# Patient Record
Sex: FEMALE | Race: WHITE | NOT HISPANIC OR LATINO | URBAN - METROPOLITAN AREA
[De-identification: names, ages, dates, MRNs, and addresses within clinical notes are randomized per-mention and may not be internally consistent; named-entity substitution may affect disease eponyms.]

---

## 2018-02-15 ENCOUNTER — OUTPATIENT (OUTPATIENT)
Dept: OUTPATIENT SERVICES | Facility: HOSPITAL | Age: 78
LOS: 1 days | End: 2018-02-15
Payer: MEDICARE

## 2018-02-15 ENCOUNTER — APPOINTMENT (OUTPATIENT)
Dept: MAMMOGRAPHY | Facility: HOSPITAL | Age: 78
End: 2018-02-15
Payer: MEDICARE

## 2018-02-15 DIAGNOSIS — Z12.31 ENCOUNTER FOR SCREENING MAMMOGRAM FOR MALIGNANT NEOPLASM OF BREAST: ICD-10-CM

## 2018-02-15 PROCEDURE — 77067 SCR MAMMO BI INCL CAD: CPT | Mod: 26

## 2018-02-15 PROCEDURE — 77063 BREAST TOMOSYNTHESIS BI: CPT | Mod: 26

## 2018-02-15 PROCEDURE — 77067 SCR MAMMO BI INCL CAD: CPT

## 2018-02-15 PROCEDURE — 77063 BREAST TOMOSYNTHESIS BI: CPT

## 2019-04-05 ENCOUNTER — MEDICATION RENEWAL (OUTPATIENT)
Age: 79
End: 2019-04-05

## 2019-04-09 ENCOUNTER — MEDICATION RENEWAL (OUTPATIENT)
Age: 79
End: 2019-04-09

## 2019-05-13 ENCOUNTER — APPOINTMENT (OUTPATIENT)
Dept: CARDIOLOGY | Facility: CLINIC | Age: 79
End: 2019-05-13
Payer: MEDICARE

## 2019-05-13 ENCOUNTER — NON-APPOINTMENT (OUTPATIENT)
Age: 79
End: 2019-05-13

## 2019-05-13 VITALS
DIASTOLIC BLOOD PRESSURE: 86 MMHG | HEIGHT: 61 IN | WEIGHT: 160 LBS | SYSTOLIC BLOOD PRESSURE: 150 MMHG | OXYGEN SATURATION: 98 % | HEART RATE: 86 BPM | TEMPERATURE: 98.7 F | BODY MASS INDEX: 30.21 KG/M2

## 2019-05-13 DIAGNOSIS — Z80.41 FAMILY HISTORY OF MALIGNANT NEOPLASM OF OVARY: ICD-10-CM

## 2019-05-13 DIAGNOSIS — Z80.1 FAMILY HISTORY OF MALIGNANT NEOPLASM OF TRACHEA, BRONCHUS AND LUNG: ICD-10-CM

## 2019-05-13 DIAGNOSIS — Z78.9 OTHER SPECIFIED HEALTH STATUS: ICD-10-CM

## 2019-05-13 PROCEDURE — 99214 OFFICE O/P EST MOD 30 MIN: CPT

## 2019-05-13 PROCEDURE — 93000 ELECTROCARDIOGRAM COMPLETE: CPT

## 2019-05-13 NOTE — PHYSICAL EXAM
[No Acute Distress] : no acute distress [Well Nourished] : well nourished [Well Developed] : well developed [Well-Appearing] : well-appearing [Normal Sclera/Conjunctiva] : normal sclera/conjunctiva [PERRL] : pupils equal round and reactive to light [EOMI] : extraocular movements intact [Normal Outer Ear/Nose] : the outer ears and nose were normal in appearance [Normal Oropharynx] : the oropharynx was normal [No JVD] : no jugular venous distention [Thyroid Normal, No Nodules] : the thyroid was normal and there were no nodules present [No Lymphadenopathy] : no lymphadenopathy [Supple] : supple [No Respiratory Distress] : no respiratory distress  [Clear to Auscultation] : lungs were clear to auscultation bilaterally [No Accessory Muscle Use] : no accessory muscle use [Normal Rate] : normal rate  [Regular Rhythm] : with a regular rhythm [Normal S1, S2] : normal S1 and S2 [No Abdominal Bruit] : a ~M bruit was not heard ~T in the abdomen [No Murmur] : no murmur heard [No Carotid Bruits] : no carotid bruits [Pedal Pulses Present] : the pedal pulses are present [No Varicosities] : no varicosities [No Edema] : there was no peripheral edema [No Palpable Aorta] : no palpable aorta [No Extremity Clubbing/Cyanosis] : no extremity clubbing/cyanosis [Non Tender] : non-tender [Soft] : abdomen soft [No HSM] : no HSM [Non-distended] : non-distended [No Masses] : no abdominal mass palpated [Normal Posterior Cervical Nodes] : no posterior cervical lymphadenopathy [Normal Bowel Sounds] : normal bowel sounds [Normal Anterior Cervical Nodes] : no anterior cervical lymphadenopathy [No CVA Tenderness] : no CVA  tenderness [No Joint Swelling] : no joint swelling [Grossly Normal Strength/Tone] : grossly normal strength/tone [No Spinal Tenderness] : no spinal tenderness [No Rash] : no rash [Normal Gait] : normal gait [No Focal Deficits] : no focal deficits [Coordination Grossly Intact] : coordination grossly intact [Normal Affect] : the affect was normal [Deep Tendon Reflexes (DTR)] : deep tendon reflexes were 2+ and symmetric [Normal Insight/Judgement] : insight and judgment were intact

## 2019-05-20 LAB
BASOPHILS # BLD AUTO: 0.04 K/UL
BASOPHILS NFR BLD AUTO: 0.6 %
EOSINOPHIL # BLD AUTO: 0.16 K/UL
EOSINOPHIL NFR BLD AUTO: 2.3 %
HCT VFR BLD CALC: 42.2 %
HGB BLD-MCNC: 13.3 G/DL
IMM GRANULOCYTES NFR BLD AUTO: 0.1 %
LYMPHOCYTES # BLD AUTO: 1.67 K/UL
LYMPHOCYTES NFR BLD AUTO: 24.1 %
MAN DIFF?: NORMAL
MCHC RBC-ENTMCNC: 28.9 PG
MCHC RBC-ENTMCNC: 31.5 GM/DL
MCV RBC AUTO: 91.7 FL
MONOCYTES # BLD AUTO: 0.51 K/UL
MONOCYTES NFR BLD AUTO: 7.4 %
NEUTROPHILS # BLD AUTO: 4.54 K/UL
NEUTROPHILS NFR BLD AUTO: 65.5 %
PLATELET # BLD AUTO: 169 K/UL
RBC # BLD: 4.6 M/UL
RBC # FLD: 14.3 %
WBC # FLD AUTO: 6.93 K/UL

## 2019-05-21 LAB
25(OH)D3 SERPL-MCNC: 32.4 NG/ML
ALBUMIN SERPL ELPH-MCNC: 4.6 G/DL
ALP BLD-CCNC: 63 U/L
ALT SERPL-CCNC: 14 U/L
ANION GAP SERPL CALC-SCNC: 13 MMOL/L
AST SERPL-CCNC: 19 U/L
BILIRUB DIRECT SERPL-MCNC: 0.1 MG/DL
BILIRUB INDIRECT SERPL-MCNC: 0.2 MG/DL
BILIRUB SERPL-MCNC: 0.4 MG/DL
BUN SERPL-MCNC: 16 MG/DL
CALCIUM SERPL-MCNC: 9.4 MG/DL
CHLORIDE SERPL-SCNC: 105 MMOL/L
CHOLEST SERPL-MCNC: 150 MG/DL
CHOLEST/HDLC SERPL: 2.9 RATIO
CK SERPL-CCNC: 82 U/L
CO2 SERPL-SCNC: 24 MMOL/L
CREAT SERPL-MCNC: 0.74 MG/DL
ESTIMATED AVERAGE GLUCOSE: 97 MG/DL
GLUCOSE SERPL-MCNC: 97 MG/DL
HBA1C MFR BLD HPLC: 5 %
HDLC SERPL-MCNC: 51 MG/DL
LDLC SERPL CALC-MCNC: 80 MG/DL
POTASSIUM SERPL-SCNC: 4.1 MMOL/L
PROT SERPL-MCNC: 7.3 G/DL
SODIUM SERPL-SCNC: 142 MMOL/L
T4 FREE SERPL-MCNC: 1.7 NG/DL
TRIGL SERPL-MCNC: 96 MG/DL
TSH SERPL-ACNC: 0.81 UIU/ML

## 2019-05-22 ENCOUNTER — APPOINTMENT (OUTPATIENT)
Dept: RADIOLOGY | Facility: HOSPITAL | Age: 79
End: 2019-05-22
Payer: MEDICARE

## 2019-05-22 ENCOUNTER — OUTPATIENT (OUTPATIENT)
Dept: OUTPATIENT SERVICES | Facility: HOSPITAL | Age: 79
LOS: 1 days | End: 2019-05-22
Payer: MEDICARE

## 2019-05-22 ENCOUNTER — APPOINTMENT (OUTPATIENT)
Dept: MAMMOGRAPHY | Facility: HOSPITAL | Age: 79
End: 2019-05-22
Payer: MEDICARE

## 2019-05-22 DIAGNOSIS — Z12.31 ENCOUNTER FOR SCREENING MAMMOGRAM FOR MALIGNANT NEOPLASM OF BREAST: ICD-10-CM

## 2019-05-22 PROCEDURE — 77067 SCR MAMMO BI INCL CAD: CPT

## 2019-05-22 PROCEDURE — 77063 BREAST TOMOSYNTHESIS BI: CPT | Mod: 26

## 2019-05-22 PROCEDURE — 77067 SCR MAMMO BI INCL CAD: CPT | Mod: 26

## 2019-05-22 PROCEDURE — 71100 X-RAY EXAM RIBS UNI 2 VIEWS: CPT

## 2019-05-22 PROCEDURE — 71101 X-RAY EXAM UNILAT RIBS/CHEST: CPT | Mod: 26,RT

## 2019-05-22 PROCEDURE — 77063 BREAST TOMOSYNTHESIS BI: CPT

## 2019-05-22 PROCEDURE — 71101 X-RAY EXAM UNILAT RIBS/CHEST: CPT

## 2019-05-24 ENCOUNTER — APPOINTMENT (OUTPATIENT)
Dept: RADIOLOGY | Facility: HOSPITAL | Age: 79
End: 2019-05-24
Payer: MEDICARE

## 2019-05-24 ENCOUNTER — OUTPATIENT (OUTPATIENT)
Dept: OUTPATIENT SERVICES | Facility: HOSPITAL | Age: 79
LOS: 1 days | End: 2019-05-24
Payer: MEDICARE

## 2019-05-24 DIAGNOSIS — Z00.8 ENCOUNTER FOR OTHER GENERAL EXAMINATION: ICD-10-CM

## 2019-05-24 PROCEDURE — 71046 X-RAY EXAM CHEST 2 VIEWS: CPT | Mod: 26

## 2019-05-24 PROCEDURE — 71046 X-RAY EXAM CHEST 2 VIEWS: CPT

## 2019-05-27 NOTE — REVIEW OF SYSTEMS
[Negative] : Heme/Lymph [Joint Pain] : no joint pain [Joint Stiffness] : no joint stiffness [Muscle Weakness] : no muscle weakness [Muscle Pain] : no muscle pain [Joint Swelling] : no joint swelling [FreeTextEntry9] : Right Rib Pain [Back Pain] : no back pain

## 2019-05-27 NOTE — HISTORY OF PRESENT ILLNESS
[FreeTextEntry8] : This is a 78 year old lady with a PMH of Hypothyroid, HLD, and HTN. She states that in early April she was shoveling the snow and the ice and is concerned that she pulled something. The pain comes and goes and she rates the pain  4/10. Patient denies dyspnea, palpitations, chest pain, nausea, vomiting, dizziness and lightheadedness.\par

## 2019-06-26 ENCOUNTER — APPOINTMENT (OUTPATIENT)
Dept: ULTRASOUND IMAGING | Facility: HOSPITAL | Age: 79
End: 2019-06-26
Payer: MEDICARE

## 2019-06-26 ENCOUNTER — OUTPATIENT (OUTPATIENT)
Dept: OUTPATIENT SERVICES | Facility: HOSPITAL | Age: 79
LOS: 1 days | End: 2019-06-26
Payer: MEDICARE

## 2019-06-26 DIAGNOSIS — Z00.8 ENCOUNTER FOR OTHER GENERAL EXAMINATION: ICD-10-CM

## 2019-06-26 PROCEDURE — 76641 ULTRASOUND BREAST COMPLETE: CPT

## 2019-06-26 PROCEDURE — 76641 ULTRASOUND BREAST COMPLETE: CPT | Mod: 26,50

## 2019-07-09 ENCOUNTER — MEDICATION RENEWAL (OUTPATIENT)
Age: 79
End: 2019-07-09

## 2019-10-08 ENCOUNTER — MEDICATION RENEWAL (OUTPATIENT)
Age: 79
End: 2019-10-08

## 2019-12-12 ENCOUNTER — APPOINTMENT (OUTPATIENT)
Dept: ORTHOPEDIC SURGERY | Facility: CLINIC | Age: 79
End: 2019-12-12
Payer: MEDICARE

## 2019-12-12 VITALS — HEIGHT: 64 IN | WEIGHT: 160 LBS | BODY MASS INDEX: 27.31 KG/M2

## 2019-12-12 DIAGNOSIS — M25.562 PAIN IN LEFT KNEE: ICD-10-CM

## 2019-12-12 PROCEDURE — 73564 X-RAY EXAM KNEE 4 OR MORE: CPT | Mod: LT

## 2019-12-12 PROCEDURE — 99203 OFFICE O/P NEW LOW 30 MIN: CPT

## 2019-12-12 NOTE — END OF VISIT
[FreeTextEntry3] : All medical record entries made by the Scribe were at my, Dr. Karlo Locke, direction and personally dictated by me on 12/12/2019. I have reviewed the chart and agree that the record accurately reflects my personal performance of the history, physical exam, assessment and plan. I have also personally directed, reviewed, and agreed with the chart.

## 2019-12-12 NOTE — PHYSICAL EXAM
[Normal Touch] : sensation intact for touch [Normal] : No swelling, no edema, normal pedal pulses and normal temperature [Normal LLE] : Left Lower Extremity: No scars, rashes, lesions, ulcers, skin intact [Poor Appearance] : well-appearing [Acute Distress] : not in acute distress [Obese] : not obese [de-identified] : Left Lower Extremity\par o Knee :\par ¦ Inspection/Palpation : no tenderness to palpation, minimal swelling with no effusion, no deformity\par ¦ Range of Motion : 0 - 120 degrees, no crepitus\par ¦ Stability : no valgus or varus instability present on provocative testing, Lachman’s Test (-)\par ¦ Strength : flexion and extension 4/5\par o Muscle Bulk : normal muscle bulk present\par o Skin : no erythema, no ecchymosis\par o Sensation : sensation to pin intact\par o Vascular Exam : no edema, no cyanosis, dorsalis pedis artery pulse 2+, posterior tibial artery pulse 2+  [de-identified] : o Left Knee : AP, lateral, sunrise, and Mcfarlane views of the knee were obtained, there are no soft tissue abnormalities, no fractures, alignment is normal, moderate tricompartmental osteoarthritis, normal bone density, no bony lesions.

## 2019-12-12 NOTE — HISTORY OF PRESENT ILLNESS
[de-identified] : 79 year old female presents for an evaluation of left knee pain that  began on 11/11/2019 and she cannot attribute her pain to any specific injury, though she notes that she had been cleaning her house and climbing up and down stairs earlier that day. She had noted swelling of the knee following initial onset of pain, and reports that her pain has improved. Currently she rates her pain a 5/10 and describes a cramping pain along the posterior aspect of her right knee that is intermittent in nature, and that radiated down her right lower extremity. Her symptoms are exacerbated with climbing stairs and are alleviated with rest. The patient has no other complaints at this time.

## 2019-12-12 NOTE — DISCUSSION/SUMMARY
[de-identified] : The underlying pathophysiology was reviewed in great detail with the patient as well as the various treatment options, including ice, analgesics, NSAIDs, Physical therapy, steroid injections. \par \par A home exercise sheet was given and discussed with the patient to follow. \par \par Activity modifications and restrictions were discussed. She is to avoid deep bending and squatting. \par \par FU PRN.

## 2019-12-12 NOTE — CONSULT LETTER
[Dear  ___] : Dear  [unfilled], [Consult Letter:] : I had the pleasure of evaluating your patient, [unfilled]. [Please see my note below.] : Please see my note below. [Consult Closing:] : Thank you very much for allowing me to participate in the care of this patient.  If you have any questions, please do not hesitate to contact me. [Sincerely,] : Sincerely, [FreeTextEntry3] : Dr. Karlo Locke

## 2019-12-12 NOTE — ADDENDUM
[FreeTextEntry1] : I, Shannan Wells, acted solely as a scribe for Dr. Karlo Locke on this date 12/12/2019.

## 2019-12-23 ENCOUNTER — MEDICATION RENEWAL (OUTPATIENT)
Age: 79
End: 2019-12-23

## 2019-12-23 RX ORDER — CANDESARTAN CILEXETIL AND HYDROCHLOROTHIAZIDE 32; 12.5 MG/1; MG/1
32-12.5 TABLET ORAL DAILY
Qty: 30 | Refills: 2 | Status: DISCONTINUED | COMMUNITY
Start: 2019-05-13 | End: 2019-12-23

## 2020-03-11 ENCOUNTER — NON-APPOINTMENT (OUTPATIENT)
Age: 80
End: 2020-03-11

## 2020-03-11 ENCOUNTER — APPOINTMENT (OUTPATIENT)
Dept: CARDIOLOGY | Facility: CLINIC | Age: 80
End: 2020-03-11
Payer: MEDICARE

## 2020-03-11 VITALS
HEIGHT: 64 IN | WEIGHT: 153 LBS | DIASTOLIC BLOOD PRESSURE: 80 MMHG | OXYGEN SATURATION: 97 % | TEMPERATURE: 98.3 F | SYSTOLIC BLOOD PRESSURE: 120 MMHG | BODY MASS INDEX: 26.12 KG/M2 | HEART RATE: 78 BPM

## 2020-03-11 DIAGNOSIS — R53.83 OTHER FATIGUE: ICD-10-CM

## 2020-03-11 PROCEDURE — 93000 ELECTROCARDIOGRAM COMPLETE: CPT

## 2020-03-11 PROCEDURE — 99213 OFFICE O/P EST LOW 20 MIN: CPT

## 2020-03-11 RX ORDER — DICLOFENAC SODIUM 100 MG/1
100 TABLET, FILM COATED, EXTENDED RELEASE ORAL
Qty: 14 | Refills: 0 | Status: DISCONTINUED | COMMUNITY
Start: 2019-05-13 | End: 2020-03-11

## 2020-03-11 NOTE — HISTORY OF PRESENT ILLNESS
[FreeTextEntry1] : Kaye is a 80yo female who presents for routine follow-up. She has PMH of HTN, hypothyroidism, and HLD.Patient reports she is feeling well overall. Has no issues or concerns. Patient denies chest pain, shortness of breath, palpitations, dizziness, vision changes, n/v, abdominal pain, changes in bowel/bladder habits,  or appetite. pt with ocassional fatigue

## 2020-03-11 NOTE — PHYSICAL EXAM
[General Appearance - Well Developed] : well developed [Normal Appearance] : normal appearance [Well Groomed] : well groomed [General Appearance - Well Nourished] : well nourished [No Deformities] : no deformities [General Appearance - In No Acute Distress] : no acute distress [Normal Conjunctiva] : the conjunctiva exhibited no abnormalities [Eyelids - No Xanthelasma] : the eyelids demonstrated no xanthelasmas [Normal Oral Mucosa] : normal oral mucosa [No Oral Pallor] : no oral pallor [No Oral Cyanosis] : no oral cyanosis [Normal Jugular Venous A Waves Present] : normal jugular venous A waves present [Normal Jugular Venous V Waves Present] : normal jugular venous V waves present [No Jugular Venous Dunn A Waves] : no jugular venous dunn A waves [Respiration, Rhythm And Depth] : normal respiratory rhythm and effort [Exaggerated Use Of Accessory Muscles For Inspiration] : no accessory muscle use [Auscultation Breath Sounds / Voice Sounds] : lungs were clear to auscultation bilaterally [Heart Rate And Rhythm] : heart rate and rhythm were normal [Heart Sounds] : normal S1 and S2 [Murmurs] : no murmurs present [Abdomen Soft] : soft [Abdomen Tenderness] : non-tender [Abdomen Mass (___ Cm)] : no abdominal mass palpated [Abnormal Walk] : normal gait [Gait - Sufficient For Exercise Testing] : the gait was sufficient for exercise testing [Nail Clubbing] : no clubbing of the fingernails [Cyanosis, Localized] : no localized cyanosis [Petechial Hemorrhages (___cm)] : no petechial hemorrhages [Skin Color & Pigmentation] : normal skin color and pigmentation [] : no rash [No Venous Stasis] : no venous stasis [Skin Lesions] : no skin lesions [No Skin Ulcers] : no skin ulcer [No Xanthoma] : no  xanthoma was observed [Oriented To Time, Place, And Person] : oriented to person, place, and time [Affect] : the affect was normal [Mood] : the mood was normal [No Anxiety] : not feeling anxious

## 2020-05-06 ENCOUNTER — LABORATORY RESULT (OUTPATIENT)
Age: 80
End: 2020-05-06

## 2020-05-06 ENCOUNTER — RECORD ABSTRACTING (OUTPATIENT)
Age: 80
End: 2020-05-06

## 2020-09-21 ENCOUNTER — TRANSCRIPTION ENCOUNTER (OUTPATIENT)
Age: 80
End: 2020-09-21

## 2020-11-24 ENCOUNTER — APPOINTMENT (OUTPATIENT)
Dept: CARDIOLOGY | Facility: CLINIC | Age: 80
End: 2020-11-24
Payer: MEDICARE

## 2020-11-24 ENCOUNTER — NON-APPOINTMENT (OUTPATIENT)
Age: 80
End: 2020-11-24

## 2020-11-24 ENCOUNTER — APPOINTMENT (OUTPATIENT)
Dept: PULMONOLOGY | Facility: CLINIC | Age: 80
End: 2020-11-24
Payer: MEDICARE

## 2020-11-24 VITALS
HEIGHT: 64 IN | WEIGHT: 152.5 LBS | OXYGEN SATURATION: 97 % | HEART RATE: 73 BPM | BODY MASS INDEX: 26.03 KG/M2 | RESPIRATION RATE: 17 BRPM | SYSTOLIC BLOOD PRESSURE: 162 MMHG | DIASTOLIC BLOOD PRESSURE: 82 MMHG | TEMPERATURE: 97.2 F

## 2020-11-24 DIAGNOSIS — U07.1 COVID-19: ICD-10-CM

## 2020-11-24 DIAGNOSIS — R07.81 PLEURODYNIA: ICD-10-CM

## 2020-11-24 PROCEDURE — 93000 ELECTROCARDIOGRAM COMPLETE: CPT

## 2020-11-24 PROCEDURE — 71046 X-RAY EXAM CHEST 2 VIEWS: CPT

## 2020-11-24 PROCEDURE — 99214 OFFICE O/P EST MOD 30 MIN: CPT

## 2020-11-24 PROCEDURE — 71100 X-RAY EXAM RIBS UNI 2 VIEWS: CPT

## 2020-11-24 NOTE — REASON FOR VISIT
[Follow-Up - Clinic] : a clinic follow-up of [FreeTextEntry1] : F/U Medical issues and an acute issue of right flank pain

## 2020-11-24 NOTE — HISTORY OF PRESENT ILLNESS
[FreeTextEntry1] : This is a 80 year old lady with a PMH of HTN, Hyperlipidemia and Hypothyroidism presents today for follow up. Patient states that she was diagnosed with COVID-19 in March 2020. Patient states that since that time she has noticed that her BP has been elevated. Patient states that she has been taking her BP medication as indicated. Patient states that for the past few months she has been experiencing some right sided rib pain that is intermittent. Patient states that she is not experiencing the pain today. Patient denies dyspnea, palpitations, chest pain, nausea, vomiting, dizziness and lightheadedness.\par

## 2020-12-01 ENCOUNTER — NON-APPOINTMENT (OUTPATIENT)
Age: 80
End: 2020-12-01

## 2020-12-08 LAB
25(OH)D3 SERPL-MCNC: 42.4 NG/ML
ALBUMIN SERPL ELPH-MCNC: 4.4 G/DL
ALP BLD-CCNC: 78 U/L
ALT SERPL-CCNC: 13 U/L
ANION GAP SERPL CALC-SCNC: 10 MMOL/L
APPEARANCE: ABNORMAL
APPEARANCE: CLEAR
AST SERPL-CCNC: 18 U/L
BACTERIA UR CULT: ABNORMAL
BACTERIA: ABNORMAL
BACTERIA: NEGATIVE
BASOPHILS # BLD AUTO: 0.04 K/UL
BASOPHILS NFR BLD AUTO: 0.6 %
BILIRUB DIRECT SERPL-MCNC: 0.1 MG/DL
BILIRUB INDIRECT SERPL-MCNC: 0.2 MG/DL
BILIRUB SERPL-MCNC: 0.3 MG/DL
BILIRUBIN URINE: NEGATIVE
BILIRUBIN URINE: NEGATIVE
BLOOD URINE: ABNORMAL
BLOOD URINE: NORMAL
BUN SERPL-MCNC: 21 MG/DL
CALCIUM SERPL-MCNC: 9.4 MG/DL
CHLORIDE SERPL-SCNC: 107 MMOL/L
CHOLEST SERPL-MCNC: 145 MG/DL
CK SERPL-CCNC: 58 U/L
CO2 SERPL-SCNC: 24 MMOL/L
COLOR: COLORLESS
COLOR: YELLOW
CREAT SERPL-MCNC: 0.61 MG/DL
EOSINOPHIL # BLD AUTO: 0.2 K/UL
EOSINOPHIL NFR BLD AUTO: 3.1 %
ESTIMATED AVERAGE GLUCOSE: 105 MG/DL
GLUCOSE QUALITATIVE U: NEGATIVE
GLUCOSE QUALITATIVE U: NEGATIVE
GLUCOSE SERPL-MCNC: 80 MG/DL
HBA1C MFR BLD HPLC: 5.3 %
HCT VFR BLD CALC: 42.2 %
HDLC SERPL-MCNC: 57 MG/DL
HGB BLD-MCNC: 13.5 G/DL
HYALINE CASTS: 1 /LPF
HYALINE CASTS: 4 /LPF
IMM GRANULOCYTES NFR BLD AUTO: 0.3 %
KETONES URINE: NEGATIVE
KETONES URINE: NEGATIVE
LDLC SERPL CALC-MCNC: 68 MG/DL
LEUKOCYTE ESTERASE URINE: ABNORMAL
LEUKOCYTE ESTERASE URINE: NEGATIVE
LYMPHOCYTES # BLD AUTO: 2.09 K/UL
LYMPHOCYTES NFR BLD AUTO: 32 %
MAN DIFF?: NORMAL
MCHC RBC-ENTMCNC: 29.7 PG
MCHC RBC-ENTMCNC: 32 GM/DL
MCV RBC AUTO: 92.7 FL
MICROSCOPIC-UA: NORMAL
MICROSCOPIC-UA: NORMAL
MONOCYTES # BLD AUTO: 0.62 K/UL
MONOCYTES NFR BLD AUTO: 9.5 %
NEUTROPHILS # BLD AUTO: 3.56 K/UL
NEUTROPHILS NFR BLD AUTO: 54.5 %
NITRITE URINE: NEGATIVE
NITRITE URINE: POSITIVE
NONHDLC SERPL-MCNC: 88 MG/DL
PH URINE: 6
PH URINE: 6
PLATELET # BLD AUTO: 153 K/UL
POTASSIUM SERPL-SCNC: 4.1 MMOL/L
PROT SERPL-MCNC: 7.4 G/DL
PROTEIN URINE: NEGATIVE
PROTEIN URINE: NORMAL
RBC # BLD: 4.55 M/UL
RBC # FLD: 13.9 %
RED BLOOD CELLS URINE: 2 /HPF
RED BLOOD CELLS URINE: 6 /HPF
SARS-COV-2 IGG SERPL IA-ACNC: 63.4 INDEX
SARS-COV-2 IGG SERPL QL IA: POSITIVE
SODIUM SERPL-SCNC: 141 MMOL/L
SPECIFIC GRAVITY URINE: 1.01
SPECIFIC GRAVITY URINE: 1.02
SQUAMOUS EPITHELIAL CELLS: 1 /HPF
SQUAMOUS EPITHELIAL CELLS: 1 /HPF
T4 FREE SERPL-MCNC: 1.5 NG/DL
TRIGL SERPL-MCNC: 97 MG/DL
TSH SERPL-ACNC: 0.91 UIU/ML
UROBILINOGEN URINE: NORMAL
UROBILINOGEN URINE: NORMAL
WBC # FLD AUTO: 6.53 K/UL
WHITE BLOOD CELLS URINE: 1 /HPF
WHITE BLOOD CELLS URINE: 90 /HPF

## 2020-12-11 ENCOUNTER — NON-APPOINTMENT (OUTPATIENT)
Age: 80
End: 2020-12-11

## 2020-12-13 ENCOUNTER — RX RENEWAL (OUTPATIENT)
Age: 80
End: 2020-12-13

## 2020-12-15 ENCOUNTER — RESULT REVIEW (OUTPATIENT)
Age: 80
End: 2020-12-15

## 2020-12-15 ENCOUNTER — APPOINTMENT (OUTPATIENT)
Dept: MAMMOGRAPHY | Facility: HOSPITAL | Age: 80
End: 2020-12-15
Payer: MEDICARE

## 2020-12-15 ENCOUNTER — OUTPATIENT (OUTPATIENT)
Dept: OUTPATIENT SERVICES | Facility: HOSPITAL | Age: 80
LOS: 1 days | End: 2020-12-15
Payer: MEDICARE

## 2020-12-15 ENCOUNTER — APPOINTMENT (OUTPATIENT)
Dept: RADIOLOGY | Facility: HOSPITAL | Age: 80
End: 2020-12-15
Payer: MEDICARE

## 2020-12-15 DIAGNOSIS — Z00.8 ENCOUNTER FOR OTHER GENERAL EXAMINATION: ICD-10-CM

## 2020-12-15 PROCEDURE — 77080 DXA BONE DENSITY AXIAL: CPT

## 2020-12-15 PROCEDURE — 77063 BREAST TOMOSYNTHESIS BI: CPT

## 2020-12-15 PROCEDURE — 77063 BREAST TOMOSYNTHESIS BI: CPT | Mod: 26

## 2020-12-15 PROCEDURE — 77080 DXA BONE DENSITY AXIAL: CPT | Mod: 26

## 2020-12-15 PROCEDURE — 77067 SCR MAMMO BI INCL CAD: CPT

## 2020-12-15 PROCEDURE — 77067 SCR MAMMO BI INCL CAD: CPT | Mod: 26

## 2020-12-17 ENCOUNTER — NON-APPOINTMENT (OUTPATIENT)
Age: 80
End: 2020-12-17

## 2020-12-31 ENCOUNTER — APPOINTMENT (OUTPATIENT)
Dept: CARDIOLOGY | Facility: CLINIC | Age: 80
End: 2020-12-31
Payer: MEDICARE

## 2020-12-31 VITALS
TEMPERATURE: 97.5 F | HEART RATE: 70 BPM | HEIGHT: 64 IN | SYSTOLIC BLOOD PRESSURE: 144 MMHG | BODY MASS INDEX: 26.03 KG/M2 | OXYGEN SATURATION: 99 % | DIASTOLIC BLOOD PRESSURE: 84 MMHG | WEIGHT: 152.5 LBS

## 2020-12-31 DIAGNOSIS — Z86.79 PERSONAL HISTORY OF OTHER DISEASES OF THE CIRCULATORY SYSTEM: ICD-10-CM

## 2020-12-31 DIAGNOSIS — N39.0 URINARY TRACT INFECTION, SITE NOT SPECIFIED: ICD-10-CM

## 2020-12-31 PROCEDURE — 99072 ADDL SUPL MATRL&STAF TM PHE: CPT

## 2020-12-31 PROCEDURE — 99213 OFFICE O/P EST LOW 20 MIN: CPT

## 2021-01-03 ENCOUNTER — RX RENEWAL (OUTPATIENT)
Age: 81
End: 2021-01-03

## 2021-01-03 LAB
ANION GAP SERPL CALC-SCNC: 13 MMOL/L
APPEARANCE: CLEAR
BACTERIA UR CULT: NORMAL
BACTERIA: NEGATIVE
BILIRUBIN URINE: NEGATIVE
BLOOD URINE: ABNORMAL
BUN SERPL-MCNC: 18 MG/DL
CALCIUM SERPL-MCNC: 9.3 MG/DL
CHLORIDE SERPL-SCNC: 107 MMOL/L
CO2 SERPL-SCNC: 22 MMOL/L
COLOR: NORMAL
CREAT SERPL-MCNC: 0.74 MG/DL
GLUCOSE QUALITATIVE U: NEGATIVE
GLUCOSE SERPL-MCNC: 95 MG/DL
HYALINE CASTS: 1 /LPF
KETONES URINE: NEGATIVE
LEUKOCYTE ESTERASE URINE: NEGATIVE
MICROSCOPIC-UA: NORMAL
NITRITE URINE: NEGATIVE
PH URINE: 6
POTASSIUM SERPL-SCNC: 4.6 MMOL/L
PROTEIN URINE: NEGATIVE
RED BLOOD CELLS URINE: 0 /HPF
SODIUM SERPL-SCNC: 142 MMOL/L
SPECIFIC GRAVITY URINE: 1.01
SQUAMOUS EPITHELIAL CELLS: 1 /HPF
UROBILINOGEN URINE: NORMAL
WHITE BLOOD CELLS URINE: 1 /HPF

## 2021-01-03 NOTE — HISTORY OF PRESENT ILLNESS
[FreeTextEntry1] : pt presents for f/u .pt tolerating hctz however has been off over last few days  pt denies any chest  pain dizziness ,lightheadedness ,nausea vomiting diaphoresis\par pt also s/p antibiotics for uti pt denies any urinary symptoms

## 2021-01-03 NOTE — PHYSICAL EXAM
[General Appearance - Well Developed] : well developed [Normal Appearance] : normal appearance [Well Groomed] : well groomed [General Appearance - Well Nourished] : well nourished [No Deformities] : no deformities [General Appearance - In No Acute Distress] : no acute distress [Normal Conjunctiva] : the conjunctiva exhibited no abnormalities [Eyelids - No Xanthelasma] : the eyelids demonstrated no xanthelasmas [Normal Oral Mucosa] : normal oral mucosa [No Oral Pallor] : no oral pallor [No Oral Cyanosis] : no oral cyanosis [Normal Jugular Venous A Waves Present] : normal jugular venous A waves present [Normal Jugular Venous V Waves Present] : normal jugular venous V waves present [No Jugular Venous Dunn A Waves] : no jugular venous dunn A waves [Respiration, Rhythm And Depth] : normal respiratory rhythm and effort [Exaggerated Use Of Accessory Muscles For Inspiration] : no accessory muscle use [Auscultation Breath Sounds / Voice Sounds] : lungs were clear to auscultation bilaterally [Heart Rate And Rhythm] : heart rate and rhythm were normal [Heart Sounds] : normal S1 and S2 [Murmurs] : no murmurs present [Abdomen Soft] : soft [Abdomen Tenderness] : non-tender [Abdomen Mass (___ Cm)] : no abdominal mass palpated [Abnormal Walk] : normal gait [Gait - Sufficient For Exercise Testing] : the gait was sufficient for exercise testing [Nail Clubbing] : no clubbing of the fingernails [Cyanosis, Localized] : no localized cyanosis [Petechial Hemorrhages (___cm)] : no petechial hemorrhages [] : no ischemic changes [Oriented To Time, Place, And Person] : oriented to person, place, and time [Affect] : the affect was normal [Mood] : the mood was normal [No Anxiety] : not feeling anxious [FreeTextEntry1] : seborrheic keratosis face

## 2021-01-05 ENCOUNTER — NON-APPOINTMENT (OUTPATIENT)
Age: 81
End: 2021-01-05

## 2021-01-29 ENCOUNTER — APPOINTMENT (OUTPATIENT)
Dept: DERMATOLOGY | Facility: CLINIC | Age: 81
End: 2021-01-29

## 2021-03-21 ENCOUNTER — RX RENEWAL (OUTPATIENT)
Age: 81
End: 2021-03-21

## 2021-04-23 ENCOUNTER — APPOINTMENT (OUTPATIENT)
Dept: CARDIOLOGY | Facility: CLINIC | Age: 81
End: 2021-04-23

## 2021-06-26 ENCOUNTER — RX RENEWAL (OUTPATIENT)
Age: 81
End: 2021-06-26

## 2021-08-22 ENCOUNTER — RX RENEWAL (OUTPATIENT)
Age: 81
End: 2021-08-22

## 2021-10-14 ENCOUNTER — NON-APPOINTMENT (OUTPATIENT)
Age: 81
End: 2021-10-14

## 2021-10-14 ENCOUNTER — APPOINTMENT (OUTPATIENT)
Dept: CARDIOLOGY | Facility: CLINIC | Age: 81
End: 2021-10-14
Payer: MEDICARE

## 2021-10-14 VITALS
DIASTOLIC BLOOD PRESSURE: 70 MMHG | OXYGEN SATURATION: 98 % | HEIGHT: 64 IN | TEMPERATURE: 98.7 F | HEART RATE: 71 BPM | WEIGHT: 149 LBS | SYSTOLIC BLOOD PRESSURE: 120 MMHG | BODY MASS INDEX: 25.44 KG/M2

## 2021-10-14 DIAGNOSIS — R31.29 OTHER MICROSCOPIC HEMATURIA: ICD-10-CM

## 2021-10-14 DIAGNOSIS — L82.1 OTHER SEBORRHEIC KERATOSIS: ICD-10-CM

## 2021-10-14 PROCEDURE — 93000 ELECTROCARDIOGRAM COMPLETE: CPT

## 2021-10-14 PROCEDURE — 99397 PER PM REEVAL EST PAT 65+ YR: CPT

## 2021-10-14 NOTE — PHYSICAL EXAM
[No Acute Distress] : no acute distress [Well Nourished] : well nourished [Well Developed] : well developed [Well-Appearing] : well-appearing [Normal Sclera/Conjunctiva] : normal sclera/conjunctiva [PERRL] : pupils equal round and reactive to light [EOMI] : extraocular movements intact [Normal Outer Ear/Nose] : the outer ears and nose were normal in appearance [Normal Oropharynx] : the oropharynx was normal [No JVD] : no jugular venous distention [No Lymphadenopathy] : no lymphadenopathy [Supple] : supple [Thyroid Normal, No Nodules] : the thyroid was normal and there were no nodules present [No Respiratory Distress] : no respiratory distress  [No Accessory Muscle Use] : no accessory muscle use [Clear to Auscultation] : lungs were clear to auscultation bilaterally [Normal Rate] : normal rate  [Regular Rhythm] : with a regular rhythm [Normal S1, S2] : normal S1 and S2 [No Murmur] : no murmur heard [No Carotid Bruits] : no carotid bruits [No Abdominal Bruit] : a ~M bruit was not heard ~T in the abdomen [No Varicosities] : no varicosities [Pedal Pulses Present] : the pedal pulses are present [No Edema] : there was no peripheral edema [No Palpable Aorta] : no palpable aorta [No Extremity Clubbing/Cyanosis] : no extremity clubbing/cyanosis [Soft] : abdomen soft [Non Tender] : non-tender [Non-distended] : non-distended [No Masses] : no abdominal mass palpated [No HSM] : no HSM [Normal Bowel Sounds] : normal bowel sounds [Normal Posterior Cervical Nodes] : no posterior cervical lymphadenopathy [Normal Anterior Cervical Nodes] : no anterior cervical lymphadenopathy [No CVA Tenderness] : no CVA  tenderness [No Spinal Tenderness] : no spinal tenderness [No Joint Swelling] : no joint swelling [Grossly Normal Strength/Tone] : grossly normal strength/tone [Coordination Grossly Intact] : coordination grossly intact [No Focal Deficits] : no focal deficits [Normal Gait] : normal gait [Deep Tendon Reflexes (DTR)] : deep tendon reflexes were 2+ and symmetric [Normal Affect] : the affect was normal [Normal Insight/Judgement] : insight and judgment were intact [de-identified] : Seborrhea keratosis

## 2021-10-14 NOTE — HISTORY OF PRESENT ILLNESS
[FreeTextEntry1] : Annual Wellness exam  [de-identified] : This is a 80 year old lady with a PMH of HTN, Hypothyroidism, HLD, and Vitamin D Deficiency presents today for annual wellness exam. Patient is accompanied by her daughter and . Patient states that she is feeling good today. She does note that she has a seborrhea keratosis to the right side of her face that she states is bothersome to her. She explains that she was seen by Dermatology ad was told that this is not cancer, however, she is requesting second opinion dermatology. Patient denies dyspnea, palpitations, chest pain, nausea, vomiting, dizziness and lightheadedness.\par

## 2021-12-04 ENCOUNTER — RX RENEWAL (OUTPATIENT)
Age: 81
End: 2021-12-04

## 2022-01-17 ENCOUNTER — RX RENEWAL (OUTPATIENT)
Age: 82
End: 2022-01-17

## 2022-04-20 ENCOUNTER — RX RENEWAL (OUTPATIENT)
Age: 82
End: 2022-04-20

## 2022-06-06 ENCOUNTER — RX RENEWAL (OUTPATIENT)
Age: 82
End: 2022-06-06

## 2022-06-22 ENCOUNTER — RX RENEWAL (OUTPATIENT)
Age: 82
End: 2022-06-22

## 2022-06-28 ENCOUNTER — NON-APPOINTMENT (OUTPATIENT)
Age: 82
End: 2022-06-28

## 2022-07-14 ENCOUNTER — NON-APPOINTMENT (OUTPATIENT)
Age: 82
End: 2022-07-14

## 2022-07-18 ENCOUNTER — NON-APPOINTMENT (OUTPATIENT)
Age: 82
End: 2022-07-18

## 2022-07-19 ENCOUNTER — RX RENEWAL (OUTPATIENT)
Age: 82
End: 2022-07-19

## 2022-08-02 ENCOUNTER — NON-APPOINTMENT (OUTPATIENT)
Age: 82
End: 2022-08-02

## 2022-09-07 ENCOUNTER — RX RENEWAL (OUTPATIENT)
Age: 82
End: 2022-09-07

## 2022-09-19 ENCOUNTER — RX RENEWAL (OUTPATIENT)
Age: 82
End: 2022-09-19

## 2022-10-19 ENCOUNTER — RX RENEWAL (OUTPATIENT)
Age: 82
End: 2022-10-19

## 2022-11-25 ENCOUNTER — RESULT REVIEW (OUTPATIENT)
Age: 82
End: 2022-11-25

## 2022-11-25 ENCOUNTER — APPOINTMENT (OUTPATIENT)
Dept: CARDIOLOGY | Facility: CLINIC | Age: 82
End: 2022-11-25

## 2022-11-25 ENCOUNTER — NON-APPOINTMENT (OUTPATIENT)
Age: 82
End: 2022-11-25

## 2022-11-25 VITALS
BODY MASS INDEX: 25.44 KG/M2 | OXYGEN SATURATION: 97 % | SYSTOLIC BLOOD PRESSURE: 160 MMHG | WEIGHT: 149 LBS | HEART RATE: 77 BPM | TEMPERATURE: 98.5 F | HEIGHT: 64 IN | DIASTOLIC BLOOD PRESSURE: 70 MMHG

## 2022-11-25 DIAGNOSIS — Z13.820 ENCOUNTER FOR SCREENING FOR OSTEOPOROSIS: ICD-10-CM

## 2022-11-25 DIAGNOSIS — Z13.228 ENCOUNTER FOR SCREENING FOR OTHER METABOLIC DISORDERS: ICD-10-CM

## 2022-11-25 DIAGNOSIS — Z00.00 ENCOUNTER FOR GENERAL ADULT MEDICAL EXAMINATION W/OUT ABNORMAL FINDINGS: ICD-10-CM

## 2022-11-25 PROCEDURE — 99397 PER PM REEVAL EST PAT 65+ YR: CPT

## 2022-11-25 PROCEDURE — 93000 ELECTROCARDIOGRAM COMPLETE: CPT

## 2022-11-25 NOTE — HISTORY OF PRESENT ILLNESS
[FreeTextEntry1] : Here for annual wellness exam [de-identified] : This is an 81 y/o female with a pmhx of HTN, Hypothyroidism, HLD, and Vitamin D Deficiency presents today for annual wellness exam. Pateint feels well today and has no acute concerns or complaints. Patient denies chest pain, dyspnea, palpitations, dizziness, syncope, changes in bowel/bladder habits or appetite. \par

## 2022-12-22 DIAGNOSIS — R89.9 UNSPECIFIED ABNORMAL FINDING IN SPECIMENS FROM OTHER ORGANS, SYSTEMS AND TISSUES: ICD-10-CM

## 2022-12-26 ENCOUNTER — APPOINTMENT (OUTPATIENT)
Dept: RADIOLOGY | Facility: HOSPITAL | Age: 82
End: 2022-12-26

## 2022-12-26 ENCOUNTER — OUTPATIENT (OUTPATIENT)
Dept: OUTPATIENT SERVICES | Facility: HOSPITAL | Age: 82
LOS: 1 days | End: 2022-12-26
Payer: MEDICARE

## 2022-12-26 DIAGNOSIS — Z12.39 ENCOUNTER FOR OTHER SCREENING FOR MALIGNANT NEOPLASM OF BREAST: ICD-10-CM

## 2022-12-26 PROCEDURE — 77080 DXA BONE DENSITY AXIAL: CPT | Mod: 26

## 2022-12-26 PROCEDURE — 77080 DXA BONE DENSITY AXIAL: CPT

## 2023-04-21 ENCOUNTER — RX RENEWAL (OUTPATIENT)
Age: 83
End: 2023-04-21

## 2023-06-08 ENCOUNTER — RX RENEWAL (OUTPATIENT)
Age: 83
End: 2023-06-08

## 2023-07-12 ENCOUNTER — RX RENEWAL (OUTPATIENT)
Age: 83
End: 2023-07-12

## 2023-10-03 ENCOUNTER — APPOINTMENT (OUTPATIENT)
Dept: CARDIOLOGY | Facility: CLINIC | Age: 83
End: 2023-10-03
Payer: MEDICARE

## 2023-10-03 ENCOUNTER — APPOINTMENT (OUTPATIENT)
Dept: PULMONOLOGY | Facility: CLINIC | Age: 83
End: 2023-10-03
Payer: MEDICARE

## 2023-10-03 VITALS
HEART RATE: 74 BPM | BODY MASS INDEX: 24.92 KG/M2 | HEIGHT: 64 IN | OXYGEN SATURATION: 98 % | SYSTOLIC BLOOD PRESSURE: 122 MMHG | DIASTOLIC BLOOD PRESSURE: 60 MMHG | WEIGHT: 146 LBS

## 2023-10-03 DIAGNOSIS — E55.9 VITAMIN D DEFICIENCY, UNSPECIFIED: ICD-10-CM

## 2023-10-03 DIAGNOSIS — D22.9 MELANOCYTIC NEVI, UNSPECIFIED: ICD-10-CM

## 2023-10-03 DIAGNOSIS — Z71.85 ENCOUNTER FOR IMMUNIZATION SAFETY COUNSELING: ICD-10-CM

## 2023-10-03 DIAGNOSIS — Z12.11 ENCOUNTER FOR SCREENING FOR MALIGNANT NEOPLASM OF COLON: ICD-10-CM

## 2023-10-03 DIAGNOSIS — R82.90 UNSPECIFIED ABNORMAL FINDINGS IN URINE: ICD-10-CM

## 2023-10-03 DIAGNOSIS — Z12.39 ENCOUNTER FOR OTHER SCREENING FOR MALIGNANT NEOPLASM OF BREAST: ICD-10-CM

## 2023-10-03 DIAGNOSIS — M85.80 OTHER SPECIFIED DISORDERS OF BONE DENSITY AND STRUCTURE, UNSPECIFIED SITE: ICD-10-CM

## 2023-10-03 PROCEDURE — 93000 ELECTROCARDIOGRAM COMPLETE: CPT

## 2023-10-03 PROCEDURE — 99214 OFFICE O/P EST MOD 30 MIN: CPT | Mod: 25

## 2023-10-03 PROCEDURE — 71046 X-RAY EXAM CHEST 2 VIEWS: CPT

## 2023-10-03 RX ORDER — NITROFURANTOIN (MONOHYDRATE/MACROCRYSTALS) 25; 75 MG/1; MG/1
100 CAPSULE ORAL
Qty: 14 | Refills: 0 | Status: DISCONTINUED | COMMUNITY
Start: 2020-11-30 | End: 2023-10-03

## 2023-10-03 RX ORDER — CHOLECALCIFEROL (VITAMIN D3) 50 MCG
50 MCG TABLET ORAL
Qty: 90 | Refills: 0 | Status: ACTIVE | COMMUNITY
Start: 2023-01-04 | End: 1900-01-01

## 2023-10-13 RX ORDER — NITROFURANTOIN (MONOHYDRATE/MACROCRYSTALS) 25; 75 MG/1; MG/1
100 CAPSULE ORAL
Qty: 14 | Refills: 0 | Status: ACTIVE | COMMUNITY
Start: 2023-10-13 | End: 1900-01-01

## 2024-01-01 ENCOUNTER — RX RENEWAL (OUTPATIENT)
Age: 84
End: 2024-01-01

## 2024-02-15 ENCOUNTER — NON-APPOINTMENT (OUTPATIENT)
Age: 84
End: 2024-02-15

## 2024-02-16 ENCOUNTER — NON-APPOINTMENT (OUTPATIENT)
Age: 84
End: 2024-02-16

## 2024-02-16 ENCOUNTER — APPOINTMENT (OUTPATIENT)
Dept: INTERNAL MEDICINE | Facility: CLINIC | Age: 84
End: 2024-02-16
Payer: MEDICARE

## 2024-02-16 ENCOUNTER — APPOINTMENT (OUTPATIENT)
Dept: CARDIOLOGY | Facility: CLINIC | Age: 84
End: 2024-02-16
Payer: MEDICARE

## 2024-02-16 ENCOUNTER — OUTPATIENT (OUTPATIENT)
Dept: OUTPATIENT SERVICES | Facility: HOSPITAL | Age: 84
LOS: 1 days | End: 2024-02-16
Payer: MEDICARE

## 2024-02-16 ENCOUNTER — APPOINTMENT (OUTPATIENT)
Dept: RADIOLOGY | Facility: HOSPITAL | Age: 84
End: 2024-02-16
Payer: MEDICARE

## 2024-02-16 VITALS
HEART RATE: 73 BPM | HEIGHT: 64 IN | DIASTOLIC BLOOD PRESSURE: 70 MMHG | SYSTOLIC BLOOD PRESSURE: 130 MMHG | BODY MASS INDEX: 25.95 KG/M2 | WEIGHT: 152 LBS | OXYGEN SATURATION: 97 % | TEMPERATURE: 98.1 F

## 2024-02-16 DIAGNOSIS — R06.02 SHORTNESS OF BREATH: ICD-10-CM

## 2024-02-16 PROCEDURE — 93306 TTE W/DOPPLER COMPLETE: CPT

## 2024-02-16 PROCEDURE — 99214 OFFICE O/P EST MOD 30 MIN: CPT

## 2024-02-16 PROCEDURE — G2211 COMPLEX E/M VISIT ADD ON: CPT

## 2024-02-16 PROCEDURE — 71046 X-RAY EXAM CHEST 2 VIEWS: CPT | Mod: 26

## 2024-02-16 PROCEDURE — 93000 ELECTROCARDIOGRAM COMPLETE: CPT

## 2024-02-16 PROCEDURE — 71046 X-RAY EXAM CHEST 2 VIEWS: CPT

## 2024-02-16 NOTE — HEALTH RISK ASSESSMENT
[0] : 2) Feeling down, depressed, or hopeless: Not at all (0) [PHQ-2 Negative - No further assessment needed] : PHQ-2 Negative - No further assessment needed [Never] : Never [[id number]] : 0

## 2024-02-16 NOTE — HISTORY OF PRESENT ILLNESS
[Family Member] : family member [FreeTextEntry1] : ZAVALETA [de-identified] : This is an 84 y/o female with a PMHx of HTN, Hypothyroidism, HLD, and Vitamin D Deficiency presents today for ZAVALETA. PT has chronic ZAVALETA but says for past 2 weeks its been a little worse and is intermittent, on some days she has it and other she doesnt. Today she does not have ZAVALETA. Had similar feeling in October 2023 and saw Dr. Perry and was rec for TTE and Exercise stress echo but she never did the tests as Reports her thyroid medication was changed to 88 mcg in Oct and had started to feel better.  Denies chest pain, sob at rest, dizziness, orthopnea, diaphoresis, palpitations, LE swelling, orthopnea, syncope, n/v, headache. Denies acid reflux, Hx of blood clots, cough, f/c. TTE prelim today showed normal EF, moderate to severe TR, grade 1 DD, mild AR, mild to moderate MR.

## 2024-02-16 NOTE — PHYSICAL EXAM
[No Acute Distress] : no acute distress [Well Nourished] : well nourished [Well Developed] : well developed [Well-Appearing] : well-appearing [Normal Sclera/Conjunctiva] : normal sclera/conjunctiva [Normal Outer Ear/Nose] : the outer ears and nose were normal in appearance [Normal Oropharynx] : the oropharynx was normal [No JVD] : no jugular venous distention [No Lymphadenopathy] : no lymphadenopathy [Supple] : supple [No Respiratory Distress] : no respiratory distress  [No Accessory Muscle Use] : no accessory muscle use [Clear to Auscultation] : lungs were clear to auscultation bilaterally [Normal Rate] : normal rate  [Regular Rhythm] : with a regular rhythm [Normal S1, S2] : normal S1 and S2 [No Murmur] : no murmur heard [No Carotid Bruits] : no carotid bruits [No Varicosities] : no varicosities [Pedal Pulses Present] : the pedal pulses are present [No Edema] : there was no peripheral edema [No Extremity Clubbing/Cyanosis] : no extremity clubbing/cyanosis [Soft] : abdomen soft [Non Tender] : non-tender [Non-distended] : non-distended [Normal Bowel Sounds] : normal bowel sounds [Normal Anterior Cervical Nodes] : no anterior cervical lymphadenopathy [No CVA Tenderness] : no CVA  tenderness [No Spinal Tenderness] : no spinal tenderness [No Joint Swelling] : no joint swelling [Grossly Normal Strength/Tone] : grossly normal strength/tone [No Rash] : no rash [Coordination Grossly Intact] : coordination grossly intact [No Focal Deficits] : no focal deficits [Normal Gait] : normal gait [Alert and Oriented x3] : oriented to person, place, and time [de-identified] : nontoxic

## 2024-02-17 ENCOUNTER — RX RENEWAL (OUTPATIENT)
Age: 84
End: 2024-02-17

## 2024-02-17 LAB
ALBUMIN SERPL ELPH-MCNC: 4.2 G/DL
ALP BLD-CCNC: 56 U/L
ALT SERPL-CCNC: 12 U/L
ANION GAP SERPL CALC-SCNC: 13 MMOL/L
AST SERPL-CCNC: 21 U/L
BASOPHILS # BLD AUTO: 0.05 K/UL
BASOPHILS NFR BLD AUTO: 0.7 %
BILIRUB SERPL-MCNC: 0.3 MG/DL
BUN SERPL-MCNC: 22 MG/DL
CALCIUM SERPL-MCNC: 9.1 MG/DL
CHLORIDE SERPL-SCNC: 103 MMOL/L
CHOLEST SERPL-MCNC: 147 MG/DL
CK SERPL-CCNC: 64 U/L
CO2 SERPL-SCNC: 23 MMOL/L
CREAT SERPL-MCNC: 0.76 MG/DL
DEPRECATED D DIMER PPP IA-ACNC: <150 NG/ML DDU
EGFR: 79 ML/MIN/1.73M2
EOSINOPHIL # BLD AUTO: 0.23 K/UL
EOSINOPHIL NFR BLD AUTO: 3.3 %
ESTIMATED AVERAGE GLUCOSE: 105 MG/DL
GLUCOSE SERPL-MCNC: 67 MG/DL
HBA1C MFR BLD HPLC: 5.3 %
HCT VFR BLD CALC: 39.7 %
HDLC SERPL-MCNC: 55 MG/DL
HGB BLD-MCNC: 13 G/DL
IMM GRANULOCYTES NFR BLD AUTO: 0.1 %
LDLC SERPL CALC-MCNC: 74 MG/DL
LYMPHOCYTES # BLD AUTO: 1.61 K/UL
LYMPHOCYTES NFR BLD AUTO: 22.8 %
MAN DIFF?: NORMAL
MCHC RBC-ENTMCNC: 29.7 PG
MCHC RBC-ENTMCNC: 32.7 GM/DL
MCV RBC AUTO: 90.6 FL
MONOCYTES # BLD AUTO: 0.67 K/UL
MONOCYTES NFR BLD AUTO: 9.5 %
NEUTROPHILS # BLD AUTO: 4.49 K/UL
NEUTROPHILS NFR BLD AUTO: 63.6 %
NONHDLC SERPL-MCNC: 93 MG/DL
NT-PROBNP SERPL-MCNC: 719 PG/ML
PLATELET # BLD AUTO: 161 K/UL
POTASSIUM SERPL-SCNC: 3.9 MMOL/L
PROT SERPL-MCNC: 7.3 G/DL
RBC # BLD: 4.38 M/UL
RBC # FLD: 13.6 %
SODIUM SERPL-SCNC: 139 MMOL/L
T4 FREE SERPL-MCNC: 1.7 NG/DL
TRIGL SERPL-MCNC: 104 MG/DL
TSH SERPL-ACNC: 0.77 UIU/ML
WBC # FLD AUTO: 7.06 K/UL

## 2024-02-22 ENCOUNTER — APPOINTMENT (OUTPATIENT)
Dept: CARDIOLOGY | Facility: CLINIC | Age: 84
End: 2024-02-22

## 2024-02-23 ENCOUNTER — APPOINTMENT (OUTPATIENT)
Dept: CARDIOLOGY | Facility: CLINIC | Age: 84
End: 2024-02-23
Payer: MEDICARE

## 2024-02-23 PROCEDURE — 93351 STRESS TTE COMPLETE: CPT

## 2024-04-13 ENCOUNTER — RX RENEWAL (OUTPATIENT)
Age: 84
End: 2024-04-13

## 2024-04-18 ENCOUNTER — RX RENEWAL (OUTPATIENT)
Age: 84
End: 2024-04-18

## 2024-05-09 ENCOUNTER — APPOINTMENT (OUTPATIENT)
Dept: CARDIOLOGY | Facility: CLINIC | Age: 84
End: 2024-05-09
Payer: MEDICARE

## 2024-05-09 VITALS
TEMPERATURE: 98.8 F | WEIGHT: 152.5 LBS | HEART RATE: 77 BPM | RESPIRATION RATE: 17 BRPM | HEIGHT: 64 IN | OXYGEN SATURATION: 98 % | BODY MASS INDEX: 26.03 KG/M2 | DIASTOLIC BLOOD PRESSURE: 70 MMHG | SYSTOLIC BLOOD PRESSURE: 150 MMHG

## 2024-05-09 DIAGNOSIS — R94.31 ABNORMAL ELECTROCARDIOGRAM [ECG] [EKG]: ICD-10-CM

## 2024-05-09 DIAGNOSIS — I10 ESSENTIAL (PRIMARY) HYPERTENSION: ICD-10-CM

## 2024-05-09 DIAGNOSIS — R06.02 SHORTNESS OF BREATH: ICD-10-CM

## 2024-05-09 DIAGNOSIS — I07.1 RHEUMATIC TRICUSPID INSUFFICIENCY: ICD-10-CM

## 2024-05-09 DIAGNOSIS — E03.9 HYPOTHYROIDISM, UNSPECIFIED: ICD-10-CM

## 2024-05-09 DIAGNOSIS — Z13.228 ENCOUNTER FOR SCREENING FOR OTHER METABOLIC DISORDERS: ICD-10-CM

## 2024-05-09 DIAGNOSIS — E78.5 HYPERLIPIDEMIA, UNSPECIFIED: ICD-10-CM

## 2024-05-09 DIAGNOSIS — R39.9 UNSPECIFIED SYMPTOMS AND SIGNS INVOLVING THE GENITOURINARY SYSTEM: ICD-10-CM

## 2024-05-09 PROCEDURE — 93000 ELECTROCARDIOGRAM COMPLETE: CPT

## 2024-05-09 PROCEDURE — 99214 OFFICE O/P EST MOD 30 MIN: CPT

## 2024-05-09 PROCEDURE — G2211 COMPLEX E/M VISIT ADD ON: CPT

## 2024-05-09 RX ORDER — SIMVASTATIN 10 MG/1
10 TABLET, FILM COATED ORAL
Qty: 90 | Refills: 1 | Status: ACTIVE | COMMUNITY
Start: 2019-04-05 | End: 1900-01-01

## 2024-05-09 RX ORDER — CANDESARTAN CILEXETIL 32 MG/1
32 TABLET ORAL
Qty: 90 | Refills: 1 | Status: ACTIVE | COMMUNITY
Start: 2019-04-09 | End: 1900-01-01

## 2024-05-09 RX ORDER — METOPROLOL SUCCINATE 50 MG/1
50 TABLET, EXTENDED RELEASE ORAL
Qty: 180 | Refills: 1 | Status: ACTIVE | COMMUNITY
Start: 2019-04-05 | End: 1900-01-01

## 2024-05-09 RX ORDER — HYDROCHLOROTHIAZIDE 12.5 MG/1
12.5 CAPSULE ORAL
Qty: 90 | Refills: 1 | Status: ACTIVE | COMMUNITY
Start: 2020-11-24 | End: 1900-01-01

## 2024-05-09 NOTE — HISTORY OF PRESENT ILLNESS
[FreeTextEntry1] : This is an 84 y/o female with a pmhx of  HTN, Hypothyroidism, HLD, and Vitamin D Deficiency presents today for follow up. Patient was last seen in the office 10/3/23 reporting SOB. CXR same day showed clear lungs. She was seen by Dr. Smart 2/16/24 also reporting SOB. Echo 2/2024 with normal EF, mod-severe TR. Patient had normal SOLOMON 2/2024. CXR showed clear lungs, ddimer negative. Patient reports SOB has improved.  Patient denies chest pain, dyspnea, palpitations, dizziness, syncope, changes in bowel/bladder habits or appetite.

## 2024-05-13 PROBLEM — R39.9 UTI SYMPTOMS: Status: ACTIVE | Noted: 2020-11-30

## 2024-05-13 LAB
ALBUMIN SERPL ELPH-MCNC: 4.1 G/DL
ALP BLD-CCNC: 54 U/L
ALT SERPL-CCNC: 12 U/L
ANION GAP SERPL CALC-SCNC: 13 MMOL/L
AST SERPL-CCNC: 21 U/L
BASOPHILS # BLD AUTO: 0.04 K/UL
BASOPHILS # BLD AUTO: 0.04 K/UL
BASOPHILS NFR BLD AUTO: 0.6 %
BASOPHILS NFR BLD AUTO: 0.7 %
BILIRUB DIRECT SERPL-MCNC: 0.1 MG/DL
BILIRUB INDIRECT SERPL-MCNC: 0.3 MG/DL
BILIRUB SERPL-MCNC: 0.4 MG/DL
BUN SERPL-MCNC: 19 MG/DL
CALCIUM SERPL-MCNC: 9.1 MG/DL
CHLORIDE SERPL-SCNC: 104 MMOL/L
CHOLEST SERPL-MCNC: 145 MG/DL
CK SERPL-CCNC: 104 U/L
CO2 SERPL-SCNC: 22 MMOL/L
CREAT SERPL-MCNC: 0.71 MG/DL
EGFR: 85 ML/MIN/1.73M2
EOSINOPHIL # BLD AUTO: 0.17 K/UL
EOSINOPHIL # BLD AUTO: 0.2 K/UL
EOSINOPHIL NFR BLD AUTO: 2.4 %
EOSINOPHIL NFR BLD AUTO: 3.3 %
ESTIMATED AVERAGE GLUCOSE: 103 MG/DL
GLUCOSE SERPL-MCNC: 85 MG/DL
HBA1C MFR BLD HPLC: 5.2 %
HCT VFR BLD CALC: 38.4 %
HCT VFR BLD CALC: 38.5 %
HDLC SERPL-MCNC: 51 MG/DL
HGB BLD-MCNC: 11.9 G/DL
HGB BLD-MCNC: 12.5 G/DL
IMM GRANULOCYTES NFR BLD AUTO: 0.1 %
IMM GRANULOCYTES NFR BLD AUTO: 0.2 %
LDLC SERPL CALC-MCNC: 76 MG/DL
LYMPHOCYTES # BLD AUTO: 1.51 K/UL
LYMPHOCYTES # BLD AUTO: 1.57 K/UL
LYMPHOCYTES NFR BLD AUTO: 22.4 %
LYMPHOCYTES NFR BLD AUTO: 24.9 %
MAN DIFF?: NORMAL
MAN DIFF?: NORMAL
MCHC RBC-ENTMCNC: 28.7 PG
MCHC RBC-ENTMCNC: 29.6 PG
MCHC RBC-ENTMCNC: 30.9 GM/DL
MCHC RBC-ENTMCNC: 32.6 GM/DL
MCV RBC AUTO: 90.8 FL
MCV RBC AUTO: 92.8 FL
MONOCYTES # BLD AUTO: 0.54 K/UL
MONOCYTES # BLD AUTO: 0.73 K/UL
MONOCYTES NFR BLD AUTO: 10.4 %
MONOCYTES NFR BLD AUTO: 8.9 %
NEUTROPHILS # BLD AUTO: 3.76 K/UL
NEUTROPHILS # BLD AUTO: 4.48 K/UL
NEUTROPHILS NFR BLD AUTO: 62 %
NEUTROPHILS NFR BLD AUTO: 64.1 %
NONHDLC SERPL-MCNC: 95 MG/DL
PLATELET # BLD AUTO: 152 K/UL
PLATELET # BLD AUTO: 160 K/UL
POTASSIUM SERPL-SCNC: 3.7 MMOL/L
PROT SERPL-MCNC: 7 G/DL
RBC # BLD: 4.15 M/UL
RBC # BLD: 4.23 M/UL
RBC # FLD: 14 %
RBC # FLD: 14.3 %
SODIUM SERPL-SCNC: 139 MMOL/L
T4 FREE SERPL-MCNC: 1.7 NG/DL
TRIGL SERPL-MCNC: 103 MG/DL
TSH SERPL-ACNC: 0.99 UIU/ML
WBC # FLD AUTO: 6.06 K/UL
WBC # FLD AUTO: 7 K/UL

## 2024-05-14 LAB
25(OH)D3 SERPL-MCNC: 26.5 NG/ML
25(OH)D3 SERPL-MCNC: 31.5 NG/ML
ALBUMIN SERPL ELPH-MCNC: 4.1 G/DL
ALBUMIN SERPL ELPH-MCNC: 4.4 G/DL
ALP BLD-CCNC: 54 U/L
ALP BLD-CCNC: 56 U/L
ALT SERPL-CCNC: 12 U/L
ALT SERPL-CCNC: 12 U/L
ANION GAP SERPL CALC-SCNC: 11 MMOL/L
ANION GAP SERPL CALC-SCNC: 12 MMOL/L
APPEARANCE: CLEAR
AST SERPL-CCNC: 19 U/L
AST SERPL-CCNC: 19 U/L
BACTERIA UR CULT: ABNORMAL
BACTERIA UR CULT: NORMAL
BACTERIA: ABNORMAL
BACTERIA: ABNORMAL /HPF
BACTERIA: NEGATIVE /HPF
BASOPHILS # BLD AUTO: 0.04 K/UL
BASOPHILS NFR BLD AUTO: 0.5 %
BILIRUB DIRECT SERPL-MCNC: 0.1 MG/DL
BILIRUB DIRECT SERPL-MCNC: 0.1 MG/DL
BILIRUB INDIRECT SERPL-MCNC: 0.2 MG/DL
BILIRUB INDIRECT SERPL-MCNC: 0.2 MG/DL
BILIRUB SERPL-MCNC: 0.3 MG/DL
BILIRUB SERPL-MCNC: 0.4 MG/DL
BILIRUBIN URINE: NEGATIVE
BLOOD URINE: ABNORMAL
BUN SERPL-MCNC: 24 MG/DL
BUN SERPL-MCNC: 27 MG/DL
CALCIUM SERPL-MCNC: 9.5 MG/DL
CALCIUM SERPL-MCNC: 9.5 MG/DL
CAST: 0 /LPF
CAST: 0 /LPF
CHLORIDE SERPL-SCNC: 103 MMOL/L
CHLORIDE SERPL-SCNC: 104 MMOL/L
CHOLEST SERPL-MCNC: 157 MG/DL
CHOLEST SERPL-MCNC: 168 MG/DL
CK SERPL-CCNC: 61 U/L
CK SERPL-CCNC: 69 U/L
CO2 SERPL-SCNC: 24 MMOL/L
CO2 SERPL-SCNC: 25 MMOL/L
COLOR: NORMAL
COLOR: YELLOW
COLOR: YELLOW
CREAT SERPL-MCNC: 0.64 MG/DL
CREAT SERPL-MCNC: 0.78 MG/DL
EGFR: 77 ML/MIN/1.73M2
EGFR: 89 ML/MIN/1.73M2
EOSINOPHIL # BLD AUTO: 0.19 K/UL
EOSINOPHIL NFR BLD AUTO: 2.3 %
EPITHELIAL CELLS: 1 /HPF
EPITHELIAL CELLS: 1 /HPF
ESTIMATED AVERAGE GLUCOSE: 103 MG/DL
ESTIMATED AVERAGE GLUCOSE: 105 MG/DL
GLUCOSE QUALITATIVE U: NEGATIVE
GLUCOSE QUALITATIVE U: NEGATIVE MG/DL
GLUCOSE QUALITATIVE U: NEGATIVE MG/DL
GLUCOSE SERPL-MCNC: 87 MG/DL
GLUCOSE SERPL-MCNC: 89 MG/DL
HBA1C MFR BLD HPLC: 5.2 %
HBA1C MFR BLD HPLC: 5.3 %
HCT VFR BLD CALC: 38.6 %
HDLC SERPL-MCNC: 50 MG/DL
HDLC SERPL-MCNC: 53 MG/DL
HGB BLD-MCNC: 12.4 G/DL
HYALINE CASTS: 0 /LPF
IMM GRANULOCYTES NFR BLD AUTO: 0.2 %
KETONES URINE: NEGATIVE
KETONES URINE: NEGATIVE MG/DL
KETONES URINE: NEGATIVE MG/DL
LDLC SERPL CALC-MCNC: 86 MG/DL
LDLC SERPL CALC-MCNC: 88 MG/DL
LDLC SERPL DIRECT ASSAY-MCNC: 85 MG/DL
LEUKOCYTE ESTERASE URINE: ABNORMAL
LYMPHOCYTES # BLD AUTO: 1.73 K/UL
LYMPHOCYTES NFR BLD AUTO: 20.9 %
MAGNESIUM SERPL-MCNC: 1.9 MG/DL
MAGNESIUM SERPL-MCNC: 2 MG/DL
MAN DIFF?: NORMAL
MCHC RBC-ENTMCNC: 29.7 PG
MCHC RBC-ENTMCNC: 32.1 GM/DL
MCV RBC AUTO: 92.6 FL
MICROSCOPIC-UA: NORMAL
MONOCYTES # BLD AUTO: 0.7 K/UL
MONOCYTES NFR BLD AUTO: 8.5 %
NEUTROPHILS # BLD AUTO: 5.6 K/UL
NEUTROPHILS NFR BLD AUTO: 67.6 %
NITRITE URINE: NEGATIVE
NITRITE URINE: POSITIVE
NITRITE URINE: POSITIVE
NONHDLC SERPL-MCNC: 108 MG/DL
NONHDLC SERPL-MCNC: 115 MG/DL
PH URINE: 5.5
PH URINE: 5.5
PH URINE: 6
PHOSPHATE SERPL-MCNC: 4.2 MG/DL
PHOSPHATE SERPL-MCNC: 4.2 MG/DL
PLATELET # BLD AUTO: 176 K/UL
POTASSIUM SERPL-SCNC: 3.6 MMOL/L
POTASSIUM SERPL-SCNC: 3.8 MMOL/L
PROT SERPL-MCNC: 7 G/DL
PROT SERPL-MCNC: 7.3 G/DL
PROTEIN URINE: NEGATIVE
PROTEIN URINE: NEGATIVE MG/DL
PROTEIN URINE: NEGATIVE MG/DL
RBC # BLD: 4.17 M/UL
RBC # FLD: 13.6 %
RED BLOOD CELLS URINE: 0 /HPF
RED BLOOD CELLS URINE: 1 /HPF
RED BLOOD CELLS URINE: 2 /HPF
REVIEW: NORMAL
REVIEW: NORMAL
SODIUM SERPL-SCNC: 138 MMOL/L
SODIUM SERPL-SCNC: 140 MMOL/L
SPECIFIC GRAVITY URINE: 1.01
SPECIFIC GRAVITY URINE: 1.02
SPECIFIC GRAVITY URINE: 1.02
SQUAMOUS EPITHELIAL CELLS: 1 /HPF
T3FREE SERPL-MCNC: 2.64 PG/ML
T4 FREE SERPL-MCNC: 1.6 NG/DL
T4 FREE SERPL-MCNC: 1.7 NG/DL
T4 FREE SERPL-MCNC: 1.9 NG/DL
T4 SERPL-MCNC: 9.2 UG/DL
TRIGL SERPL-MCNC: 109 MG/DL
TRIGL SERPL-MCNC: 145 MG/DL
TSH SERPL-ACNC: 0.24 UIU/ML
TSH SERPL-ACNC: 0.31 UIU/ML
TSH SERPL-ACNC: 0.45 UIU/ML
TSH SERPL-ACNC: 0.94 UIU/ML
UROBILINOGEN URINE: 0.2 MG/DL
UROBILINOGEN URINE: 0.2 MG/DL
UROBILINOGEN URINE: NORMAL
WBC # FLD AUTO: 8.28 K/UL
WHITE BLOOD CELLS URINE: 1 /HPF
WHITE BLOOD CELLS URINE: 5 /HPF
WHITE BLOOD CELLS URINE: 5 /HPF

## 2024-05-18 ENCOUNTER — RX RENEWAL (OUTPATIENT)
Age: 84
End: 2024-05-18

## 2024-06-16 ENCOUNTER — RX RENEWAL (OUTPATIENT)
Age: 84
End: 2024-06-16

## 2024-06-16 RX ORDER — LEVOTHYROXINE SODIUM 0.09 MG/1
88 TABLET ORAL DAILY
Qty: 90 | Refills: 1 | Status: ACTIVE | COMMUNITY
Start: 2019-04-05 | End: 1900-01-01

## 2024-08-16 ENCOUNTER — RX RENEWAL (OUTPATIENT)
Age: 84
End: 2024-08-16

## 2025-05-12 ENCOUNTER — RX RENEWAL (OUTPATIENT)
Age: 85
End: 2025-05-12

## 2025-05-30 ENCOUNTER — APPOINTMENT (OUTPATIENT)
Dept: CARDIOLOGY | Facility: CLINIC | Age: 85
End: 2025-05-30
Payer: MEDICARE

## 2025-05-30 ENCOUNTER — NON-APPOINTMENT (OUTPATIENT)
Age: 85
End: 2025-05-30

## 2025-05-30 ENCOUNTER — APPOINTMENT (OUTPATIENT)
Dept: INTERNAL MEDICINE | Facility: CLINIC | Age: 85
End: 2025-05-30
Payer: MEDICARE

## 2025-05-30 VITALS
HEART RATE: 75 BPM | OXYGEN SATURATION: 97 % | HEIGHT: 64 IN | DIASTOLIC BLOOD PRESSURE: 74 MMHG | WEIGHT: 149 LBS | BODY MASS INDEX: 25.44 KG/M2 | SYSTOLIC BLOOD PRESSURE: 122 MMHG | RESPIRATION RATE: 16 BRPM | TEMPERATURE: 98.8 F

## 2025-05-30 VITALS — DIASTOLIC BLOOD PRESSURE: 80 MMHG | SYSTOLIC BLOOD PRESSURE: 135 MMHG

## 2025-05-30 DIAGNOSIS — Z00.00 ENCOUNTER FOR GENERAL ADULT MEDICAL EXAMINATION W/OUT ABNORMAL FINDINGS: ICD-10-CM

## 2025-05-30 DIAGNOSIS — Z13.228 ENCOUNTER FOR SCREENING FOR OTHER METABOLIC DISORDERS: ICD-10-CM

## 2025-05-30 DIAGNOSIS — Z12.9 ENCOUNTER FOR SCREENING FOR MALIGNANT NEOPLASM, SITE UNSPECIFIED: ICD-10-CM

## 2025-05-30 DIAGNOSIS — Z71.85 ENCOUNTER FOR IMMUNIZATION SAFETY COUNSELING: ICD-10-CM

## 2025-05-30 DIAGNOSIS — E78.5 HYPERLIPIDEMIA, UNSPECIFIED: ICD-10-CM

## 2025-05-30 DIAGNOSIS — E03.9 HYPOTHYROIDISM, UNSPECIFIED: ICD-10-CM

## 2025-05-30 PROBLEM — F41.9 ANXIETY: Status: ACTIVE | Noted: 2025-05-30

## 2025-05-30 PROCEDURE — 93970 EXTREMITY STUDY: CPT

## 2025-05-30 PROCEDURE — 93000 ELECTROCARDIOGRAM COMPLETE: CPT

## 2025-05-30 PROCEDURE — 99387 INIT PM E/M NEW PAT 65+ YRS: CPT | Mod: GY

## 2025-05-30 PROCEDURE — 99214 OFFICE O/P EST MOD 30 MIN: CPT | Mod: 25

## 2025-05-30 PROCEDURE — 93306 TTE W/DOPPLER COMPLETE: CPT

## 2025-05-30 PROCEDURE — 99397 PER PM REEVAL EST PAT 65+ YR: CPT

## 2025-05-31 PROBLEM — I77.810 DILATED AORTIC ROOT: Status: ACTIVE | Noted: 2025-05-31

## 2025-05-31 LAB
25(OH)D3 SERPL-MCNC: 29 NG/ML
ALBUMIN SERPL ELPH-MCNC: 4.3 G/DL
ALP BLD-CCNC: 67 U/L
ALT SERPL-CCNC: 16 U/L
ANION GAP SERPL CALC-SCNC: 14 MMOL/L
APPEARANCE: CLEAR
AST SERPL-CCNC: 18 U/L
BACTERIA: NEGATIVE /HPF
BASOPHILS # BLD AUTO: 0.05 K/UL
BASOPHILS NFR BLD AUTO: 0.7 %
BILIRUB SERPL-MCNC: 0.4 MG/DL
BILIRUBIN URINE: NEGATIVE
BLOOD URINE: ABNORMAL
BUN SERPL-MCNC: 18 MG/DL
CALCIUM SERPL-MCNC: 9.3 MG/DL
CAST: 0 /LPF
CHLORIDE SERPL-SCNC: 103 MMOL/L
CHOLEST SERPL-MCNC: 147 MG/DL
CK SERPL-CCNC: 59 U/L
CO2 SERPL-SCNC: 22 MMOL/L
COLOR: YELLOW
CREAT SERPL-MCNC: 0.72 MG/DL
CREAT SPEC-SCNC: 46 MG/DL
EGFRCR SERPLBLD CKD-EPI 2021: 83 ML/MIN/1.73M2
EOSINOPHIL # BLD AUTO: 0.18 K/UL
EOSINOPHIL NFR BLD AUTO: 2.5 %
EPITHELIAL CELLS: 1 /HPF
ESTIMATED AVERAGE GLUCOSE: 103 MG/DL
FERRITIN SERPL-MCNC: 89 NG/ML
FOLATE SERPL-MCNC: 13.9 NG/ML
GLUCOSE QUALITATIVE U: NEGATIVE MG/DL
GLUCOSE SERPL-MCNC: 92 MG/DL
HBA1C MFR BLD HPLC: 5.2 %
HCT VFR BLD CALC: 38 %
HDLC SERPL-MCNC: 51 MG/DL
HGB BLD-MCNC: 12.2 G/DL
IMM GRANULOCYTES NFR BLD AUTO: 0.1 %
IRON SATN MFR SERPL: 26 %
IRON SERPL-MCNC: 81 UG/DL
KETONES URINE: NEGATIVE MG/DL
LDLC SERPL-MCNC: 74 MG/DL
LEUKOCYTE ESTERASE URINE: ABNORMAL
LYMPHOCYTES # BLD AUTO: 1.55 K/UL
LYMPHOCYTES NFR BLD AUTO: 21.1 %
MAN DIFF?: NORMAL
MCHC RBC-ENTMCNC: 29.2 PG
MCHC RBC-ENTMCNC: 32.1 G/DL
MCV RBC AUTO: 90.9 FL
MICROALBUMIN 24H UR DL<=1MG/L-MCNC: 3.1 MG/DL
MICROALBUMIN/CREAT 24H UR-RTO: 67 MG/G
MICROSCOPIC-UA: NORMAL
MONOCYTES # BLD AUTO: 0.69 K/UL
MONOCYTES NFR BLD AUTO: 9.4 %
NEUTROPHILS # BLD AUTO: 4.86 K/UL
NEUTROPHILS NFR BLD AUTO: 66.2 %
NITRITE URINE: NEGATIVE
NONHDLC SERPL-MCNC: 96 MG/DL
NT-PROBNP SERPL-MCNC: 776 PG/ML
PH URINE: 6
PLATELET # BLD AUTO: 162 K/UL
POTASSIUM SERPL-SCNC: 3.8 MMOL/L
PROT SERPL-MCNC: 7.1 G/DL
PROTEIN URINE: NEGATIVE MG/DL
RBC # BLD: 4.18 M/UL
RBC # FLD: 14 %
RED BLOOD CELLS URINE: 4 /HPF
SODIUM SERPL-SCNC: 140 MMOL/L
SPECIFIC GRAVITY URINE: 1.01
T4 FREE SERPL-MCNC: 1.8 NG/DL
TIBC SERPL-MCNC: 310 UG/DL
TRIGL SERPL-MCNC: 127 MG/DL
TSH SERPL-ACNC: 1.04 UIU/ML
UIBC SERPL-MCNC: 229 UG/DL
URATE SERPL-MCNC: 6.6 MG/DL
UROBILINOGEN URINE: 0.2 MG/DL
VIT B12 SERPL-MCNC: 360 PG/ML
WBC # FLD AUTO: 7.34 K/UL
WHITE BLOOD CELLS URINE: 5 /HPF

## 2025-05-31 RX ORDER — FUROSEMIDE 20 MG/1
20 TABLET ORAL DAILY
Qty: 30 | Refills: 0 | Status: ACTIVE | COMMUNITY
Start: 2025-05-31 | End: 1900-01-01

## 2025-06-01 PROBLEM — M79.89 LEG SWELLING: Status: ACTIVE | Noted: 2025-05-30

## 2025-06-06 ENCOUNTER — APPOINTMENT (OUTPATIENT)
Dept: INTERNAL MEDICINE | Facility: CLINIC | Age: 85
End: 2025-06-06
Payer: MEDICARE

## 2025-06-06 VITALS
WEIGHT: 149 LBS | RESPIRATION RATE: 18 BRPM | BODY MASS INDEX: 25.44 KG/M2 | DIASTOLIC BLOOD PRESSURE: 80 MMHG | SYSTOLIC BLOOD PRESSURE: 140 MMHG | HEIGHT: 64 IN | HEART RATE: 90 BPM | OXYGEN SATURATION: 99 %

## 2025-06-06 PROCEDURE — G2211 COMPLEX E/M VISIT ADD ON: CPT

## 2025-06-06 PROCEDURE — 99214 OFFICE O/P EST MOD 30 MIN: CPT

## 2025-06-07 ENCOUNTER — NON-APPOINTMENT (OUTPATIENT)
Age: 85
End: 2025-06-07

## 2025-06-09 LAB
ANION GAP SERPL CALC-SCNC: 13 MMOL/L
BUN SERPL-MCNC: 18 MG/DL
CALCIUM SERPL-MCNC: 8.7 MG/DL
CHLORIDE SERPL-SCNC: 104 MMOL/L
CO2 SERPL-SCNC: 22 MMOL/L
CREAT SERPL-MCNC: 0.82 MG/DL
EGFRCR SERPLBLD CKD-EPI 2021: 71 ML/MIN/1.73M2
GLUCOSE SERPL-MCNC: 90 MG/DL
NT-PROBNP SERPL-MCNC: 932 PG/ML
POTASSIUM SERPL-SCNC: 3.8 MMOL/L
SODIUM SERPL-SCNC: 139 MMOL/L

## 2025-06-19 LAB
ANION GAP SERPL CALC-SCNC: 14 MMOL/L
BUN SERPL-MCNC: 21 MG/DL
CALCIUM SERPL-MCNC: 8.9 MG/DL
CHLORIDE SERPL-SCNC: 106 MMOL/L
CO2 SERPL-SCNC: 21 MMOL/L
CREAT SERPL-MCNC: 0.71 MG/DL
EGFRCR SERPLBLD CKD-EPI 2021: 85 ML/MIN/1.73M2
GLUCOSE SERPL-MCNC: 94 MG/DL
NT-PROBNP SERPL-MCNC: 692 PG/ML
POTASSIUM SERPL-SCNC: 4.1 MMOL/L
SODIUM SERPL-SCNC: 141 MMOL/L

## 2025-07-05 ENCOUNTER — INPATIENT (INPATIENT)
Facility: HOSPITAL | Age: 85
LOS: 8 days | Discharge: NURSING, REHAB OR LONG-TERM CARE | DRG: 66 | End: 2025-07-14
Attending: PSYCHIATRY & NEUROLOGY | Admitting: STUDENT IN AN ORGANIZED HEALTH CARE EDUCATION/TRAINING PROGRAM
Payer: MEDICARE

## 2025-07-05 ENCOUNTER — EMERGENCY (EMERGENCY)
Facility: HOSPITAL | Age: 85
LOS: 1 days | End: 2025-07-05
Attending: INTERNAL MEDICINE | Admitting: INTERNAL MEDICINE
Payer: MEDICARE

## 2025-07-05 VITALS
RESPIRATION RATE: 17 BRPM | HEART RATE: 62 BPM | HEIGHT: 64 IN | TEMPERATURE: 97 F | DIASTOLIC BLOOD PRESSURE: 69 MMHG | SYSTOLIC BLOOD PRESSURE: 147 MMHG | OXYGEN SATURATION: 99 % | WEIGHT: 143.3 LBS

## 2025-07-05 VITALS
SYSTOLIC BLOOD PRESSURE: 150 MMHG | OXYGEN SATURATION: 96 % | RESPIRATION RATE: 17 BRPM | DIASTOLIC BLOOD PRESSURE: 91 MMHG | HEART RATE: 64 BPM

## 2025-07-05 VITALS
RESPIRATION RATE: 18 BRPM | SYSTOLIC BLOOD PRESSURE: 127 MMHG | HEART RATE: 98 BPM | OXYGEN SATURATION: 95 % | WEIGHT: 142.2 LBS | TEMPERATURE: 98 F | DIASTOLIC BLOOD PRESSURE: 58 MMHG

## 2025-07-05 DIAGNOSIS — I61.9 NONTRAUMATIC INTRACEREBRAL HEMORRHAGE, UNSPECIFIED: ICD-10-CM

## 2025-07-05 LAB
A1C WITH ESTIMATED AVERAGE GLUCOSE RESULT: 5.4 % — SIGNIFICANT CHANGE UP (ref 4–5.6)
ALBUMIN SERPL ELPH-MCNC: 3.7 G/DL — SIGNIFICANT CHANGE UP (ref 3.3–5)
ALBUMIN SERPL ELPH-MCNC: 4.2 G/DL — SIGNIFICANT CHANGE UP (ref 3.3–5)
ALP SERPL-CCNC: 66 U/L — SIGNIFICANT CHANGE UP (ref 40–120)
ALP SERPL-CCNC: 69 U/L — SIGNIFICANT CHANGE UP (ref 40–120)
ALT FLD-CCNC: 12 U/L — SIGNIFICANT CHANGE UP (ref 10–45)
ALT FLD-CCNC: 12 U/L — SIGNIFICANT CHANGE UP (ref 10–45)
AMMONIA BLD-MCNC: <10 UMOL/L — LOW (ref 11–55)
ANION GAP SERPL CALC-SCNC: 13 MMOL/L — SIGNIFICANT CHANGE UP (ref 5–17)
ANION GAP SERPL CALC-SCNC: 17 MMOL/L — SIGNIFICANT CHANGE UP (ref 5–17)
APTT BLD: 29.4 SEC — SIGNIFICANT CHANGE UP (ref 26.1–36.8)
AST SERPL-CCNC: 19 U/L — SIGNIFICANT CHANGE UP (ref 10–40)
AST SERPL-CCNC: 20 U/L — SIGNIFICANT CHANGE UP (ref 10–40)
BASOPHILS # BLD AUTO: 0.04 K/UL — SIGNIFICANT CHANGE UP (ref 0–0.2)
BASOPHILS NFR BLD AUTO: 0.5 % — SIGNIFICANT CHANGE UP (ref 0–2)
BILIRUB SERPL-MCNC: 0.6 MG/DL — SIGNIFICANT CHANGE UP (ref 0.2–1.2)
BILIRUB SERPL-MCNC: 0.6 MG/DL — SIGNIFICANT CHANGE UP (ref 0.2–1.2)
BLD GP AB SCN SERPL QL: NEGATIVE — SIGNIFICANT CHANGE UP
BUN SERPL-MCNC: 20 MG/DL — SIGNIFICANT CHANGE UP (ref 7–23)
BUN SERPL-MCNC: 23 MG/DL — SIGNIFICANT CHANGE UP (ref 7–23)
CALCIUM SERPL-MCNC: 8.9 MG/DL — SIGNIFICANT CHANGE UP (ref 8.4–10.5)
CALCIUM SERPL-MCNC: 9 MG/DL — SIGNIFICANT CHANGE UP (ref 8.4–10.5)
CHLORIDE SERPL-SCNC: 104 MMOL/L — SIGNIFICANT CHANGE UP (ref 96–108)
CHLORIDE SERPL-SCNC: 99 MMOL/L — SIGNIFICANT CHANGE UP (ref 96–108)
CHOLEST SERPL-MCNC: 148 MG/DL — SIGNIFICANT CHANGE UP
CO2 SERPL-SCNC: 21 MMOL/L — LOW (ref 22–31)
CO2 SERPL-SCNC: 23 MMOL/L — SIGNIFICANT CHANGE UP (ref 22–31)
CREAT SERPL-MCNC: 0.67 MG/DL — SIGNIFICANT CHANGE UP (ref 0.5–1.3)
CREAT SERPL-MCNC: 0.74 MG/DL — SIGNIFICANT CHANGE UP (ref 0.5–1.3)
EGFR: 80 ML/MIN/1.73M2 — SIGNIFICANT CHANGE UP
EGFR: 80 ML/MIN/1.73M2 — SIGNIFICANT CHANGE UP
EGFR: 87 ML/MIN/1.73M2 — SIGNIFICANT CHANGE UP
EGFR: 87 ML/MIN/1.73M2 — SIGNIFICANT CHANGE UP
EOSINOPHIL # BLD AUTO: 0.31 K/UL — SIGNIFICANT CHANGE UP (ref 0–0.5)
EOSINOPHIL NFR BLD AUTO: 4.2 % — SIGNIFICANT CHANGE UP (ref 0–6)
ESTIMATED AVERAGE GLUCOSE: 108 MG/DL — SIGNIFICANT CHANGE UP (ref 68–114)
GLUCOSE SERPL-MCNC: 115 MG/DL — HIGH (ref 70–99)
GLUCOSE SERPL-MCNC: 142 MG/DL — HIGH (ref 70–99)
HCT VFR BLD CALC: 37.2 % — SIGNIFICANT CHANGE UP (ref 34.5–45)
HCT VFR BLD CALC: 39.8 % — SIGNIFICANT CHANGE UP (ref 34.5–45)
HDLC SERPL-MCNC: 56 MG/DL — SIGNIFICANT CHANGE UP
HGB BLD-MCNC: 12.3 G/DL — SIGNIFICANT CHANGE UP (ref 11.5–15.5)
HGB BLD-MCNC: 13.2 G/DL — SIGNIFICANT CHANGE UP (ref 11.5–15.5)
IMM GRANULOCYTES NFR BLD AUTO: 0.7 % — SIGNIFICANT CHANGE UP (ref 0–0.9)
INR BLD: 1.08 RATIO — SIGNIFICANT CHANGE UP (ref 0.85–1.16)
LDLC SERPL-MCNC: 79 MG/DL — SIGNIFICANT CHANGE UP
LIPID PNL WITH DIRECT LDL SERPL: 79 MG/DL — SIGNIFICANT CHANGE UP
LYMPHOCYTES # BLD AUTO: 1.78 K/UL — SIGNIFICANT CHANGE UP (ref 1–3.3)
LYMPHOCYTES # BLD AUTO: 24.2 % — SIGNIFICANT CHANGE UP (ref 13–44)
MAGNESIUM SERPL-MCNC: 1.7 MG/DL — SIGNIFICANT CHANGE UP (ref 1.6–2.6)
MCHC RBC-ENTMCNC: 29.4 PG — SIGNIFICANT CHANGE UP (ref 27–34)
MCHC RBC-ENTMCNC: 29.6 PG — SIGNIFICANT CHANGE UP (ref 27–34)
MCHC RBC-ENTMCNC: 33.1 G/DL — SIGNIFICANT CHANGE UP (ref 32–36)
MCHC RBC-ENTMCNC: 33.2 G/DL — SIGNIFICANT CHANGE UP (ref 32–36)
MCV RBC AUTO: 88.6 FL — SIGNIFICANT CHANGE UP (ref 80–100)
MCV RBC AUTO: 89.4 FL — SIGNIFICANT CHANGE UP (ref 80–100)
MONOCYTES # BLD AUTO: 0.7 K/UL — SIGNIFICANT CHANGE UP (ref 0–0.9)
MONOCYTES NFR BLD AUTO: 9.5 % — SIGNIFICANT CHANGE UP (ref 2–14)
MRSA PCR RESULT.: SIGNIFICANT CHANGE UP
NEUTROPHILS # BLD AUTO: 4.47 K/UL — SIGNIFICANT CHANGE UP (ref 1.8–7.4)
NEUTROPHILS NFR BLD AUTO: 60.9 % — SIGNIFICANT CHANGE UP (ref 43–77)
NONHDLC SERPL-MCNC: 92 MG/DL — SIGNIFICANT CHANGE UP
NRBC # BLD AUTO: 0 K/UL — SIGNIFICANT CHANGE UP (ref 0–0)
NRBC # FLD: 0 K/UL — SIGNIFICANT CHANGE UP (ref 0–0)
NRBC BLD AUTO-RTO: 0 /100 WBCS — SIGNIFICANT CHANGE UP (ref 0–0)
NRBC BLD AUTO-RTO: 0 /100 WBCS — SIGNIFICANT CHANGE UP (ref 0–0)
PHOSPHATE SERPL-MCNC: 3.2 MG/DL — SIGNIFICANT CHANGE UP (ref 2.5–4.5)
PLATELET # BLD AUTO: 138 K/UL — LOW (ref 150–400)
PLATELET # BLD AUTO: 150 K/UL — SIGNIFICANT CHANGE UP (ref 150–400)
PLATELET RESPONSE ASPIRIN RESULT: 420 ARU — SIGNIFICANT CHANGE UP
PMV BLD: 10.6 FL — SIGNIFICANT CHANGE UP (ref 7–13)
POTASSIUM SERPL-MCNC: 3.5 MMOL/L — SIGNIFICANT CHANGE UP (ref 3.5–5.3)
POTASSIUM SERPL-MCNC: 3.6 MMOL/L — SIGNIFICANT CHANGE UP (ref 3.5–5.3)
POTASSIUM SERPL-SCNC: 3.5 MMOL/L — SIGNIFICANT CHANGE UP (ref 3.5–5.3)
POTASSIUM SERPL-SCNC: 3.6 MMOL/L — SIGNIFICANT CHANGE UP (ref 3.5–5.3)
PROT SERPL-MCNC: 7.3 G/DL — SIGNIFICANT CHANGE UP (ref 6–8.3)
PROT SERPL-MCNC: 7.5 G/DL — SIGNIFICANT CHANGE UP (ref 6–8.3)
PROTHROM AB SERPL-ACNC: 12.4 SEC — SIGNIFICANT CHANGE UP (ref 9.9–13.4)
RBC # BLD: 4.16 M/UL — SIGNIFICANT CHANGE UP (ref 3.8–5.2)
RBC # BLD: 4.49 M/UL — SIGNIFICANT CHANGE UP (ref 3.8–5.2)
RBC # FLD: 13.8 % — SIGNIFICANT CHANGE UP (ref 10.3–14.5)
RBC # FLD: 14 % — SIGNIFICANT CHANGE UP (ref 10.3–14.5)
RH IG SCN BLD-IMP: POSITIVE — SIGNIFICANT CHANGE UP
S AUREUS DNA NOSE QL NAA+PROBE: SIGNIFICANT CHANGE UP
SODIUM SERPL-SCNC: 137 MMOL/L — SIGNIFICANT CHANGE UP (ref 135–145)
SODIUM SERPL-SCNC: 140 MMOL/L — SIGNIFICANT CHANGE UP (ref 135–145)
T3 SERPL-MCNC: 95 NG/DL — SIGNIFICANT CHANGE UP (ref 80–200)
T4 AB SER-ACNC: 10.2 UG/DL — SIGNIFICANT CHANGE UP (ref 4.6–12)
T4 FREE SERPL-MCNC: 1.7 NG/DL — SIGNIFICANT CHANGE UP (ref 0.9–1.8)
TRIGL SERPL-MCNC: 61 MG/DL — SIGNIFICANT CHANGE UP
TROPONIN I, HIGH SENSITIVITY RESULT: 10 NG/L — SIGNIFICANT CHANGE UP
TSH SERPL-MCNC: 0.75 UIU/ML — SIGNIFICANT CHANGE UP (ref 0.27–4.2)
WBC # BLD: 10.35 K/UL — SIGNIFICANT CHANGE UP (ref 3.8–10.5)
WBC # BLD: 7.35 K/UL — SIGNIFICANT CHANGE UP (ref 3.8–10.5)
WBC # FLD AUTO: 10.35 K/UL — SIGNIFICANT CHANGE UP (ref 3.8–10.5)
WBC # FLD AUTO: 7.35 K/UL — SIGNIFICANT CHANGE UP (ref 3.8–10.5)

## 2025-07-05 PROCEDURE — 84484 ASSAY OF TROPONIN QUANT: CPT

## 2025-07-05 PROCEDURE — 99291 CRITICAL CARE FIRST HOUR: CPT

## 2025-07-05 PROCEDURE — 93010 ELECTROCARDIOGRAM REPORT: CPT

## 2025-07-05 PROCEDURE — 85025 COMPLETE CBC W/AUTO DIFF WBC: CPT

## 2025-07-05 PROCEDURE — 72125 CT NECK SPINE W/O DYE: CPT | Mod: 26

## 2025-07-05 PROCEDURE — 82140 ASSAY OF AMMONIA: CPT

## 2025-07-05 PROCEDURE — 70450 CT HEAD/BRAIN W/O DYE: CPT | Mod: 26,59,77

## 2025-07-05 PROCEDURE — 70450 CT HEAD/BRAIN W/O DYE: CPT | Mod: 26

## 2025-07-05 PROCEDURE — 70498 CT ANGIOGRAPHY NECK: CPT | Mod: 26

## 2025-07-05 PROCEDURE — 70450 CT HEAD/BRAIN W/O DYE: CPT

## 2025-07-05 PROCEDURE — 80053 COMPREHEN METABOLIC PANEL: CPT

## 2025-07-05 PROCEDURE — 99222 1ST HOSP IP/OBS MODERATE 55: CPT | Mod: GC

## 2025-07-05 PROCEDURE — 70496 CT ANGIOGRAPHY HEAD: CPT | Mod: 26

## 2025-07-05 PROCEDURE — 93005 ELECTROCARDIOGRAM TRACING: CPT

## 2025-07-05 PROCEDURE — 36415 COLL VENOUS BLD VENIPUNCTURE: CPT

## 2025-07-05 PROCEDURE — 82962 GLUCOSE BLOOD TEST: CPT

## 2025-07-05 PROCEDURE — 93970 EXTREMITY STUDY: CPT | Mod: 26

## 2025-07-05 PROCEDURE — 70450 CT HEAD/BRAIN W/O DYE: CPT | Mod: 26,59

## 2025-07-05 PROCEDURE — 72125 CT NECK SPINE W/O DYE: CPT

## 2025-07-05 PROCEDURE — 99285 EMERGENCY DEPT VISIT HI MDM: CPT

## 2025-07-05 RX ORDER — METOPROLOL SUCCINATE 50 MG/1
25 TABLET, EXTENDED RELEASE ORAL
Refills: 0 | Status: DISCONTINUED | OUTPATIENT
Start: 2025-07-05 | End: 2025-07-05

## 2025-07-05 RX ORDER — LEVETIRACETAM 10 MG/ML
500 INJECTION, SOLUTION INTRAVENOUS ONCE
Refills: 0 | Status: COMPLETED | OUTPATIENT
Start: 2025-07-05 | End: 2025-07-05

## 2025-07-05 RX ORDER — ACETAMINOPHEN 500 MG/5ML
650 LIQUID (ML) ORAL EVERY 6 HOURS
Refills: 0 | Status: DISCONTINUED | OUTPATIENT
Start: 2025-07-05 | End: 2025-07-08

## 2025-07-05 RX ORDER — NICARDIPINE HCL 30 MG
5 CAPSULE ORAL
Qty: 40 | Refills: 0 | Status: DISCONTINUED | OUTPATIENT
Start: 2025-07-05 | End: 2025-07-05

## 2025-07-05 RX ORDER — CARVEDILOL 3.12 MG/1
25 TABLET, FILM COATED ORAL EVERY 12 HOURS
Refills: 0 | Status: DISCONTINUED | OUTPATIENT
Start: 2025-07-05 | End: 2025-07-06

## 2025-07-05 RX ORDER — LOSARTAN POTASSIUM 100 MG/1
100 TABLET, FILM COATED ORAL DAILY
Refills: 0 | Status: DISCONTINUED | OUTPATIENT
Start: 2025-07-05 | End: 2025-07-14

## 2025-07-05 RX ORDER — SENNA 187 MG
2 TABLET ORAL AT BEDTIME
Refills: 0 | Status: DISCONTINUED | OUTPATIENT
Start: 2025-07-05 | End: 2025-07-10

## 2025-07-05 RX ORDER — CALCIUM CARBONATE 750 MG/1
1 TABLET ORAL DAILY
Refills: 0 | Status: DISCONTINUED | OUTPATIENT
Start: 2025-07-05 | End: 2025-07-14

## 2025-07-05 RX ORDER — ONDANSETRON HCL/PF 4 MG/2 ML
4 VIAL (ML) INJECTION ONCE
Refills: 0 | Status: COMPLETED | OUTPATIENT
Start: 2025-07-05 | End: 2025-07-05

## 2025-07-05 RX ORDER — LEVETIRACETAM 10 MG/ML
500 INJECTION, SOLUTION INTRAVENOUS ONCE
Refills: 0 | Status: DISCONTINUED | OUTPATIENT
Start: 2025-07-05 | End: 2025-07-05

## 2025-07-05 RX ORDER — ATORVASTATIN CALCIUM 80 MG/1
10 TABLET, FILM COATED ORAL AT BEDTIME
Refills: 0 | Status: DISCONTINUED | OUTPATIENT
Start: 2025-07-05 | End: 2025-07-14

## 2025-07-05 RX ORDER — LABETALOL HYDROCHLORIDE 200 MG/1
10 TABLET, FILM COATED ORAL ONCE
Refills: 0 | Status: COMPLETED | OUTPATIENT
Start: 2025-07-05 | End: 2025-07-05

## 2025-07-05 RX ORDER — FUROSEMIDE 10 MG/ML
20 INJECTION INTRAMUSCULAR; INTRAVENOUS DAILY
Refills: 0 | Status: DISCONTINUED | OUTPATIENT
Start: 2025-07-05 | End: 2025-07-06

## 2025-07-05 RX ORDER — LEVOTHYROXINE SODIUM 300 MCG
88 TABLET ORAL DAILY
Refills: 0 | Status: DISCONTINUED | OUTPATIENT
Start: 2025-07-05 | End: 2025-07-14

## 2025-07-05 RX ORDER — LEVETIRACETAM 10 MG/ML
500 INJECTION, SOLUTION INTRAVENOUS EVERY 12 HOURS
Refills: 0 | Status: DISCONTINUED | OUTPATIENT
Start: 2025-07-05 | End: 2025-07-08

## 2025-07-05 RX ORDER — POLYETHYLENE GLYCOL 3350 17 G/17G
17 POWDER, FOR SOLUTION ORAL DAILY
Refills: 0 | Status: DISCONTINUED | OUTPATIENT
Start: 2025-07-05 | End: 2025-07-08

## 2025-07-05 RX ORDER — LEVETIRACETAM 10 MG/ML
500 INJECTION, SOLUTION INTRAVENOUS EVERY 12 HOURS
Refills: 0 | Status: DISCONTINUED | OUTPATIENT
Start: 2025-07-05 | End: 2025-07-05

## 2025-07-05 RX ORDER — SENNA 187 MG
2 TABLET ORAL AT BEDTIME
Refills: 0 | Status: DISCONTINUED | OUTPATIENT
Start: 2025-07-05 | End: 2025-07-08

## 2025-07-05 RX ORDER — MAGNESIUM SULFATE 500 MG/ML
2 SYRINGE (ML) INJECTION ONCE
Refills: 0 | Status: COMPLETED | OUTPATIENT
Start: 2025-07-05 | End: 2025-07-05

## 2025-07-05 RX ORDER — ACETAMINOPHEN 500 MG/5ML
650 LIQUID (ML) ORAL EVERY 6 HOURS
Refills: 0 | Status: DISCONTINUED | OUTPATIENT
Start: 2025-07-05 | End: 2025-07-14

## 2025-07-05 RX ORDER — NICARDIPINE HCL 30 MG
5 CAPSULE ORAL
Qty: 40 | Refills: 0 | Status: DISCONTINUED | OUTPATIENT
Start: 2025-07-05 | End: 2025-07-06

## 2025-07-05 RX ORDER — DESMOPRESSIN ACETATE 4 UG/ML
19 INJECTION INTRAVENOUS ONCE
Refills: 0 | Status: COMPLETED | OUTPATIENT
Start: 2025-07-05 | End: 2025-07-05

## 2025-07-05 RX ORDER — POLYETHYLENE GLYCOL 3350 17 G/17G
17 POWDER, FOR SOLUTION ORAL DAILY
Refills: 0 | Status: DISCONTINUED | OUTPATIENT
Start: 2025-07-05 | End: 2025-07-14

## 2025-07-05 RX ADMIN — Medication 4 MILLIGRAM(S): at 09:44

## 2025-07-05 RX ADMIN — Medication 100 MILLIEQUIVALENT(S): at 23:33

## 2025-07-05 RX ADMIN — Medication 100 MILLIEQUIVALENT(S): at 18:43

## 2025-07-05 RX ADMIN — Medication 25 GRAM(S): at 16:33

## 2025-07-05 RX ADMIN — Medication 100 MILLIEQUIVALENT(S): at 17:08

## 2025-07-05 RX ADMIN — LABETALOL HYDROCHLORIDE 10 MILLIGRAM(S): 200 TABLET, FILM COATED ORAL at 14:16

## 2025-07-05 RX ADMIN — LEVETIRACETAM 400 MILLIGRAM(S): 10 INJECTION, SOLUTION INTRAVENOUS at 09:44

## 2025-07-05 RX ADMIN — Medication 25 MG/HR: at 16:32

## 2025-07-05 RX ADMIN — DESMOPRESSIN ACETATE 219 MICROGRAM(S): 4 INJECTION INTRAVENOUS at 10:04

## 2025-07-05 NOTE — PROVIDER CONTACT NOTE (OTHER) - ASSESSMENT
Patient A&O x2 with vigorous stimulation. No drift on LUE. RUE not antigravity to commands. Patient withdraw to pain on RUE. No drift on LLE and patient has some effort on RLE.

## 2025-07-05 NOTE — H&P ADULT - ASSESSMENT
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   Plan:   - Coags, ARU   - Repeat CTH 4 hrs in ED, CTA H/N at same time  - ?NPO  - ?-140   - ?Admit to NSCU under Dr. shirley for hydrowatch/q1 neurochecks

## 2025-07-05 NOTE — H&P ADULT - HISTORY OF PRESENT ILLNESS
HPI: 84F on ASA81, PMHx HTN, HLD pt found down, no external signs of trauma @[facility] CTH w/Lt BG IPH, IVH , DDAVP + keppra given at [facility]. Plts wnl        Imaging:    Exam:    --Anticoagulation:    =====================  PAST MEDICAL HISTORY     PAST SURGICAL HISTORY     No Known Allergies      MEDICATIONS:  Antibiotics:    Neuro:    Other:      SOCIAL HISTORY:   Occupation:   Marital Status:     FAMILY HISTORY:      ROS: Negative except per HPI HPI: 84F on ASA81, PMHx HTN, HLD pt found down, no external signs of trauma @[facility] CTH w/Lt BG IPH, IVH , DDAVP + keppra given at [facility]. Plts wnl      Exam: Easily arousable, EOV, Ox1, PERRL, EOMI, Rt facial, RUE 3/5, LUE 5/5, BLE 5/5    --Anticoagulation:    =====================  PAST MEDICAL HISTORY     PAST SURGICAL HISTORY     No Known Allergies      MEDICATIONS:  Antibiotics:    Neuro:    Other:      SOCIAL HISTORY:   Occupation:   Marital Status:     FAMILY HISTORY:      ROS: Negative except per HPI

## 2025-07-05 NOTE — ED PROVIDER NOTE - CRITICAL CARE ATTENDING CONTRIBUTION TO CARE
Critical care billing:  Upon my evaluation, this patient had a high probability of imminent or life-threatening deterioration due to brain bleed, which required my direct attention, intervention, and personal management.  The patient has a  medical condition that impairs one or more vital organ systems.  Frequent personal assessment and adjustment of medical interventions was performed.    I have personally provided 30 minutes of critical care time exclusive of time spent on separately billable procedures. Time includes review of any laboratory data, radiology results, discussion with consultants, patient and/or family; monitoring for potential decompensation, as well as time spent retrieving data and reviewing the chart and documenting the visit. Interventions were performed as documented above.    84-year-old female with past medical history of hypertension, hyperlipidemia, hypothyroidism on baby aspirin presenting to emergency department as transfer from [place] for neurosurgical evaluation.  As per family patient was found down in shower this morning, had right sided weakness and concern for possible facial droop, altered mental status.  Was taken to [place] emergency department and stroke code was called.  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  Lab work grossly unremarkable.  As per daughter at bedside patient's mental status does seem to be slightly improving but not at baseline currently.  Still has residual right sided weakness.  Denies any recent fever, cough, headache, chest pain, shortness of breath, abdominal pain, nausea, vomiting, diarrhea, extremity edema, rash.  At outside hospital patient received desmopressin, 500 of Keppra, 4 Zofran.    GENERAL: No acute distress   HEENT: PERRLA, EOMI, mucous membranes moist   CARDIO: Regular rate and rhythm, no appreciable murmur   PULM: Lung sounds clear b/l. Normal work of breathing.   GI: Abdomen soft, non tender, no distended. No rebound or guarding.   MSK/SKIN: No obvious deformity, No midline cervical spinal ttp. No rash.   NEURO:   A&Ox2. CN II-XII grossly intact. Moving all four extremities. Decreased strength in RUE and RLE compared to left. Sensation intact and equal over face and b/l upper and lower extremities. Speech clear and fluent. Normal smile and facial symmetry. Answering questions appropriately.  Psych: Appropriate mood.    In the emergency department patient is hemodynamically stable, afebrile.  Patient alert and oriented x 2.  No obvious facial droop on exam.  Patient does have weakness in right upper and right lower extremity.  Pupils are equal round reactive to light and accommodation.  Patient is following commands.  Speaking in full sentences.  Daughter at bedside translating in Mongolian.  Rest of exam as noted above.  Neurosurgery consulted and at bedside evaluating patient.  Recommending admit to NICU, repeat CT scan with CTA of head and neck at 4 hours post original scan.  Keep n.p.o. for now.  Will admit to NICU after CT imaging performed.

## 2025-07-05 NOTE — CONSULT NOTE ADULT - SUBJECTIVE AND OBJECTIVE BOX
Neurology - Consult Note    Spectra: [phone] ([facility]), [phone] ([facility])    HPI: 84-year-old female with past medical history of hypertension, hyperlipidemia, hypothyroidism on baby aspirin presenting to emergency department as transfer from [place] due to CTH finding of 3 cm acute parenchymal hemorrhage in the left corona radiata and thalamus with intraventricular hemorrhage and bilateral lateral 3rd and 4th ventricles.  As per family patient was found down in shower this morning, had right sided weakness and concern for possible facial droop, altered mental status.  Was taken to [place] emergency department and stroke code was called.       Review of Systems:   CONSTITUTIONAL: No fevers or chills  EYES AND ENT: No visual changes or no throat pain   NECK: No pain or stiffness  RESPIRATORY: No shortness of breath  CARDIOVASCULAR: No chest pain or palpitations  GASTROINTESTINAL: No nausea or vomiting   GENITOURINARY: No dysuria  NEUROLOGICAL: +As stated in HPI above  SKIN: No itching, burning, rashes, or lesions   All other review of systems is negative unless indicated above.    Allergies:  No Known Allergies      PMHx/PSHx/Family Hx: As above, otherwise see below   No pertinent past medical history        Social Hx:  Never smoker; no current use of tobacco, alcohol, or illicit drugs    Medications:  MEDICATIONS  (STANDING):  atorvastatin 10 milliGRAM(s) Oral at bedtime  calcium carbonate    500 mG (Tums) Chewable 1 Tablet(s) Chew daily  chlorhexidine 4% Liquid 1 Application(s) Topical daily  cholecalciferol 2000 Unit(s) Oral daily  levETIRAcetam  IVPB 500 milliGRAM(s) IV Intermittent every 12 hours  levothyroxine 88 MICROGram(s) Oral daily  metoprolol tartrate 25 milliGRAM(s) Oral two times a day  niCARdipine Infusion 5 mG/Hr (25 mL/Hr) IV Continuous <Continuous>  polyethylene glycol 3350 17 Gram(s) Oral daily  sodium chloride 0.9%. 500 milliLiter(s) (50 mL/Hr) IV Continuous <Continuous>    MEDICATIONS  (PRN):  acetaminophen     Tablet .. 650 milliGRAM(s) Oral every 6 hours PRN Temp greater or equal to 38.5C (101.3F), Mild Pain (1 - 3)  senna 2 Tablet(s) Oral at bedtime PRN Constipation      Vitals:  T(C): 36.6 (07-05-25 @ 11:10), Max: 36.6 (07-05-25 @ 11:10)  HR: 100 (07-05-25 @ 14:30) (62 - 100)  BP: 129/74 (07-05-25 @ 14:30) (129/74 - 184/78)  RR: 20 (07-05-25 @ 14:30) (16 - 20)  SpO2: 96% (07-05-25 @ 14:30) (96% - 99%)    Physical Examination:  General - non-toxic appearing, in no acute distress  Cardiovascular - peripheral pulses palpable, no edema  Respiratory - breathing comfortably with no increased work of breathing    Neurologic Exam:  Mental status - Awake, Alert, Oriented to person, place, but not time. Minimal spontaneous speech output, repetition and naming intact. Follows some but not all simple commands. Able to attend but requires repeat prompting    Cranial nerves - PERRLA (4mm -> 3mm b/l), VFF, EOMI, face sensation (V1-V3) intact b/l, facial strength intact without asymmetry b/l    Motor - Normal bulk and tone throughout. Able to lift LUE antigravity with no drift, strength testing limited by poor effort. RUE moves in plane of bed but unable to lift antigravity, with some dystonic/dyskinetic movements noted. LLE lifts antigravity with no drift, RLE lifts antigravity with some drift to bed.    Sensation - Light touch intact throughout    Reflexes:                                             Right             Left  Triceps                     2+                2+  Biceps                      2+                2+  Brachioradialis          2+                2+  Patellar                    2+                 2+  Achilles                    2+                 2+  Plantar                   Down            Down    Coordination - Finger to Nose intact in LUE, unable to test RUE due to weakness.     Gait and station - Not tested for patient safety        Radiology:     Neurology - Consult Note    Spectra: [phone] ([facility]), [phone] ([facility])    HPI: 85 yo female with PMH of HTN, HLD, hypothyroidism on aspirin presenting to emergency department as transfer from [place] due to CTH finding of 3 cm acute parenchymal hemorrhage in the left corona radiata and thalamus with intraventricular hemorrhage and bilateral lateral 3rd and 4th ventricles. As per family patient was found down in shower this morning, had right sided weakness and concern for possible facial droop, altered mental status. Currently patient is unable to provide further history due to AMS, history obtained from charts.       Review of Systems:   NEUROLOGICAL: +As stated in HPI above  All other review of systems is negative unless indicated above.    Allergies:  No Known Allergies      PMHx/PSHx/Family Hx: As above, otherwise see below   No pertinent past medical history        Social Hx:  Never smoker; no current use of tobacco, alcohol, or illicit drugs    Medications:  MEDICATIONS  (STANDING):  atorvastatin 10 milliGRAM(s) Oral at bedtime  calcium carbonate    500 mG (Tums) Chewable 1 Tablet(s) Chew daily  chlorhexidine 4% Liquid 1 Application(s) Topical daily  cholecalciferol 2000 Unit(s) Oral daily  levETIRAcetam  IVPB 500 milliGRAM(s) IV Intermittent every 12 hours  levothyroxine 88 MICROGram(s) Oral daily  metoprolol tartrate 25 milliGRAM(s) Oral two times a day  niCARdipine Infusion 5 mG/Hr (25 mL/Hr) IV Continuous <Continuous>  polyethylene glycol 3350 17 Gram(s) Oral daily  sodium chloride 0.9%. 500 milliLiter(s) (50 mL/Hr) IV Continuous <Continuous>    MEDICATIONS  (PRN):  acetaminophen     Tablet .. 650 milliGRAM(s) Oral every 6 hours PRN Temp greater or equal to 38.5C (101.3F), Mild Pain (1 - 3)  senna 2 Tablet(s) Oral at bedtime PRN Constipation      Vitals:  T(C): 36.6 (07-05-25 @ 11:10), Max: 36.6 (07-05-25 @ 11:10)  HR: 100 (07-05-25 @ 14:30) (62 - 100)  BP: 129/74 (07-05-25 @ 14:30) (129/74 - 184/78)  RR: 20 (07-05-25 @ 14:30) (16 - 20)  SpO2: 96% (07-05-25 @ 14:30) (96% - 99%)    Physical Examination:  General - non-toxic appearing, in no acute distress  Cardiovascular - peripheral pulses palpable, no edema  Respiratory - breathing comfortably with no increased work of breathing    Neurologic Exam:  Mental status - Awake, Alert, Oriented to person, place, but not time. Minimal spontaneous speech output, repetition and naming intact. Follows some but not all simple commands. Able to attend but requires repeat prompting    Cranial nerves - PERRLA (4mm -> 3mm b/l), VFF, EOMI, face sensation (V1-V3) intact b/l, facial strength intact without asymmetry b/l    Motor - Normal bulk and tone throughout. Able to lift LUE antigravity with no drift, strength testing limited by poor effort. RUE moves in plane of bed but unable to lift antigravity, with some dystonic/dyskinetic movements noted. LLE lifts antigravity with no drift, RLE lifts antigravity with some drift to bed.    Sensation - Light touch intact throughout    Reflexes:                                             Right             Left  Triceps                     2+                2+  Biceps                      2+                2+  Brachioradialis          2+                2+  Patellar                    2+                 2+  Achilles                    2+                 2+    Coordination - Finger to Nose intact in LUE, unable to test RUE due to weakness.     Gait and station - Not tested for patient safety        Radiology:     Neurology - Consult Note    Spectra: [phone] ([facility]), [phone] ([facility])    HPI: 85 yo female with PMH of HTN, HLD, hypothyroidism on aspirin presenting to emergency department as transfer from [place] due to CTH finding of 3 cm acute parenchymal hemorrhage in the left corona radiata and thalamus with intraventricular hemorrhage and bilateral lateral 3rd and 4th ventricles. As per family patient was found down in shower this morning, had right sided weakness and concern for possible facial droop, altered mental status. Currently patient is unable to provide further history due to AMS, history obtained from charts.       Review of Systems:   NEUROLOGICAL: +As stated in HPI above  All other review of systems is negative unless indicated above.    Allergies:  No Known Allergies      PMHx/PSHx/Family Hx: As above, otherwise see below   No pertinent past medical history        Social Hx:  Never smoker; no current use of tobacco, alcohol, or illicit drugs    Medications:  MEDICATIONS  (STANDING):  atorvastatin 10 milliGRAM(s) Oral at bedtime  calcium carbonate    500 mG (Tums) Chewable 1 Tablet(s) Chew daily  chlorhexidine 4% Liquid 1 Application(s) Topical daily  cholecalciferol 2000 Unit(s) Oral daily  levETIRAcetam  IVPB 500 milliGRAM(s) IV Intermittent every 12 hours  levothyroxine 88 MICROGram(s) Oral daily  metoprolol tartrate 25 milliGRAM(s) Oral two times a day  niCARdipine Infusion 5 mG/Hr (25 mL/Hr) IV Continuous <Continuous>  polyethylene glycol 3350 17 Gram(s) Oral daily  sodium chloride 0.9%. 500 milliLiter(s) (50 mL/Hr) IV Continuous <Continuous>    MEDICATIONS  (PRN):  acetaminophen     Tablet .. 650 milliGRAM(s) Oral every 6 hours PRN Temp greater or equal to 38.5C (101.3F), Mild Pain (1 - 3)  senna 2 Tablet(s) Oral at bedtime PRN Constipation      Vitals:  T(C): 36.6 (07-05-25 @ 11:10), Max: 36.6 (07-05-25 @ 11:10)  HR: 100 (07-05-25 @ 14:30) (62 - 100)  BP: 129/74 (07-05-25 @ 14:30) (129/74 - 184/78)  RR: 20 (07-05-25 @ 14:30) (16 - 20)  SpO2: 96% (07-05-25 @ 14:30) (96% - 99%)    Physical Examination:  General - non-toxic appearing, in no acute distress  Cardiovascular - peripheral pulses palpable, no edema  Respiratory - breathing comfortably with no increased work of breathing    Neurologic Exam:  Mental status - Awake, Alert, Oriented to person, place, but not time. Minimal spontaneous speech output, repetition and naming intact. Follows some but not all simple commands. Able to attend but requires repeat prompting    Cranial nerves - PERRLA (4mm -> 3mm b/l), VFF, EOMI, face sensation (V1-V3) intact b/l, facial strength intact without asymmetry b/l    Motor - Normal bulk and tone throughout. Able to lift LUE antigravity with no drift, strength testing limited by poor effort. RUE moves in plane of bed but unable to lift antigravity, with some dystonic/dyskinetic movements noted. LLE lifts antigravity with no drift, RLE lifts antigravity with some drift to bed.    Sensation - Light touch intact throughout    Reflexes:                                             Right             Left  Triceps                     2+                2+  Biceps                      2+                2+  Brachioradialis          2+                2+  Patellar                    2+                 2+  Achilles                    2+                 2+    Coordination - Finger to Nose intact in LUE, unable to test RUE due to weakness.     Gait and station - Not tested for patient safety        Radiology:    CT HEAD:    No new or increasing intracranial hemorrhage since this morning's scan at   outside hospital (separate record number, accession #[id number]).    Left thalamic and corona radiata hemorrhage with intraventricular   extension. Stable ventricular size but close follow-up advised.      CTA BRAIN:  No arterial occlusion or significant stenosis. No aneurysm or other   arterial source for the left parenchymal hemorrhage.    CTA NECK:  No arterial steno-occlusive disease or evidence of dissection.

## 2025-07-05 NOTE — ED ADULT NURSE NOTE - CHIEF COMPLAINT QUOTE
BIBEMS s/p fall. per EMS pt was found with rt sided facial droop, rt sided weakness, aphasia. symptoms persistent in ED, drooling also noted. code stroke called at 0903 via [facility] prenotification. . LKW [phone]. pt sent straight to CT from triage.

## 2025-07-05 NOTE — PROGRESS NOTE ADULT - ASSESSMENT
ASSESSMENT/PLAN: 84F on ASA s/p DDAVP s/p fall with Left basal ganglia hemorrhage w/ IVH    NEURO:  Neuro/vitals q1  Interval CTH stable hemorrhage, CTA head and neck negative  Rpt CTH in AM  Hydrowatch  MRI at somepoint  Stroke following  Seizure ppx: Keppra 500mg BID x 7 days  Pain: tylenol PRN   Activity: [x] OOB as tolerated [] Bedrest [x] PT [x] OT [] PMNR    PULM:  Incentive spirometry, mobilize as tolerated  SpO2 >92%, on room air    CV:  -140mmHg  Cardene gtt   Lipid panel pending  TTE pending  Hold Aspirin, ARU pending   continue home atorvastatin 10mg at bedtime      RENAL:  IVF until good PO intake      GI:  Diet: Dysphagia screen and then advance diet as tolerated  senna and miralax for bowel regimen  Last Bowel Movement: PTA  GI prophylaxis [x] not indicated [] PPI [] other:      ENDO:   Goal euglycemia (-180)  continue home synthroid 88mcg daily  a1c and thyroid panel pending    HEME/ONC:  VTE prophylaxis: [x] SCDs [] hold chemoprophylaxis due to: bleed day 0 of hemorrhage  LED-p    ID:  afebrile    MISC:    SOCIAL/FAMILY:  [x] awaiting [] updated at bedside [] family meeting    CODE STATUS:  [x] Full Code [] DNR [] DNI [] Palliative/Comfort Care    DISPOSITION:  [x] ICU [] Stroke Unit [] Floor [] EMU [] RCU [] PCU    [x] Patient is at high risk of neurologic deterioration/death due to:       Contact: [phone] ASSESSMENT/PLAN: 84F on ASA s/p DDAVP s/p fall with Left basal ganglia hemorrhage w/ IVH    NEURO:  Neuro/vitals q1  Interval CTH stable hemorrhage, CTA head and neck negative  Rpt CTH in AM  Hydrowatch  MRI at somepoint  Stroke following  Seizure ppx: Keppra 500mg BID x 7 days  Pain: tylenol PRN   Activity: [x] OOB as tolerated [] Bedrest [x] PT [x] OT [] PMNR    PULM:  Incentive spirometry, mobilize as tolerated  SpO2 >92%, on room air    CV:  -140mmHg  Cardene gtt   Lipid panel pending  TTE pending  Hold Aspirin, ARU pending   continue home atorvastatin 10mg at bedtime  continue metoprolol 25mg BID      RENAL:  IVF until good PO intake  Hold home lasix      GI:  Diet: Dysphagia screen and then advance diet as tolerated  senna and miralax for bowel regimen  Last Bowel Movement: PTA  GI prophylaxis [x] not indicated [] PPI [] other:      ENDO:   Goal euglycemia (-180)  continue home synthroid 88mcg daily  a1c and thyroid panel pending    HEME/ONC:  VTE prophylaxis: [x] SCDs [] hold chemoprophylaxis due to: bleed day 0 of hemorrhage  LED-p    ID:  afebrile    MISC:    SOCIAL/FAMILY:  [x] awaiting [] updated at bedside [] family meeting    CODE STATUS:  [x] Full Code [] DNR [] DNI [] Palliative/Comfort Care    DISPOSITION:  [x] ICU [] Stroke Unit [] Floor [] EMU [] RCU [] PCU    [x] Patient is at high risk of neurologic deterioration/death due to:       Contact: [phone] ASSESSMENT/PLAN: 84F on ASA s/p DDAVP s/p fall with Left basal ganglia hemorrhage w/ IVH    NEURO:  Neuro/vitals q1  Interval CTH stable hemorrhage, CTA head and neck negative  Rpt CTH in AM  Hydrowatch  MRI at somepoint  Stroke following  Seizure ppx: Keppra 500mg BID x 7 days  Pain: tylenol PRN   Activity: [x] OOB as tolerated [] Bedrest [x] PT [x] OT [] PMNR    PULM:  Incentive spirometry, mobilize as tolerated  SpO2 >92%, on room air    CV:  -140mmHg  Cardene gtt   Lipid panel pending  TTE pending  Hold Aspirin, ARU pending   continue home atorvastatin 10mg at bedtime  continue metoprolol 25mg BID      RENAL:  IVF until good PO intake  Hold home lasix      GI:  Diet: Dysphagia screen and then advance diet as tolerated  senna and miralax for bowel regimen  Last Bowel Movement: PTA  GI prophylaxis [x] not indicated [] PPI [] other:      ENDO:   Goal euglycemia (-180)  continue home synthroid 88mcg daily  a1c and thyroid panel pending    HEME/ONC:  VTE prophylaxis: [x] SCDs [] hold chemoprophylaxis due to: bleed day 0 of hemorrhage  LED-p    ID:  afebrile    MISC:    SOCIAL/FAMILY:  [x] awaiting [] updated at bedside [] family meeting    CODE STATUS:  [x] Full Code [] DNR [] DNI [] Palliative/Comfort Care    DISPOSITION:  [x] ICU [] Stroke Unit [] Floor [] EMU [] RCU [] PCU    [x] Patient is at high risk of neurologic deterioration/death due to: thalamic hemorrhage, intraventricular hemorrhage       Contact: [phone]

## 2025-07-05 NOTE — ED ADULT NURSE NOTE - OBJECTIVE STATEMENT
BIBEMS s/p fall. per EMS pt was found with right sided weakness symptoms persistent in ED, drooling also noted. code stroke called at 0903 via [facility] prenotification. . LKW [phone].

## 2025-07-05 NOTE — H&P ADULT - HISTORY OF PRESENT ILLNESS
HPI: 84F on ASA81, PMHx HTN, HLD pt found down, no external signs of trauma @[facility] CTH w/Lt BG IPH, IVH , DDAVP + keppra given at [facility]. Plts wnl    Exam: Easily arousable, EOV, Ox1, PERRL, EOMI, Rt facial, RUE 3/5, LUE 5/5, BLE 5/5       --Anticoagulation:    =====================  PAST MEDICAL HISTORY   No pertinent past medical history      PAST SURGICAL HISTORY     No Known Allergies      MEDICATIONS:  Antibiotics:    Neuro:    Other:      SOCIAL HISTORY:   Occupation:   Marital Status:     FAMILY HISTORY:      ROS: Negative except per HPI    LABS:

## 2025-07-05 NOTE — ED ADULT NURSE NOTE - NSFALLRISKINTERV_ED_ALL_ED

## 2025-07-05 NOTE — ED ADULT NURSE NOTE - TEMPLATE LIST FOR HEAD TO TOE ASSESSMENT
Progress Notes by Hermila Delaney PT at 4/3/2017 10:30 AM     Author: Hermila Delaney PT Service: -- Author Type: Physical Therapist    Filed: 4/3/2017  7:07 PM Encounter Date: 4/3/2017 Status: Signed    : Hermila Delaney PT (Physical Therapist)       [facility]   [place] Follow up    Patient Name: Oma Gross  Date of evaluation: 4/3/2017  Referral Diagnosis: [unfilled]  Referring provider: Shahana Gomez CNP  Visits: 2/12  Visit Diagnosis:     ICD-10-CM    1. Lumbar stenosis with neurogenic claudication M48.06    2. Lumbar radiculopathy M54.16    3. Decreased ROM of lumbar spine M25.60    4. Weakness of both hips M62.81        Assessment:    Pt presents S/p L2-5 decompressive laminectomies and neurogenic claudication.  L sided hip weakness >R and limited lumbar ROM in all directions. Pt continues to demonstrate forward flexed positioning with ambulation this day.  Pt notes soreness along incision and n/t with prolonged sitting on edge of plinth. Pt would benefit from sklled physical therapy in order to address pt's deficits to decrease pain and radicular symptoms for walking and standing for extended durations without pain.   The POC is dynamic and will be modified on an ongoing basis.  Prognosis to achieve goals is  good   Pt. is appropriate for skilled PT intervention as outlined in the Plan of Care (POC).  Pt. is a good candidate for skilled PT services to improve pain levels and function.    Goals:  Pt. will demonstrate/verbalize independence in self-management of condition in : 6 weeks  Pt. will have improved quality of sleep: waking less times/night;in 6 weeks  Pt. will be able to walk : 30 minutes;on even surfaces;with less pain;in 6 weeks  Patient will stand : 30 minutes;with less pain;for home chores;in 6 weeks  Patient will transfer: sit/stand;for in/out of chair;with less pain;in 6 weeks  No Data Recorded    Patient's expectations/goals are  realistic.    Barriers to Learning or Achieving Goals:  Chronicity of the problem.  Co-morbidities or other medical factors.  See below       Plan / Patient Instructions:        Plan for next visit: NuStep, follow nerve pathyways for MFR, NOT PRONE,      Subjective:   Exercises are going well I have been achey but not as sharp of the pain.     Functional limitations are described as occurring with:    - walking >10 min   - standing >5 min   - in and out of a chair   - bending   - amb over uneven surfaces   - lifting   - dressing   - squatting   - housework       Objective:        Treatment Today     TREATMENT MINUTES COMMENTS   Evaluation     Self-care/ Home management 10 Discussed warning signs for cauda equina symptoms as pt is noting B ADD n/t intermittently. Discussed physiology of pt's condition and what pt should do for continued reduced symptoms   Manual therapy 15  Sidelying MFR around L glute med with gentle pressure   Neuromuscular Re-education     Therapeutic Activity     Therapeutic Exercises 14 6 minute warm up on Nu-step with level 3 resistance and VC to use heels and maintain knee spacing for alignment   Added lat stretching and hip ABD stretching for L hip this day.      Gait training     Modality__________________                Total 39    Blank areas are intentional and mean the treatment did not include these items.     HEP:         PT Evaluation Code: (Please list factors)  Patient History/Comorbidities: stenosis, cauda equina compression  Examination: LE, gait, ROM, strength  Clinical Presentation: unstable   Clinical Decision Making: moderate    Patient History/  Comorbidities Examination  (body structures and functions, activity limitations, and/or participation restrictions) Clinical Presentation Clinical Decision Making (Complexity)   No documented Comorbidities or personal factors 1-2 Elements Stable and/or uncomplicated Low   1-2 documented comorbidities or personal factor 3 Elements  Evolving clinical presentation with changing characteristics Moderate   3-4 documented comorbidities or personal factors 4 or more Unstable and unpredictable High                  Hermila Delaney  4/3/2017  8:54 AM                     General

## 2025-07-05 NOTE — PROGRESS NOTE ADULT - ATTENDING COMMENTS
84yo woman adm for L thalamic hemorrhage with IVH, ICH score 2  on exam, alert, oriented to self, place with choices, and season but not year or month (baseline per family at bedside), following commands, EOMI, FS , RUE drift, R  3/5 otherwise at least 4/5, RLE no drift at least 4/5, L side full strength  on ASA, s/p DDAVP, ARU still therapeutic, will give 1uplt  hydro watch, Q1 checks  repeat CTH in am unless exam change  stroke neurology consult  stroke core measures  tylenol prn for pain control  no sedation  -160  HTNsive cont home dose losartan 100mg, switch metoprolol to carvedilol 25mg Q12  cont lasix 20mg daily   will call outpt cardiologist, affiliated with [facility], aspiration precaution  O2sat>92%  replete hypokalemia  NPO except for meds  bowel regimen to start tomorrow  hypothyroid, cont home dose synthroid  maintain euglycemia  hold chemoppx ICH/IVH  two daughters at bedside updated, plan discussed 84yo woman adm for L thalamic hemorrhage with IVH, ICH score 2  CTA unremarkable  likely HTNsive vs. amyloid   on exam, alert, oriented to self, place with choices, and season but not year or month (baseline per family at bedside), following commands, EOMI, FS , RUE drift, R  3/5 otherwise at least 4/5, RLE no drift at least 4/5, L side full strength  on ASA, s/p DDAVP, ARU still therapeutic, will give 1uplt  hydro watch, Q1 checks  repeat CTH in am unless exam change  MRI wwo  stroke neurology consult  stroke core measures  tylenol prn for pain control  no sedation  -160  HTNsive cont home dose losartan 100mg, switch metoprolol to carvedilol 25mg Q12  cont lasix 20mg daily   will call outpt cardiologist, affiliated with [facility], aspiration precaution  O2sat>92%  replete hypokalemia  NPO except for meds  bowel regimen to start tomorrow  hypothyroid, cont home dose synthroid  maintain euglycemia  hold chemoppx ICH/IVH  two daughters at bedside updated, plan discussed

## 2025-07-05 NOTE — PROGRESS NOTE ADULT - ASSESSMENT
ASSESSMENT/PLAN: 84F on ASA s/p DDAVP s/p fall with Left basal ganglia hemorrhage w/ IVH    NEURO:  Neuro/vitals q1  Interval CTH stable hemorrhage, CTA head and neck negative  Rpt CTH in AM  Hydrowatch  MRI ordered  Stroke following  Seizure ppx: Keppra 500mg BID x 7 days (End date: 7/11/2025)  Pain: tylenol PRN   Activity: [x] OOB as tolerated [] Bedrest [x] PT [x] OT [] PMNR    PULM:  Incentive spirometry, mobilize as tolerated  SpO2 >92%, on room air    CV:  -140mmHg  Cardene gtt   Lipid panel pending  TTE pending  Hold Aspirin, ARU pending   continue home atorvastatin 10mg at bedtime  c/w Coreg 25 BID stat dose now  c/w Losartan 100 daily    RENAL:  IVF until good PO intake  Hold home lasix    GI:  Diet: Dysphagia screen and then advance diet as tolerated  senna and miralax for bowel regimen  Last Bowel Movement: PTA  GI prophylaxis [x] not indicated [] PPI [] other:    ENDO:   Goal euglycemia (-180)  continue home synthroid 88mcg daily  a1c and thyroid panel pending    HEME/ONC:  VTE prophylaxis: [x] SCDs [] hold chemoprophylaxis due to: bleed day 0 of hemorrhage  LED-p    ID:  afebrile    MISC:    SOCIAL/FAMILY:  [x] awaiting [] updated at bedside [] family meeting    CODE STATUS:  [x] Full Code [] DNR [] DNI [] Palliative/Comfort Care    DISPOSITION:  [x] ICU [] Stroke Unit [] Floor [] EMU [] RCU [] PCU    [x] Patient is at high risk of neurologic deterioration/death due to: thalamic hemorrhage, intraventricular hemorrhage       Contact: [phone] ASSESSMENT/PLAN: 84F on ASA s/p DDAVP s/p fall with Left basal ganglia hemorrhage w/ IVH    NEURO:  Neuro/vitals q1  Interval CTH stable hemorrhage, CTA head and neck negative  Rpt CTH in AM  Hydrowatch  MRI ordered  Stroke following  Seizure ppx: Keppra 500mg BID x 7 days (End date: 7/11/2025)  Pain: tylenol PRN   Activity: [x] OOB as tolerated [] Bedrest [x] PT [x] OT [] PMNR    PULM:  Incentive spirometry, mobilize as tolerated  SpO2 >92%, on room air    CV:  -140mmHg  Cardene gtt   Lipid panel pending  TTE pending  Hold Aspirin, ARU pending   continue home atorvastatin 10mg at bedtime  c/w Coreg 25 BID  c/w Losartan 100 daily    RENAL:  NS 65cc/h until good PO intake  Hold home Lasix    GI:  Diet: Dysphagia screen and then advance diet as tolerated  senna and miralax for bowel regimen  Last Bowel Movement: PTA  GI prophylaxis [x] not indicated [] PPI [] other:    ENDO:   Goal euglycemia (-180)  continue home synthroid 88mcg daily  a1c and thyroid panel pending    HEME/ONC:  VTE prophylaxis: [x] SCDs [x] hold chemoprophylaxis due to: bleed day 0 of hemorrhage  LED-p    ID:  afebrile    MISC:    SOCIAL/FAMILY:  [ ] awaiting [x] updated at bedside [] family meeting    CODE STATUS:  [x] Full Code [] DNR [] DNI [] Palliative/Comfort Care    DISPOSITION:  [x] ICU [] Stroke Unit [] Floor [] EMU [] RCU [] PCU    [x] Patient is at high risk of neurologic deterioration/death due to: thalamic hemorrhage, intraventricular hemorrhage       Contact: [phone]

## 2025-07-05 NOTE — CONSULT NOTE ADULT - ASSESSMENT
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    Recommendations:  [] MRI Brain w/wo contrast  [] BP goal < 140 systolic    Note and plan not finalized until attending attestation and signature.  83 yo female with PMH of HTN, HLD, hypothyroidism on aspirin presenting to emergency department as transfer from [place] due to CTH finding of 3 cm acute parenchymal hemorrhage in the left corona radiata and thalamus with intraventricular hemorrhage and bilateral lateral 3rd and 4th ventricles. As per family patient was found down in shower this morning, had right sided weakness and concern for possible facial droop, altered mental status. Currently patient is unable to provide further history due to AMS, history obtained from charts.    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    Recommendations:  [] MRI Brain w/wo contrast  [] BP goal < 140 systolic  [] Stroke neurochecks Q2  [] PT/OT eval    Note and plan not finalized until attending attestation and signature.  85 yo female with PMH of HTN, HLD, hypothyroidism on aspirin presenting to emergency department as transfer from [place] due to CTH finding of 3 cm acute parenchymal hemorrhage in the left corona radiata and thalamus with intraventricular hemorrhage and bilateral lateral 3rd and 4th ventricles. As per family patient was found down in shower this morning, had right sided weakness and concern for possible facial droop, altered mental status. Currently patient is unable to provide further history due to AMS, history obtained from charts.    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    Recommendations:  [] MRI Brain w/wo contrast  [] BP goal < 140 systolic  [] Stroke neurochecks Q2  [] PT/OT eval    Note and plan not finalized until attending attestation and signature.  85 yo female with PMH of HTN, HLD, hypothyroidism on aspirin presenting to emergency department as transfer from [place] due to CTH finding of 3 cm acute parenchymal hemorrhage in the left corona radiata and thalamus with intraventricular hemorrhage and bilateral lateral 3rd and 4th ventricles. As per family patient was found down in shower this morning, had right sided weakness and concern for possible facial droop, altered mental status. Currently patient is unable to provide further history due to AMS, history obtained from charts.    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    Recommendations:  [] MRI Brain w/wo contrast  [] BP goal < 140 systolic  [] Stroke neurochecks Q2  [] PT/OT eval

## 2025-07-05 NOTE — PROGRESS NOTE ADULT - SUBJECTIVE AND OBJECTIVE BOX
HPI:  84F on ASA81, PMHx HTN, HLD, and hypothyroid. Transfer from  after patient was found down, no external signs of trauma. OSH CTH w/Lt BG IPH, IVH. At  given DDAVP + keppra. Transferred to [facility] for further neurosurgery     Admission Scores  GCS: 14 ICH: 2    HOSPITAL COURSE:    Allergies    No Known Allergies    Intolerances    REVIEW OF SYSTEMS: [ ] Unable to Assess due to neurologic exam   [ x] All ROS addressed below are non-contributory, except:  Neuro: [ ] Headache [ ] Back pain [ ] Numbness [x] Weakness [ ] Ataxia [ ] Dizziness [ ] Aphasia [ ] Dysarthria [ ] Visual disturbance  Resp: [ ] Shortness of breath/dyspnea, [ ] Orthopnea [ ] Cough  CV: [ ] Chest pain [ ] Palpitation [ ] Lightheadedness [ ] Syncope  Renal: [ ] Thirst [ ] Edema  GI: [ ] Nausea [ ] Emesis [ ] Abdominal pain [ ] Constipation [ ] Diarrhea  Hem: [ ] Hematemesis [ ] bright red blood per rectum  ID: [ ] Fever [ ] Chills [ ] Dysuria  ENT: [ ] Rhinorrhea      DEVICES:   [ ] Restraints [ ] ET tube [ ] central line [ ] arterial line [ ] tamez [ ] NGT/OGT [ ] EVD [ ] LD [ ] DEVON/HMV [ ] Trach [ ] PEG [ ] Chest Tube     EXAMINATION:  PHYSICAL EXAM:    Constitutional: No Acute Distress   Neurological: Easily arousable, EOV, oriented to self and place, following commands, PERRL, EOMI, No Gaze Preference, Right facial, dysarthric, Ride side neglect, RUE 3/5, LUE 5/5, BLE 5/5  Pulmonary: No rales, No rhonchi, No wheezes   Cardiovascular: Regular rate and rhythm   Gastrointestinal: Soft, Non-tender, Non-distended   Extremities: No calf tenderness     -----------------------------------------------------------------------------------------------------  ICU Vital Signs Last 24 Hrs  T(C): 36.6 (05 Jul 2025 11:10), Max: 36.6 (05 Jul 2025 11:10)  T(F): 97.9 (05 Jul 2025 11:10), Max: 97.9 (05 Jul 2025 11:10)  HR: 100 (05 Jul 2025 18:30) (62 - 102)  BP: 123/58 (05 Jul 2025 18:30) (114/55 - 184/78)  BP(mean): 83 (05 Jul 2025 18:30) (79 - 104)  ABP: --  ABP(mean): --  RR: 20 (05 Jul 2025 18:30) (16 - 20)  SpO2: 95% (05 Jul 2025 18:30) (94% - 100%)    O2 Parameters below as of 05 Jul 2025 14:30  Patient On (Oxygen Delivery Method): room air    I&O's Summary    05 Jul 2025 07:01  -  05 Jul 2025 19:50  --------------------------------------------------------  IN: 0 mL / OUT: 900 mL / NET: -900 mL        MEDICATIONS  (STANDING):  atorvastatin 10 milliGRAM(s) Oral at bedtime  calcium carbonate    500 mG (Tums) Chewable 1 Tablet(s) Chew daily  carvedilol 25 milliGRAM(s) Oral every 12 hours  chlorhexidine 4% Liquid 1 Application(s) Topical daily  cholecalciferol 2000 Unit(s) Oral daily  furosemide    Tablet 20 milliGRAM(s) Oral daily  levothyroxine 88 MICROGram(s) Oral daily  losartan 100 milliGRAM(s) Oral daily  niCARdipine Infusion 5 mG/Hr (25 mL/Hr) IV Continuous <Continuous>  polyethylene glycol 3350 17 Gram(s) Oral daily  potassium chloride  10 mEq/100 mL IVPB 10 milliEquivalent(s) IV Intermittent every 1 hour  sodium chloride 0.9%. 500 milliLiter(s) (50 mL/Hr) IV Continuous <Continuous>      RESPIRATORY:      IMAGING:   Recent imaging studies were reviewed.    LAB RESULTS:                          12.3   10.35 )-----------( 138      ( 05 Jul 2025 15:31 )             37.2       PT/INR - ( 05 Jul 2025 15:32 )   PT: 12.4 sec;   INR: 1.08 ratio         PTT - ( 05 Jul 2025 15:32 )  PTT:29.4 sec    07-05    137  |  99  |  20  ----------------------------<  115[H]  3.6   |  21[L]  |  0.67    Ca    8.9      05 Jul 2025 15:31  Phos  3.2     07-05  Mg     1.7     07-05    TPro  7.3  /  Alb  4.2  /  TBili  0.6  /  DBili  x   /  AST  19  /  ALT  12  /  AlkPhos  66  07-05

## 2025-07-05 NOTE — CONSULT NOTE ADULT - CRITICAL CARE ATTENDING COMMENT
Impression: Left thalamic intracerebral hemorrhage with intraventricular extension  ICH score–3  On neurological exam exam focal deficit includes right hemiparesis right arm strength 4/5    Plan:  - Avoid any antiplatelets or therapeutic anticoagulation  - BP goal - allow permissive HTN SBP up to 160, followed by gradual normotension   - SCDs for DVT prophylaxis for now. Would consider pharmacological DVT prophylaxis at 48 hours after establishing stability of the ICH with repeat brain imaging   - MRI brain with and without contrast/MRA head and neck with and without contrast  - HbA1C and LDL  - TTE to look for LVH concerning for long standing HTN  - Agree with aggressive vascular risk factors modifications     Above mentioned plan was discussed with patient and available family members at bedside. All the questions were answered and concerns were addressed.

## 2025-07-05 NOTE — ED PROVIDER NOTE - OBJECTIVE STATEMENT
BIBEMS s/p fall. per EMS pt was found with rt sided facial droop, rt sided weakness, aphasia. symptoms persistent in ED, drooling also noted. code stroke called at [phone] via [facility] prenotification. . LKW [id number]. pt sent straight to CT from triage.  fall  84 year old female brought in by  call my family because the patient was found in the shower on the ground noted facial droop unable to speak also notified right sided weakness stroke code was activated patient was evaluated in the triage rush to the ct

## 2025-07-05 NOTE — ED ADULT NURSE NOTE - NS_NURSE_BED_LOCATION_ED_ALL_ED
Quality 226: Preventive Care And Screening: Tobacco Use: Screening And Cessation Intervention: Patient screened for tobacco use and is an ex/non-smoker Detail Level: Detailed Quality 130: Documentation Of Current Medications In The Medical Record: Current Medications Documented Quality 431: Preventive Care And Screening: Unhealthy Alcohol Use - Screening: Patient not identified as an unhealthy alcohol user when screened for unhealthy alcohol use using a systematic screening method Quality 110: Preventive Care And Screening: Influenza Immunization: Influenza Immunization previously received during influenza season Quality 111:Pneumonia Vaccination Status For Older Adults: Pneumococcal Vaccination Previously Received Yes (specify)

## 2025-07-05 NOTE — PROGRESS NOTE ADULT - ATTENDING COMMENTS
Patient more lethargic, otherwise exam the same.   Will obtain head CT.   No urine obtained today. Will obtain urine sample to rule out UTI as a source of mental status change. Patient more lethargic, otherwise exam the same. Repeat head CT appears the same.     No urine obtained today. Will obtain urine sample to rule out UTI as a source of mental status change.    If UA unremarkable, she may just have ICU delirium.     She is 84 with marked cerebral atrophy throughout, making her at risk of ICU delirium. Given the amount of space she has in her ventricles due to her atrophy, I have low suspicion of her developing hydrocephalus. I would prioritize blood pressure control, delirium precautions and sleep in her management. Would keep her q1h vitals, but space out her neuro checks q4h overnight.

## 2025-07-05 NOTE — ED PROVIDER NOTE - PHYSICAL EXAMINATION
General:     NAD, well-nourished, well-appearing  Head:     NC/AT, EOMI, oral mucosa moist  Neck:     trachea midline  Lungs:     CTA b/l, no w/r/r  CVS:     S1S2, RRR, no m/g/r  Abd:     +BS, s/nt/nd, no organomegaly  Ext:    2+ radial and pedal pulses, no c/c/e  Neuro: AA aphasia R sided weakness

## 2025-07-05 NOTE — PROGRESS NOTE ADULT - SUBJECTIVE AND OBJECTIVE BOX
HPI:  84F on ASA81, PMHx HTN, HLD, and hypothyroid. Transfer from  after patient was found down, no external signs of trauma. OSH CTH w/Lt BG IPH, IVH. At  given DDAVP + keppra. Transferred to [facility] for further neurosurgery     Admission Scores  GCS: 14 ICH: 2    HOSPITAL COURSE:    Allergies    No Known Allergies    Intolerances        REVIEW OF SYSTEMS: [ ] Unable to Assess due to neurologic exam   [ x] All ROS addressed below are non-contributory, except:  Neuro: [ ] Headache [ ] Back pain [ ] Numbness [ ] Weakness [ ] Ataxia [ ] Dizziness [ ] Aphasia [ ] Dysarthria [ ] Visual disturbance  Resp: [ ] Shortness of breath/dyspnea, [ ] Orthopnea [ ] Cough  CV: [ ] Chest pain [ ] Palpitation [ ] Lightheadedness [ ] Syncope  Renal: [ ] Thirst [ ] Edema  GI: [ ] Nausea [ ] Emesis [ ] Abdominal pain [ ] Constipation [ ] Diarrhea  Hem: [ ] Hematemesis [ ] bright red blood per rectum  ID: [ ] Fever [ ] Chills [ ] Dysuria  ENT: [ ] Rhinorrhea      DEVICES:   [ ] Restraints [ ] ET tube [ ] central line [ ] arterial line [ ] tamez [ ] NGT/OGT [ ] EVD [ ] LD [ ] DEVON/HMV [ ] Trach [ ] PEG [ ] Chest Tube     VITALS:   Vital Signs Last 24 Hrs  T(C): 36.6 (05 Jul 2025 11:10), Max: 36.6 (05 Jul 2025 11:10)  T(F): 97.9 (05 Jul 2025 11:10), Max: 97.9 (05 Jul 2025 11:10)  HR: 100 (05 Jul 2025 14:30) (62 - 100)  BP: 129/74 (05 Jul 2025 14:30) (129/74 - 184/78)  BP(mean): 94 (05 Jul 2025 14:30) (94 - 94)  RR: 20 (05 Jul 2025 14:30) (16 - 20)  SpO2: 96% (05 Jul 2025 14:30) (96% - 99%)    Parameters below as of 05 Jul 2025 14:30  Patient On (Oxygen Delivery Method): room air      CAPILLARY BLOOD GLUCOSE        I&O's Summary      Respiratory: room air        LABS:      MEDICATIONS  (STANDING):  atorvastatin 10 milliGRAM(s) Oral at bedtime  calcium carbonate    500 mG (Tums) Chewable 1 Tablet(s) Chew daily  chlorhexidine 4% Liquid 1 Application(s) Topical daily  cholecalciferol 2000 Unit(s) Oral daily  levETIRAcetam  IVPB 500 milliGRAM(s) IV Intermittent every 12 hours  levothyroxine 88 MICROGram(s) Oral daily  niCARdipine Infusion 5 mG/Hr (25 mL/Hr) IV Continuous <Continuous>  polyethylene glycol 3350 17 Gram(s) Oral daily  sodium chloride 0.9%. 500 milliLiter(s) (50 mL/Hr) IV Continuous <Continuous>    MEDICATIONS  (PRN):  acetaminophen     Tablet .. 650 milliGRAM(s) Oral every 6 hours PRN Temp greater or equal to 38.5C (101.3F), Mild Pain (1 - 3)  senna 2 Tablet(s) Oral at bedtime PRN Constipation          IMAGING:      EXAMINATION:  PHYSICAL EXAM:    Constitutional: No Acute Distress     Neurological: Easily arousable, EOV, oriented to self and place, following commands Awake, alert oriented to person, place and time, PERRL, EOMI, No Gaze Preference, Right facial, dysarthric, Ride side neglect, RUE 3/5, LUE 5/5, BLE 5/5    Motor exam:          Upper extremity                         Delt     Bicep     Tricep    HG                                                 R         5/5        5/5        5/5       5/5                                               L          5/5        5/5        5/5       5/5          Lower extremity                        HF         KF        KE       DF         PF                                                  R        5/5        5/5        5/5       5/5         5/5                                               L         5/5        5/5       5/5       5/5          5/5                                                 Pulmonary: No rales, No rhonchi, No wheezes     Cardiovascular: Regular rate and rhythm     Gastrointestinal: Soft, Non-tender, Non-distended     Extremities: No calf tenderness        HPI:  84F on ASA81, PMHx HTN, HLD, and hypothyroid. Transfer from  after patient was found down, no external signs of trauma. OSH CTH w/Lt BG IPH, IVH. At  given DDAVP + keppra. Transferred to [facility] for further neurosurgery     Admission Scores  GCS: 14 ICH: 2    HOSPITAL COURSE:    Allergies    No Known Allergies    Intolerances        REVIEW OF SYSTEMS: [ ] Unable to Assess due to neurologic exam   [ x] All ROS addressed below are non-contributory, except:  Neuro: [ ] Headache [ ] Back pain [ ] Numbness [ ] Weakness [ ] Ataxia [ ] Dizziness [ ] Aphasia [ ] Dysarthria [ ] Visual disturbance  Resp: [ ] Shortness of breath/dyspnea, [ ] Orthopnea [ ] Cough  CV: [ ] Chest pain [ ] Palpitation [ ] Lightheadedness [ ] Syncope  Renal: [ ] Thirst [ ] Edema  GI: [ ] Nausea [ ] Emesis [ ] Abdominal pain [ ] Constipation [ ] Diarrhea  Hem: [ ] Hematemesis [ ] bright red blood per rectum  ID: [ ] Fever [ ] Chills [ ] Dysuria  ENT: [ ] Rhinorrhea      DEVICES:   [ ] Restraints [ ] ET tube [ ] central line [ ] arterial line [ ] tamez [ ] NGT/OGT [ ] EVD [ ] LD [ ] DEVNO/HMV [ ] Trach [ ] PEG [ ] Chest Tube     VITALS:   Vital Signs Last 24 Hrs  T(C): 36.6 (05 Jul 2025 11:10), Max: 36.6 (05 Jul 2025 11:10)  T(F): 97.9 (05 Jul 2025 11:10), Max: 97.9 (05 Jul 2025 11:10)  HR: 100 (05 Jul 2025 14:30) (62 - 100)  BP: 129/74 (05 Jul 2025 14:30) (129/74 - 184/78)  BP(mean): 94 (05 Jul 2025 14:30) (94 - 94)  RR: 20 (05 Jul 2025 14:30) (16 - 20)  SpO2: 96% (05 Jul 2025 14:30) (96% - 99%)    Parameters below as of 05 Jul 2025 14:30  Patient On (Oxygen Delivery Method): room air      CAPILLARY BLOOD GLUCOSE        I&O's Summary      Respiratory: room air        LABS:      MEDICATIONS  (STANDING):  atorvastatin 10 milliGRAM(s) Oral at bedtime  calcium carbonate    500 mG (Tums) Chewable 1 Tablet(s) Chew daily  chlorhexidine 4% Liquid 1 Application(s) Topical daily  cholecalciferol 2000 Unit(s) Oral daily  levETIRAcetam  IVPB 500 milliGRAM(s) IV Intermittent every 12 hours  levothyroxine 88 MICROGram(s) Oral daily  niCARdipine Infusion 5 mG/Hr (25 mL/Hr) IV Continuous <Continuous>  polyethylene glycol 3350 17 Gram(s) Oral daily  sodium chloride 0.9%. 500 milliLiter(s) (50 mL/Hr) IV Continuous <Continuous>    MEDICATIONS  (PRN):  acetaminophen     Tablet .. 650 milliGRAM(s) Oral every 6 hours PRN Temp greater or equal to 38.5C (101.3F), Mild Pain (1 - 3)  senna 2 Tablet(s) Oral at bedtime PRN Constipation          IMAGING:      EXAMINATION:  PHYSICAL EXAM:    Constitutional: No Acute Distress     Neurological: Easily arousable, EOV, oriented to self and place, following commands, PERRL, EOMI, No Gaze Preference, Right facial, dysarthric, Ride side neglect, RUE 3/5, LUE 5/5, BLE 5/5    Motor exam:          Upper extremity                         Delt     Bicep     Tricep    HG                                                 R         5/5        5/5        5/5       5/5                                               L          5/5        5/5        5/5       5/5          Lower extremity                        HF         KF        KE       DF         PF                                                  R        5/5        5/5        5/5       5/5         5/5                                               L         5/5        5/5       5/5       5/5          5/5                                                 Pulmonary: No rales, No rhonchi, No wheezes     Cardiovascular: Regular rate and rhythm     Gastrointestinal: Soft, Non-tender, Non-distended     Extremities: No calf tenderness        HPI:  84F on ASA81, PMHx HTN, HLD, and hypothyroid. Transfer from  after patient was found down, no external signs of trauma. OSH CTH w/Lt BG IPH, IVH. At  given DDAVP + keppra. Transferred to [facility] for further neurosurgery     Admission Scores  GCS: 14 ICH: 2    HOSPITAL COURSE:    Allergies    No Known Allergies    Intolerances        REVIEW OF SYSTEMS: [ ] Unable to Assess due to neurologic exam   [ x] All ROS addressed below are non-contributory, except:  Neuro: [ ] Headache [ ] Back pain [ ] Numbness [ ] Weakness [ ] Ataxia [ ] Dizziness [ ] Aphasia [ ] Dysarthria [ ] Visual disturbance  Resp: [ ] Shortness of breath/dyspnea, [ ] Orthopnea [ ] Cough  CV: [ ] Chest pain [ ] Palpitation [ ] Lightheadedness [ ] Syncope  Renal: [ ] Thirst [ ] Edema  GI: [ ] Nausea [ ] Emesis [ ] Abdominal pain [ ] Constipation [ ] Diarrhea  Hem: [ ] Hematemesis [ ] bright red blood per rectum  ID: [ ] Fever [ ] Chills [ ] Dysuria  ENT: [ ] Rhinorrhea      DEVICES:   [ ] Restraints [ ] ET tube [ ] central line [ ] arterial line [ ] tamez [ ] NGT/OGT [ ] EVD [ ] LD [ ] DEVON/HMV [ ] Trach [ ] PEG [ ] Chest Tube     VITALS:   Vital Signs Last 24 Hrs  T(C): 36.6 (05 Jul 2025 11:10), Max: 36.6 (05 Jul 2025 11:10)  T(F): 97.9 (05 Jul 2025 11:10), Max: 97.9 (05 Jul 2025 11:10)  HR: 100 (05 Jul 2025 14:30) (62 - 100)  BP: 129/74 (05 Jul 2025 14:30) (129/74 - 184/78)  BP(mean): 94 (05 Jul 2025 14:30) (94 - 94)  RR: 20 (05 Jul 2025 14:30) (16 - 20)  SpO2: 96% (05 Jul 2025 14:30) (96% - 99%)    Parameters below as of 05 Jul 2025 14:30  Patient On (Oxygen Delivery Method): room air      CAPILLARY BLOOD GLUCOSE        I&O's Summary      Respiratory: room air        LABS:      MEDICATIONS  (STANDING):  atorvastatin 10 milliGRAM(s) Oral at bedtime  calcium carbonate    500 mG (Tums) Chewable 1 Tablet(s) Chew daily  chlorhexidine 4% Liquid 1 Application(s) Topical daily  cholecalciferol 2000 Unit(s) Oral daily  levETIRAcetam  IVPB 500 milliGRAM(s) IV Intermittent every 12 hours  levothyroxine 88 MICROGram(s) Oral daily  niCARdipine Infusion 5 mG/Hr (25 mL/Hr) IV Continuous <Continuous>  polyethylene glycol 3350 17 Gram(s) Oral daily  sodium chloride 0.9%. 500 milliLiter(s) (50 mL/Hr) IV Continuous <Continuous>    MEDICATIONS  (PRN):  acetaminophen     Tablet .. 650 milliGRAM(s) Oral every 6 hours PRN Temp greater or equal to 38.5C (101.3F), Mild Pain (1 - 3)  senna 2 Tablet(s) Oral at bedtime PRN Constipation          IMAGING:      EXAMINATION:  PHYSICAL EXAM:    Constitutional: No Acute Distress     Neurological: Easily arousable, EOV, oriented to self and place, following commands, PERRL, EOMI, No Gaze Preference, Right facial, dysarthric, Ride side neglect, RUE 3/5, LUE 5/5, BLE 5/5                                                   Pulmonary: No rales, No rhonchi, No wheezes     Cardiovascular: Regular rate and rhythm     Gastrointestinal: Soft, Non-tender, Non-distended     Extremities: No calf tenderness

## 2025-07-05 NOTE — H&P ADULT - ATTENDING COMMENTS
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   Plan:   -  Coags, ARU   -  Repeat CTH 4 hrs in ED, CTA H/N at same time  - ?NPO  - ?-140   - ?Admit to NSCU for hydrowatch/q1 neurochecks  - Stroke neuroeval

## 2025-07-05 NOTE — ED ADULT NURSE NOTE - NS ED NURSE RECORD ANOTHER VITAL SIGN
Bobby Moise presents to the clinic today for Consultation for Cardiomyopathy. Most recent EF: 29%.  Medications reviewed and education completed. Patient states he is feeling fairly well and has complaints of shortness of breath and fatigue  He was referred by Dr Odell. He recently had a CHF admission but that was more due to a urinary obstruction. He states that he had hypothyroid and is now on meds and is feeling much better!  He lives a lone and does his own cooking.     Shortness of breath: moderate with activity. His family will start doing lawn mowing for him.     Sodium restriction: Following Low Sodium Diet  Fluid restriction: Following Fluid Restriction. Drinks 60 oz per day.   Daily weights: Does weigh self daily.  Exercise: No but he is active with house and yard work  Medication compliance: compliance with current medication.    Labs reviewed with NP and patient.   Patient seen by: Pharmacist  Weight: 161.3 lbs    Blood pressure 133/77, pulse 76, weight 73.2 kg (161 lb 4.8 oz), SpO2 100%.    Per NP :   [facility]-Medicare A and B-approved. Pt will talk to Dr Akins first.   Decrease Midodrine to 10 mg tid x 1 week, then 5 mg, 2.5 mg then off if tolerated.   Cardiac rehab ordered  IV iron-not needed    Follow up:   Aug 2nd     Yes

## 2025-07-05 NOTE — ED ADULT NURSE NOTE - NSFALLHARMRISKINTERV_ED_ALL_ED

## 2025-07-05 NOTE — CHART NOTE - NSCHARTNOTEFT_GEN_A_CORE
This writer at bedside with Dr. Hoffman and Dr. Jacobs at this time. During neuro examinations patient noted to be only distally antigravity in her right upper extremity. Dr. Choudhury and Dalton advised and aware, presently conducting exam on patient. This writer recommending a CT of head at this time. Per both physicians no scan is indicated at this time. This writer at bedside with Dr. Hoffman and Dr. Jacobs at this time. During neuro examinations patient noted to be only distally antigravity in her right upper extremity representing a change from previously reported examination. Dr. Choudhury and Dalton advised and aware, both conducted exams on patient. This writer recommending a CT of head at this time. Per both physicians no scan is indicated at this time.

## 2025-07-05 NOTE — PROVIDER CONTACT NOTE (OTHER) - BACKGROUND
Patient found down in shower in AM. CTH showed Acute Parenchymal Hemorrhage w/ IVH. In report patient had a change in exam before 1900. Patient was lethargic and not moving RUE.

## 2025-07-05 NOTE — CHART NOTE - NSCHARTNOTEFT_GEN_A_CORE
CAPRINI SCORE [CLOT] Score on Admission for     AGE RELATED RISK FACTORS                                                       MOBILITY RELATED FACTORS  [ ] Age 41-60 years                                            (1 Point)                  [ ] Bed rest                                                        (1 Point)  [ ] Age: 61-74 years                                           (2 Points)                 [ ] Plaster cast                                                   (2 Points)  [ x] Age= 75 years                                              (3 Points)                 [ ] Bed bound for more than 72 hours                 (2 Points)    DISEASE RELATED RISK FACTORS                                               GENDER SPECIFIC FACTORS  [ ] Edema in the lower extremities                       (1 Point)                  [ ] Pregnancy                                                     (1 Point)  [ ] Varicose veins                                               (1 Point)                  [ ] Post-partum < 6 weeks                                   (1 Point)             [ ] BMI > 25 Kg/m2                                            (1 Point)                  [ ] Hormonal therapy  or oral contraception          (1 Point)                 [ ] Sepsis (in the previous month)                        (1 Point)                  [ ] History of pregnancy complications                 (1 point)  [ ] Pneumonia or serious lung disease                                               [ ] Unexplained or recurrent                     (1 Point)           (in the previous month)                               (1 Point)  [ ] Abnormal pulmonary function test                     (1 Point)                 SURGERY RELATED RISK FACTORS (include planned surgeries)  [ ] Acute myocardial infarction                              (1 Point)                 [ ]  Section                                             (1 Point)  [ ] Congestive heart failure (in the previous month)  (1 Point)         [ ] Minor surgery                                                  (1 Point)   [ ] Inflammatory bowel disease                             (1 Point)                 [ ] Arthroscopic surgery                                        (2 Points)  [ ] Central venous access                                      (2 Points)                [ ] General surgery lasting more than 45 minutes   (2 Points)       [ ] Stroke (in the previous month)                          (5 Points)               [ ] Elective arthroplasty                                         (5 Points)            [ ] current or past malignancy                              (2 Points)                                                                                                       HEMATOLOGY RELATED FACTORS                                                 TRAUMA RELATED RISK FACTORS  [ ] Prior episodes of VTE                                     (3 Points)                [ ] Fracture of the hip, pelvis, or leg                       (5 Points)  [ ] Positive family history for VTE                         (3 Points)                 [ ] Acute spinal cord injury (in the previous month)  (5 Points)  [ ] Prothrombin 25938 A                                     (3 Points)                 [ ] Paralysis  (less than 1 month)                             (5 Points)  [ ] Factor V Leiden                                             (3 Points)                  [ ] Multiple Trauma within 1 month                        (5 Points)  [ ] Lupus anticoagulants                                     (3 Points)                                                           [ ] Anticardiolipin antibodies                               (3 Points)                                                       [ ] High homocysteine in the blood                      (3 Points)                                             [ ] Other congenital or acquired thrombophilia      (3 Points)                                                [ ] Heparin induced thrombocytopenia                  (3 Points)                                          Total Score [     1     ]    Risk:  Very low 0   Low 1 to 2   Moderate 3 to 4   High =5       VTE Prophylasix Recommednations:  [ x] mechanical pneumatic compression devices                                      [ ] contraindicated: _____________________  [ ] chemo prophylasix                                                                                   [ ] contraindicated _____________________    **** HIGH LIKELIHOOD DVT PRESENT ON ADMISSION  [ ] (please order LE dopplers within 24 hours of admission)

## 2025-07-05 NOTE — ED ADULT NURSE NOTE - OBJECTIVE STATEMENT
84y female presents as head bleed transfer from [facility]. EMS states pt was found by  on floor in the shower around 0845. EMS states pt was brought to [facility] and found to have right sided facial droop, right sided weakness and aphasia. EMS states pt was diagnosed with headbleed on CT scan at [facility]; Pt has received 500mg of keppra, 19 mcg of desmopressin and 4mg of zofran prior to arrival to [facility]. Pt is currently AOx2 to person and place; daughter states pt is normally able to say the year. Pt denies chest pain, shortness of breath, vomiting, diarrhea, urinary symptoms, fever, chills.

## 2025-07-05 NOTE — H&P ADULT - NSHPLABSRESULTS_GEN_ALL_CORE
p (1480)     LABS:                          13.2   7.35  )-----------( 150      ( 05 Jul 2025 09:23 )             39.8     07-05    140  |  104  |  23  ----------------------------<  142[H]  3.5   |  23  |  0.74    Ca    9.0      05 Jul 2025 09:23    TPro  7.5  /  Alb  3.7  /  TBili  0.6  /  DBili  x   /  AST  20  /  ALT  12  /  AlkPhos  69  07-05 LABS:                          13.2   7.35  )-----------( 150      ( 05 Jul 2025 09:23 )             39.8     07-05    140  |  104  |  23  ----------------------------<  142[H]  3.5   |  23  |  0.74    Ca    9.0      05 Jul 2025 09:23    TPro  7.5  /  Alb  3.7  /  TBili  0.6  /  DBili  x   /  AST  20  /  ALT  12  /  AlkPhos  69  07-05

## 2025-07-05 NOTE — ED ADULT NURSE NOTE - CAS DISCH TRANSFER METHOD
Resume Xarelto.      Hemoglobin stable.  No evidence of active bleeding.  The patient is not having hematochezia.   Ambulance

## 2025-07-05 NOTE — ED PROVIDER NOTE - CLINICAL SUMMARY MEDICAL DECISION MAKING FREE TEXT BOX
84 year old female brought in by  call my family because the patient was found in the shower on the ground noted facial droop unable to speak also notified right sided weakness stroke code was activated patient was evaluated in the triage rush to the ct  pt ct consistent with intracebral bleed pt was transferred

## 2025-07-05 NOTE — ED ADULT TRIAGE NOTE - CHIEF COMPLAINT QUOTE
BIBEMS s/p fall. per EMS pt was found with rt sided facial droop, rt sided weakness, aphasia. symptoms persistent in ED, drooling also noted. code stroke called at via [facility] prenotification. . BIBEMS s/p fall. per EMS pt was found with rt sided facial droop, rt sided weakness, aphasia. symptoms persistent in ED, drooling also noted. code stroke called at via [facility] prenotification. . LKW [id number]. BIBEMS s/p fall. per EMS pt was found with rt sided facial droop, rt sided weakness, aphasia. symptoms persistent in ED, drooling also noted. code stroke called at 0903 via [facility] prenotification. . LKW [id number]. pt sent straight to CT from triage.

## 2025-07-06 LAB
ADD ON TEST-SPECIMEN IN LAB: SIGNIFICANT CHANGE UP
ANION GAP SERPL CALC-SCNC: 15 MMOL/L — SIGNIFICANT CHANGE UP (ref 5–17)
APPEARANCE UR: CLEAR — SIGNIFICANT CHANGE UP
BACTERIA # UR AUTO: NEGATIVE /HPF — SIGNIFICANT CHANGE UP
BILIRUB UR-MCNC: NEGATIVE — SIGNIFICANT CHANGE UP
BUN SERPL-MCNC: 19 MG/DL — SIGNIFICANT CHANGE UP (ref 7–23)
CALCIUM SERPL-MCNC: 9 MG/DL — SIGNIFICANT CHANGE UP (ref 8.4–10.5)
CAST: 0 /LPF — SIGNIFICANT CHANGE UP (ref 0–4)
CHLORIDE SERPL-SCNC: 102 MMOL/L — SIGNIFICANT CHANGE UP (ref 96–108)
CO2 SERPL-SCNC: 18 MMOL/L — LOW (ref 22–31)
COLOR SPEC: YELLOW — SIGNIFICANT CHANGE UP
CREAT SERPL-MCNC: 0.57 MG/DL — SIGNIFICANT CHANGE UP (ref 0.5–1.3)
DIFF PNL FLD: ABNORMAL
EGFR: 91 ML/MIN/1.73M2 — SIGNIFICANT CHANGE UP
EGFR: 91 ML/MIN/1.73M2 — SIGNIFICANT CHANGE UP
GLUCOSE SERPL-MCNC: 133 MG/DL — HIGH (ref 70–99)
GLUCOSE UR QL: NEGATIVE MG/DL — SIGNIFICANT CHANGE UP
HCT VFR BLD CALC: 36.2 % — SIGNIFICANT CHANGE UP (ref 34.5–45)
HGB BLD-MCNC: 11.7 G/DL — SIGNIFICANT CHANGE UP (ref 11.5–15.5)
KETONES UR QL: ABNORMAL MG/DL
LEUKOCYTE ESTERASE UR-ACNC: NEGATIVE — SIGNIFICANT CHANGE UP
MAGNESIUM SERPL-MCNC: 2.2 MG/DL — SIGNIFICANT CHANGE UP (ref 1.6–2.6)
MCHC RBC-ENTMCNC: 28.7 PG — SIGNIFICANT CHANGE UP (ref 27–34)
MCHC RBC-ENTMCNC: 32.3 G/DL — SIGNIFICANT CHANGE UP (ref 32–36)
MCV RBC AUTO: 88.9 FL — SIGNIFICANT CHANGE UP (ref 80–100)
NITRITE UR-MCNC: NEGATIVE — SIGNIFICANT CHANGE UP
NRBC # BLD AUTO: 0 K/UL — SIGNIFICANT CHANGE UP (ref 0–0)
NRBC # FLD: 0 K/UL — SIGNIFICANT CHANGE UP (ref 0–0)
NRBC BLD AUTO-RTO: 0 /100 WBCS — SIGNIFICANT CHANGE UP (ref 0–0)
PH UR: 6.5 — SIGNIFICANT CHANGE UP (ref 5–8)
PHOSPHATE SERPL-MCNC: 3.4 MG/DL — SIGNIFICANT CHANGE UP (ref 2.5–4.5)
PLATELET # BLD AUTO: 150 K/UL — SIGNIFICANT CHANGE UP (ref 150–400)
PMV BLD: 10.6 FL — SIGNIFICANT CHANGE UP (ref 7–13)
POTASSIUM SERPL-MCNC: 4.4 MMOL/L — SIGNIFICANT CHANGE UP (ref 3.5–5.3)
POTASSIUM SERPL-SCNC: 4.4 MMOL/L — SIGNIFICANT CHANGE UP (ref 3.5–5.3)
PROT UR-MCNC: SIGNIFICANT CHANGE UP MG/DL
RBC # BLD: 4.07 M/UL — SIGNIFICANT CHANGE UP (ref 3.8–5.2)
RBC # FLD: 14 % — SIGNIFICANT CHANGE UP (ref 10.3–14.5)
RBC CASTS # UR COMP ASSIST: 36 /HPF — HIGH (ref 0–4)
SODIUM SERPL-SCNC: 135 MMOL/L — SIGNIFICANT CHANGE UP (ref 135–145)
SP GR SPEC: 1.03 — SIGNIFICANT CHANGE UP (ref 1–1.03)
SQUAMOUS # UR AUTO: 1 /HPF — SIGNIFICANT CHANGE UP (ref 0–5)
UROBILINOGEN FLD QL: 0.2 MG/DL — SIGNIFICANT CHANGE UP (ref 0.2–1)
WBC # BLD: 8.75 K/UL — SIGNIFICANT CHANGE UP (ref 3.8–10.5)
WBC # FLD AUTO: 8.75 K/UL — SIGNIFICANT CHANGE UP (ref 3.8–10.5)
WBC UR QL: 2 /HPF — SIGNIFICANT CHANGE UP (ref 0–5)

## 2025-07-06 PROCEDURE — 82962 GLUCOSE BLOOD TEST: CPT

## 2025-07-06 PROCEDURE — 99291 CRITICAL CARE FIRST HOUR: CPT

## 2025-07-06 PROCEDURE — 96374 THER/PROPH/DIAG INJ IV PUSH: CPT

## 2025-07-06 PROCEDURE — 93005 ELECTROCARDIOGRAM TRACING: CPT

## 2025-07-06 PROCEDURE — 80053 COMPREHEN METABOLIC PANEL: CPT

## 2025-07-06 PROCEDURE — 72125 CT NECK SPINE W/O DYE: CPT

## 2025-07-06 PROCEDURE — 82140 ASSAY OF AMMONIA: CPT

## 2025-07-06 PROCEDURE — 96375 TX/PRO/DX INJ NEW DRUG ADDON: CPT

## 2025-07-06 PROCEDURE — 73120 X-RAY EXAM OF HAND: CPT | Mod: 26,RT

## 2025-07-06 PROCEDURE — 85025 COMPLETE CBC W/AUTO DIFF WBC: CPT

## 2025-07-06 PROCEDURE — 70450 CT HEAD/BRAIN W/O DYE: CPT

## 2025-07-06 PROCEDURE — 99285 EMERGENCY DEPT VISIT HI MDM: CPT | Mod: 25

## 2025-07-06 PROCEDURE — 84484 ASSAY OF TROPONIN QUANT: CPT

## 2025-07-06 PROCEDURE — 70450 CT HEAD/BRAIN W/O DYE: CPT | Mod: 26

## 2025-07-06 PROCEDURE — 36415 COLL VENOUS BLD VENIPUNCTURE: CPT

## 2025-07-06 RX ORDER — ONDANSETRON HCL/PF 4 MG/2 ML
4 VIAL (ML) INJECTION ONCE
Refills: 0 | Status: COMPLETED | OUTPATIENT
Start: 2025-07-06 | End: 2025-07-06

## 2025-07-06 RX ORDER — CARVEDILOL 3.12 MG/1
12.5 TABLET, FILM COATED ORAL EVERY 12 HOURS
Refills: 0 | Status: DISCONTINUED | OUTPATIENT
Start: 2025-07-06 | End: 2025-07-07

## 2025-07-06 RX ORDER — LEVOTHYROXINE SODIUM 300 MCG
44 TABLET ORAL ONCE
Refills: 0 | Status: COMPLETED | OUTPATIENT
Start: 2025-07-06 | End: 2025-07-06

## 2025-07-06 RX ADMIN — CARVEDILOL 12.5 MILLIGRAM(S): 3.12 TABLET, FILM COATED ORAL at 17:29

## 2025-07-06 RX ADMIN — Medication 1 APPLICATION(S): at 12:19

## 2025-07-06 RX ADMIN — Medication 4 MILLIGRAM(S): at 17:29

## 2025-07-06 RX ADMIN — Medication 5 MILLIGRAM(S): at 23:09

## 2025-07-06 RX ADMIN — ATORVASTATIN CALCIUM 10 MILLIGRAM(S): 80 TABLET, FILM COATED ORAL at 22:16

## 2025-07-06 RX ADMIN — Medication 44 MICROGRAM(S): at 09:39

## 2025-07-06 RX ADMIN — Medication 2000 UNIT(S): at 12:16

## 2025-07-06 NOTE — PROGRESS NOTE ADULT - SUBJECTIVE AND OBJECTIVE BOX
NSCU ATTENDING -- ADDITIONAL PROGRESS NOTE    Nighttime rounds were performed -- please refer to earlier Progress Note for HPI details.    T(C): 36.7 (07-06-25 @ 19:00), Max: 36.8 (07-05-25 @ 23:00)  HR: 81 (07-06-25 @ 19:00) (75 - 112)  BP: 159/78 (07-06-25 @ 19:00) (106/51 - 159/78)  RR: 37 (07-06-25 @ 19:00) (20 - 37)  SpO2: 93% (07-06-25 @ 19:00) (91% - 97%)  Wt(kg): --    Relevant labwork and imaging reviewed.    has not needed EVD placement.  MRI pending.  possible xfer to stroke service in AM.  lab holiday.

## 2025-07-06 NOTE — PHYSICAL THERAPY INITIAL EVALUATION ADULT - ACTIVE RANGE OF MOTION EXAMINATION, REHAB EVAL
DAVID MCDONALD WFL. Pt with trigger finger on R middle finger./Left UE Active ROM was WFL (within functional limits)/bilateral  lower extremity Active ROM was WFL (within functional limits)

## 2025-07-06 NOTE — SWALLOW FEES ASSESSMENT ADULT - RECOMMENDED FEEDING/EATING TECHNIQUES
Discharged in no distress accompanied to passenger side of car with family or significant other driving car. Assessment unchanged. Patient denies pain.
Mom updated in waiting room. Discharge instructions reviewed with patient and responsible adult. Discharge instructions signed and copy given with no additional questions. Patient to be discharged home with belongings. Script sent to pharmacy per Md. Mri order sheet given.
Mom updated per Md.
Obstructive Sleep Apnea (KIRBY) Screening     Patient:  Ashish Stanley    YOB: 1965      Medical Record #:  [id number]                     Date:  9/20/2021     1. Are you a loud and/or regular snorer? []  Yes       [x] No    2. Have you been observed to gasp or stop breathing during sleep? []  Yes       [x] No    3. Do you feel tired or groggy upon awakening or do you awaken with a headache?           []  Yes       [] No    4. Are you often tired or fatigued during the wake time hours? []  Yes       [] No    5. Do you fall asleep sitting, reading, watching TV or driving? []  Yes       [] No    6. Do you often have problems with memory or concentration? []  Yes       [] No    **If patient's score is ? 3 they are considered high risk for KIRBY. An Anesthesia provider will evaluate the patient and develop a plan of care the day of surgery. Note:  If the patient's BMI is more than 35 kg m¯² , has neck circumference > 40 cm, and/or high blood pressure the risk is greater (© [employer], 2006).
Patient awake alert ready to go home.
Patient updated per Md.
maintain upright posture during/after eating for 30 mins

## 2025-07-06 NOTE — SWALLOW FEES ASSESSMENT ADULT - SLP GENERAL OBSERVATIONS
Pt received upright in chair, AAOx2, on RA, vocal quality hoarse. Dtr at bedside preferred to translate to English for this visit

## 2025-07-06 NOTE — PHYSICAL THERAPY INITIAL EVALUATION ADULT - PERTINENT HX OF CURRENT PROBLEM, REHAB EVAL
Pt is an 85yo F with history of HTN, HLD, and hypothyroidism originally presented to  after being found down by family transferred to [facility] for higher level of care of [facility] IPH with IVH. Pt had no obvious signs of trauma per documentation. Pt received DDAVP for ASA use. CTH 7/5/25: Grossly unchanged left thalamic and corona radiata hemorrhage with intraventricular extension. (-) Head/Neck CTA 7/5/25. (-) [facility] Duplex BLE Vein Scan 7/5/25.

## 2025-07-06 NOTE — SWALLOW BEDSIDE ASSESSMENT ADULT - ORAL PREPARATORY PHASE
Within functional limits Slow yet functional mastication/Within functional limits/Decreased mastication ability

## 2025-07-06 NOTE — SWALLOW BEDSIDE ASSESSMENT ADULT - PHARYNGEAL PHASE
Delayed pharyngeal swallow/Decreased laryngeal elevation/Multiple swallows Delayed pharyngeal swallow/Decreased laryngeal elevation/Wet vocal quality post oral intake/Multiple swallows

## 2025-07-06 NOTE — SPEECH LANGUAGE PATHOLOGY EVALUATION - COMMENTS
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    CTH 7/5: "Left thalamic and corona radiata hemorrhage with intraventricular extension. Stable ventricular size but close follow-up advised."  CTA BRAIN 7/5: "No arterial occlusion or significant stenosis. No aneurysm or other arterial source for the left parenchymal hemorrhage."

## 2025-07-06 NOTE — CONSULT NOTE ADULT - SUBJECTIVE AND OBJECTIVE BOX
DATE OF SERVICE: 07-06-25 @ 14:44    CHIEF COMPLAINT:Patient is a 84y old  Female who presents with a chief complaint of IPH w/ IVH (06 Jul 2025 13:52)      HISTORY OF PRESENT ILLNESS:HPI:  HPI: 84F on ASA81, PMHx HTN, HLD pt found down, no external signs of trauma @[facility] w/Lt BG IPH, IVH , DDAVP + keppra given at [facility]. Plts wnl    Exam: Easily arousable, EOV, Ox1, PERRL, EOMI, Rt facial, RUE 3/5, LUE 5/5, BLE 5/5       --Anticoagulation:    =====================  PAST MEDICAL HISTORY   No pertinent past medical history      PAST SURGICAL HISTORY     No Known Allergies      MEDICATIONS:  Antibiotics:    Neuro:    Other:      SOCIAL HISTORY:   Occupation:   Marital Status:     FAMILY HISTORY:      ROS: Negative except per HPI    LABS:               (05 Jul 2025 13:23)      PAST MEDICAL & SURGICAL HISTORY:  No pertinent past medical history              MEDICATIONS:  carvedilol 12.5 milliGRAM(s) Oral every 12 hours  losartan 100 milliGRAM(s) Oral daily        acetaminophen     Tablet .. 650 milliGRAM(s) Oral every 6 hours PRN  ondansetron Injectable 4 milliGRAM(s) IV Push once    calcium carbonate    500 mG (Tums) Chewable 1 Tablet(s) Chew daily  polyethylene glycol 3350 17 Gram(s) Oral daily  senna 2 Tablet(s) Oral at bedtime PRN    atorvastatin 10 milliGRAM(s) Oral at bedtime  levothyroxine 88 MICROGram(s) Oral daily    chlorhexidine 4% Liquid 1 Application(s) Topical daily  cholecalciferol 2000 Unit(s) Oral daily      FAMILY HISTORY:      Non-contributory    SOCIAL HISTORY:     Tobacco - denies   Alcohol - socially    Allergies    No Known Allergies    Intolerances    	    REVIEW OF SYSTEMS:  CONSTITUTIONAL: No fever  EYES: No eye pain, visual disturbances, or discharge  ENMT:  No difficulty hearing, tinnitus  NECK: No pain or stiffness  RESPIRATORY: No cough, wheezing,  CARDIOVASCULAR: No chest pain, palpitations, passing out, dizziness, or leg swelling  GASTROINTESTINAL:  No nausea, vomiting, diarrhea or constipation. No melena.  GENITOURINARY: No dysuria, hematuria  NEUROLOGICAL: No stroke like symptoms  SKIN: No burning or lesions   ENDOCRINE: No heat or cold intolerance  MUSCULOSKELETAL: No joint pain or swelling  PSYCHIATRIC: No  anxiety, mood swings  HEME/LYMPH: No bleeding gums  ALLERGY AND IMMUNOLOGIC: No hives or eczema	    All other ROS negative    PHYSICAL EXAM:  T(C): 36.7 (07-06-25 @ 11:00), Max: 36.8 (07-05-25 @ 23:00)  HR: 87 (07-06-25 @ 11:00) (75 - 112)  BP: 143/65 (07-06-25 @ 11:00) (106/51 - 174/72)  RR: 34 (07-06-25 @ 11:00) (18 - 34)  SpO2: 97% (07-06-25 @ 11:00) (91% - 100%)  Wt(kg): --  I&O's Summary    05 Jul 2025 07:01  -  06 Jul 2025 07:00  --------------------------------------------------------  IN: 1065 mL / OUT: 1700 mL / NET: -635 mL    06 Jul 2025 07:01  -  06 Jul 2025 14:44  --------------------------------------------------------  IN: 130 mL / OUT: 0 mL / NET: 130 mL        Appearance: Normal	  HEENT:   Normal oral mucosa, EOMI	  Cardiovascular:  S1 S2, No JVD,    Respiratory: Lungs clear to auscultation	  Psychiatry: Alert  Gastrointestinal:  Soft, Non-tender, + BS	  Skin: No rashes   Neurologic: Non-focal  Extremities:  No edema  Vascular: Peripheral pulses palpable    	    	  	  CARDIAC MARKERS:  Labs personally reviewed by me                                  11.7   8.75  )-----------( 150      ( 06 Jul 2025 04:56 )             36.2     07-06    135  |  102  |  19  ----------------------------<  133[H]  4.4   |  18[L]  |  0.57    Ca    9.0      06 Jul 2025 04:56  Phos  3.4     07-06  Mg     2.2     07-06    TPro  7.3  /  Alb  4.2  /  TBili  0.6  /  DBili  x   /  AST  19  /  ALT  12  /  AlkPhos  66  07-05          EKG: Personally reviewed by me -   Radiology: Personally reviewed by me -     < from: CT Head No Cont (07.05.25 @ 13:53) >  CT HEAD:    No new or increasing intracranial hemorrhage since this morning's scan at   outside hospital (separate record number, accession #[id number]).    Left thalamic and corona radiata hemorrhage with intraventricular   extension. Stable ventricular size but close follow-up advised.      CTA BRAIN:  No arterial occlusion or significant stenosis. No aneurysm or other   arterial source for the left parenchymal hemorrhage.    CTA NECK:  No arterial steno-occlusive disease or evidence of dissection.      < from: CT Head No Cont (07.05.25 @ 21:29) >  IMPRESSION:  Grossly unchanged left thalamic and corona radiata hemorrhage with   intraventricular extension.      < from: CT Head No Cont (07.06.25 @ 09:22) >    IMPRESSION:    No significant interval change from CT brain 7/5/2025.      < from: [facility] Duplex Lower Ext Vein Scan, Bilat (07.05.25 @ 23:09) >  IMPRESSION:  No evidence of deep venous thrombosis in either lower extremity.      < end of copied text >    Assessment /Plan:    84F on ASA81, PMHx HTN, HLD pt found down, no external signs of trauma @ [facility] w/Lt BG IPH, IVH , DDAVP + keppra given at .    Problem 1: ICH  - CT head 7/5 with Left thalamic and corona radiata hemorrhage with intraventricular extension  - Interval CTH stable hemorrhage, CTA head and neck negative  - Appreciate NSICU and Neurosurgery recs  - Seizure ppx: Keppra 500mg BID x 7 days (End date: 7/11/2025)    Problem 2: LE edema  - TTE (OP) 5/2025 - EF 65-70%, mild grade 1 DD, LA mod dilated, lipomatous intratrial septum, ascending aorta 3.8cm, mod-severe TR, mod MR, calcified mitral valve with prolapse of posterior mitral leaflet, mild-mod AR and TN  -- findings stable compared to TTE 2/2024  - Duplex US LE 5/2025 - no evidence of DVT  - Duplex US LE 7/5/25 - no evidence of DVT  - repeat TTE pending    Problem 3: HTN  - At home on candesartan 32 mg, metoprolol 50mg BID & Lasix 20mg   - In hospital, c/w Coreg 12.5mg BID, Losartan 100mg QD    Problem 4: HLD  - On simvastatin 10mg QD at home  - In hospital, c/w Lipitor 10mg     Follows OP cardio Dr. Kartik Perry      Differential diagnosis and plan of care discussed with patient after the evaluation. Counseling on diet, nutritional counseling, weight management, exercise and medication compliance was done.   Advanced care planning/advanced directives discussed with patient/family. DNR status including forceful chest compressions to attempt to restart the heart, ventilator support/artificial breathing, electric shock, artificial nutrition, health care proxy, Molst form all discussed with pt. Pt wishes to consider. Sixteen minutes spent on discussing advanced directives.       MYRON Fleming,  [facility]  Cardiovascular Medicine  [address] [place] [address] Dr, [address]  Available for call or text via [employer] TE[employer]  Office [phone]     DATE OF SERVICE: 07-06-25 @ 14:44    CHIEF COMPLAINT:Patient is a 84y old  Female who presents with a chief complaint of IPH w/ IVH (06 Jul 2025 13:52)      HISTORY OF PRESENT ILLNESS:HPI:  HPI: 84F on ASA81, PMHx HTN, HLD pt found down, no external signs of trauma @[facility] w/Lt BG IPH, IVH , DDAVP + keppra given at [facility]. Plts wnl    Exam: Easily arousable, EOV, Ox1, PERRL, EOMI, Rt facial, RUE 3/5, LUE 5/5, BLE 5/5       --Anticoagulation:    =====================  PAST MEDICAL HISTORY   No pertinent past medical history      PAST SURGICAL HISTORY     No Known Allergies      MEDICATIONS:  Antibiotics:    Neuro:    Other:      SOCIAL HISTORY:   Occupation:   Marital Status:     FAMILY HISTORY:      ROS: Negative except per HPI    LABS:               (05 Jul 2025 13:23)      PAST MEDICAL & SURGICAL HISTORY:  No pertinent past medical history              MEDICATIONS:  carvedilol 12.5 milliGRAM(s) Oral every 12 hours  losartan 100 milliGRAM(s) Oral daily        acetaminophen     Tablet .. 650 milliGRAM(s) Oral every 6 hours PRN  ondansetron Injectable 4 milliGRAM(s) IV Push once    calcium carbonate    500 mG (Tums) Chewable 1 Tablet(s) Chew daily  polyethylene glycol 3350 17 Gram(s) Oral daily  senna 2 Tablet(s) Oral at bedtime PRN    atorvastatin 10 milliGRAM(s) Oral at bedtime  levothyroxine 88 MICROGram(s) Oral daily    chlorhexidine 4% Liquid 1 Application(s) Topical daily  cholecalciferol 2000 Unit(s) Oral daily      FAMILY HISTORY:      Non-contributory    SOCIAL HISTORY:     Tobacco - denies   Alcohol - socially    Allergies    No Known Allergies    Intolerances    	    REVIEW OF SYSTEMS:  CONSTITUTIONAL: No fever  EYES: No eye pain, visual disturbances, or discharge  ENMT:  No difficulty hearing, tinnitus  NECK: No pain or stiffness  RESPIRATORY: No cough, wheezing,  CARDIOVASCULAR: No chest pain, palpitations, passing out, dizziness, or leg swelling  GASTROINTESTINAL:  No nausea, vomiting, diarrhea or constipation. No melena.  GENITOURINARY: No dysuria, hematuria  NEUROLOGICAL: No stroke like symptoms  SKIN: No burning or lesions   ENDOCRINE: No heat or cold intolerance  MUSCULOSKELETAL: No joint pain or swelling  PSYCHIATRIC: No  anxiety, mood swings  HEME/LYMPH: No bleeding gums  ALLERGY AND IMMUNOLOGIC: No hives or eczema	    All other ROS negative    PHYSICAL EXAM:  T(C): 36.7 (07-06-25 @ 11:00), Max: 36.8 (07-05-25 @ 23:00)  HR: 87 (07-06-25 @ 11:00) (75 - 112)  BP: 143/65 (07-06-25 @ 11:00) (106/51 - 174/72)  RR: 34 (07-06-25 @ 11:00) (18 - 34)  SpO2: 97% (07-06-25 @ 11:00) (91% - 100%)  Wt(kg): --  I&O's Summary    05 Jul 2025 07:01  -  06 Jul 2025 07:00  --------------------------------------------------------  IN: 1065 mL / OUT: 1700 mL / NET: -635 mL    06 Jul 2025 07:01  -  06 Jul 2025 14:44  --------------------------------------------------------  IN: 130 mL / OUT: 0 mL / NET: 130 mL        Appearance: Normal	  HEENT:   Normal oral mucosa, EOMI	  Cardiovascular:  S1 S2, No JVD,    Respiratory: Lungs clear to auscultation	  Psychiatry: Alert  Gastrointestinal:  Soft, Non-tender, + BS	  Skin: No rashes   Neurologic: Non-focal  Extremities:  No edema  Vascular: Peripheral pulses palpable    	    	  	  CARDIAC MARKERS:  Labs personally reviewed by me                                  11.7   8.75  )-----------( 150      ( 06 Jul 2025 04:56 )             36.2     07-06    135  |  102  |  19  ----------------------------<  133[H]  4.4   |  18[L]  |  0.57    Ca    9.0      06 Jul 2025 04:56  Phos  3.4     07-06  Mg     2.2     07-06    TPro  7.3  /  Alb  4.2  /  TBili  0.6  /  DBili  x   /  AST  19  /  ALT  12  /  AlkPhos  66  07-05          EKG: Personally reviewed by me - SB 57 bpm   Radiology: Personally reviewed by me -     < from: CT Head No Cont (07.05.25 @ 13:53) >  CT HEAD:    No new or increasing intracranial hemorrhage since this morning's scan at   outside hospital (separate record number, accession #[id number]).    Left thalamic and corona radiata hemorrhage with intraventricular   extension. Stable ventricular size but close follow-up advised.      CTA BRAIN:  No arterial occlusion or significant stenosis. No aneurysm or other   arterial source for the left parenchymal hemorrhage.    CTA NECK:  No arterial steno-occlusive disease or evidence of dissection.      < from: CT Head No Cont (07.05.25 @ 21:29) >  IMPRESSION:  Grossly unchanged left thalamic and corona radiata hemorrhage with   intraventricular extension.      < from: CT Head No Cont (07.06.25 @ 09:22) >    IMPRESSION:    No significant interval change from CT brain 7/5/2025.      < from: [facility] Duplex Lower Ext Vein Scan, Bilat (07.05.25 @ 23:09) >  IMPRESSION:  No evidence of deep venous thrombosis in either lower extremity.      < end of copied text >    Assessment /Plan:    84F on ASA81, PMHx HTN, HLD pt found down, no external signs of trauma @ CTH w/Lt BG IPH, IVH , DDAVP + keppra given at .    Problem 1: ICH  - CT head 7/5 with Left thalamic and corona radiata hemorrhage with intraventricular extension  - Interval CTH stable hemorrhage, CTA head and neck negative  - Appreciate NSICU and Neurosurgery recs  - Seizure ppx: Keppra 500mg BID x 7 days (End date: 7/11/2025)    Problem 2: LE edema  - TTE (OP) 5/2025 - EF 65-70%, mild grade 1 DD, LA mod dilated, lipomatous intratrial septum, ascending aorta 3.8cm, mod-severe TR, mod MR, calcified mitral valve with prolapse of posterior mitral leaflet, mild-mod AR and NV  -- findings stable compared to TTE 2/2024  - Duplex US LE 5/2025 - no evidence of DVT  - Duplex US LE 7/5/25 - no evidence of DVT  - repeat TTE pending    Problem 3: HTN  - At home on candesartan 32 mg, metoprolol 50mg BID & Lasix 20mg   - In hospital, c/w Coreg 12.5mg BID, Losartan 100mg QD    Problem 4: HLD  - On simvastatin 10mg QD at home  - In hospital, c/w Lipitor 10mg     Follows OP cardio Dr. Kartik Perry      Differential diagnosis and plan of care discussed with patient after the evaluation. Counseling on diet, nutritional counseling, weight management, exercise and medication compliance was done.   Advanced care planning/advanced directives discussed with patient/family. DNR status including forceful chest compressions to attempt to restart the heart, ventilator support/artificial breathing, electric shock, artificial nutrition, health care proxy, Molst form all discussed with pt. Pt wishes to consider. Sixteen minutes spent on discussing advanced directives.       MYRON Fleming DO [facility]  Cardiovascular Medicine  [address] [place] [address] Dr, [address]  Available for call or text via [employer] TE[employer]  Office [phone]     DATE OF SERVICE: 07-06-25 @ 14:44    CHIEF COMPLAINT:Patient is a 84y old  Female who presents with a chief complaint of IPH w/ IVH (06 Jul 2025 13:52)      HISTORY OF PRESENT ILLNESS:HPI:  HPI: 84F on ASA81, PMHx HTN, HLD pt found down, no external signs of trauma @[facility] w/Lt BG IPH, IVH , DDAVP + keppra given at . Plts wnl    Exam: Easily arousable, EOV, Ox1, PERRL, EOMI, Rt facial, RUE 3/5, LUE 5/5, BLE 5/       (05 Jul 2025 13:23)      PAST MEDICAL & SURGICAL HISTORY:  No pertinent past medical history          MEDICATIONS:  carvedilol 12.5 milliGRAM(s) Oral every 12 hours  losartan 100 milliGRAM(s) Oral daily        acetaminophen     Tablet .. 650 milliGRAM(s) Oral every 6 hours PRN  ondansetron Injectable 4 milliGRAM(s) IV Push once    calcium carbonate    500 mG (Tums) Chewable 1 Tablet(s) Chew daily  polyethylene glycol 3350 17 Gram(s) Oral daily  senna 2 Tablet(s) Oral at bedtime PRN    atorvastatin 10 milliGRAM(s) Oral at bedtime  levothyroxine 88 MICROGram(s) Oral daily    chlorhexidine 4% Liquid 1 Application(s) Topical daily  cholecalciferol 2000 Unit(s) Oral daily      FAMILY HISTORY:      Non-contributory    SOCIAL HISTORY:     Tobacco - denies   Alcohol - socially    Allergies    No Known Allergies    Intolerances    	    REVIEW OF SYSTEMS: unable to obtain 2/2 mental status  CONSTITUTIONAL: No fever  EYES: No eye pain, visual disturbances, or discharge  ENMT:  No difficulty hearing, tinnitus  NECK: No pain or stiffness  RESPIRATORY: No cough, wheezing,  CARDIOVASCULAR: No chest pain, palpitations, passing out, dizziness, or leg swelling  GASTROINTESTINAL:  No nausea, vomiting, diarrhea or constipation. No melena.  GENITOURINARY: No dysuria, hematuria  NEUROLOGICAL: No stroke like symptoms  SKIN: No burning or lesions   ENDOCRINE: No heat or cold intolerance  MUSCULOSKELETAL: No joint pain or swelling  PSYCHIATRIC: No  anxiety, mood swings  HEME/LYMPH: No bleeding gums  ALLERGY AND IMMUNOLOGIC: No hives or eczema	    All other ROS negative    PHYSICAL EXAM:  T(C): 36.7 (07-06-25 @ 11:00), Max: 36.8 (07-05-25 @ 23:00)  HR: 87 (07-06-25 @ 11:00) (75 - 112)  BP: 143/65 (07-06-25 @ 11:00) (106/51 - 174/72)  RR: 34 (07-06-25 @ 11:00) (18 - 34)  SpO2: 97% (07-06-25 @ 11:00) (91% - 100%)  Wt(kg): --  I&O's Summary    05 Jul 2025 07:01  -  06 Jul 2025 07:00  --------------------------------------------------------  IN: 1065 mL / OUT: 1700 mL / NET: -635 mL    06 Jul 2025 07:01  -  06 Jul 2025 14:44  --------------------------------------------------------  IN: 130 mL / OUT: 0 mL / NET: 130 mL        Appearance: Normal	  HEENT:   Normal oral mucosa, EOMI	  Cardiovascular:  S1 S2, No JVD,    Respiratory: Lungs clear to auscultation	  Psychiatry: Alert  Gastrointestinal:  Soft, Non-tender, + BS	  Skin: No rashes   Neurologic: Non-focal  Extremities:  No edema  Vascular: Peripheral pulses palpable    	    	  	  CARDIAC MARKERS:  Labs personally reviewed by me                                  11.7   8.75  )-----------( 150      ( 06 Jul 2025 04:56 )             36.2     07-06    135  |  102  |  19  ----------------------------<  133[H]  4.4   |  18[L]  |  0.57    Ca    9.0      06 Jul 2025 04:56  Phos  3.4     07-06  Mg     2.2     07-06    TPro  7.3  /  Alb  4.2  /  TBili  0.6  /  DBili  x   /  AST  19  /  ALT  12  /  AlkPhos  66  07-05          EKG: Personally reviewed by me - SB 57 bpm   Radiology: Personally reviewed by me -     < from: CT Head No Cont (07.05.25 @ 13:53) >  CT HEAD:    No new or increasing intracranial hemorrhage since this morning's scan at   outside hospital (separate record number, accession #[id number]).    Left thalamic and corona radiata hemorrhage with intraventricular   extension. Stable ventricular size but close follow-up advised.      CTA BRAIN:  No arterial occlusion or significant stenosis. No aneurysm or other   arterial source for the left parenchymal hemorrhage.    CTA NECK:  No arterial steno-occlusive disease or evidence of dissection.      < from: CT Head No Cont (07.05.25 @ 21:29) >  IMPRESSION:  Grossly unchanged left thalamic and corona radiata hemorrhage with   intraventricular extension.      < from: CT Head No Cont (07.06.25 @ 09:22) >    IMPRESSION:    No significant interval change from CT brain 7/5/2025.      < from: VA Duplex Lower Ext Vein Scan, Bilat (07.05.25 @ 23:09) >  IMPRESSION:  No evidence of deep venous thrombosis in either lower extremity.      < end of copied text >        Assessment /Plan:      84F on ASA81, PMHx HTN, HLD pt found down, no external signs of trauma @ CTH w/Lt BG IPH, IVH , DDAVP + keppra given at .    Problem 1: ICH  - CT head 7/5 with Left thalamic and corona radiata hemorrhage with intraventricular extension  - Interval CTH stable hemorrhage, CTA head and neck negative  - Appreciate NSICU and Neurosurgery recs  - Seizure ppx: Keppra 500mg BID x 7 days (End date: 7/11/2025)    Problem 2: LE edema  - TTE (OP) 5/2025 - EF 65-70%, mild grade 1 DD, LA mod dilated, lipomatous intratrial septum, ascending aorta 3.8cm, mod-severe TR, mod MR, calcified mitral valve with prolapse of posterior mitral leaflet, mild-mod AR and FL  -- findings stable compared to TTE 2/2024  - Duplex US LE 5/2025 - no evidence of DVT  - Duplex US LE 7/5/25 - no evidence of DVT  - repeat TTE pending    Problem 3: HTN  - At home on candesartan 32 mg, metoprolol 50mg BID & Lasix 20mg   - In hospital, c/w Coreg 12.5mg BID, Losartan 100mg QD    Problem 4: HLD  - On simvastatin 10mg QD at home  - In hospital, c/w Lipitor 10mg     Follows OP cardio Dr. Kartik Perry        Differential diagnosis and plan of care discussed with patient after the evaluation. Counseling on diet, nutritional counseling, weight management, exercise and medication compliance was done.   Advanced care planning/advanced directives discussed with patient/family. DNR status including forceful chest compressions to attempt to restart the heart, ventilator support/artificial breathing, electric shock, artificial nutrition, health care proxy, Molst form all discussed with pt. Full code. Sixteen minutes spent on discussing advanced directives.       MYRON Fleming,  [facility]  Cardiovascular Medicine  [address] [place] [address] Dr, [address]  Available for call or text via [employer]  Office [phone]

## 2025-07-06 NOTE — PHYSICAL THERAPY INITIAL EVALUATION ADULT - GAIT DEVIATIONS NOTED, PT EVAL
decreased emma/increased time in double stance/decreased step length/decreased stride length/decreased weight-shifting ability

## 2025-07-06 NOTE — OCCUPATIONAL THERAPY INITIAL EVALUATION ADULT - PERTINENT HX OF CURRENT PROBLEM, REHAB EVAL
Pt is an 85yo F with history of HTN, HLD, and hypothyroidism originally presented to  after being found down by family transferred to [facility] for higher level of care of [facility] IPH with IVH. Pt had no obvious signs of trauma per documentation. Pt received DDAVP for ASA use.   CTH 7/5/25: Grossly unchanged left thalamic and corona radiata hemorrhage with intraventricular extension. (-) Head/Neck CTA 7/5/25. (-) [facility] Duplex BLE Vein Scan 7/5/25.

## 2025-07-06 NOTE — PROGRESS NOTE ADULT - SUBJECTIVE AND OBJECTIVE BOX
Patient seen and examined at bedside.    --Anticoagulation--    T(C): 36.7 (07-05-25 @ 19:00), Max: 36.7 (07-05-25 @ 19:00)  HR: 91 (07-05-25 @ 21:45) (62 - 112)  BP: 110/51 (07-05-25 @ 21:45) (110/51 - 184/78)  RR: 28 (07-05-25 @ 21:45) (16 - 28)  SpO2: 92% (07-05-25 @ 21:45) (92% - 100%)  Wt(kg): --    Exam: EOV, Ox0-1, R facial, R drift, dysarthric, L side strong, RUE 3/5 RLE 4+/5

## 2025-07-06 NOTE — SPEECH LANGUAGE PATHOLOGY EVALUATION - SLP AUTOMATIC SPEECH
Pt required cue to initiate sequence "January" and required cues for all months from August-December./impaired/months of the year

## 2025-07-06 NOTE — PHYSICAL THERAPY INITIAL EVALUATION ADULT - IMPAIRED TRANSFERS: SIT/STAND, REHAB EVAL
impaired balance/decreased flexibility/impaired motor control/narrow base of support/impaired postural control/decreased strength
Private car

## 2025-07-06 NOTE — PROGRESS NOTE ADULT - SUBJECTIVE AND OBJECTIVE BOX
HPI:  Pt is an 83yo F with history of HTN, HLD, and hypothyroidism originally presented to  after being found down by family transferred to [facility] for higher level of care of [facility] with IVH. Pt had no obvious signs of trauma per documentation. Pt received DDAVP for ASA use.     Overnight: pt lethargic. slurring words, and minimal movement on LUE, CT head performed demonstrating grossly normal    Admission Scores  GCS: 14 ICH: 2    HOSPITAL COURSE:    Allergies    No Known Allergies    Intolerances    REVIEW OF SYSTEMS: [ ] Unable to Assess due to neurologic exam   [ x] All ROS addressed below are non-contributory, except:  Neuro: [ ] Headache [ ] Back pain [ ] Numbness [x] Weakness [ ] Ataxia [ ] Dizziness [ ] Aphasia [ ] Dysarthria [ ] Visual disturbance  Resp: [ ] Shortness of breath/dyspnea, [ ] Orthopnea [ ] Cough  CV: [ ] Chest pain [ ] Palpitation [ ] Lightheadedness [ ] Syncope  Renal: [ ] Thirst [ ] Edema  GI: [ ] Nausea [ ] Emesis [ ] Abdominal pain [ ] Constipation [ ] Diarrhea  Hem: [ ] Hematemesis [ ] bright red blood per rectum  ID: [ ] Fever [ ] Chills [ ] Dysuria  ENT: [ ] Rhinorrhea      DEVICES:   [ ] Restraints [ ] ET tube [ ] central line [ ] arterial line [ ] tamez [ ] NGT/OGT [ ] EVD [ ] LD [ ] DEVON/HMV [ ] Trach [ ] PEG [ ] Chest Tube     EXAMINATION:  PHYSICAL EXAM:    Constitutional: No Acute Distress   Neurological:EOV, oriented to self and place, following commands, PERRL, EOMI, No Gaze Preference, Right facial, dysarthric, RUE 3/5, LUE 5/5, BLE 5/5  Pulmonary: No rales, No rhonchi, No wheezes   Cardiovascular: Regular rate and rhythm   Gastrointestinal: Soft, Non-tender, Non-distended   Extremities:     -----------------------------------------------------------------------------------------------------  ICU Vital Signs Last 24 Hrs  T(C): 36.8 (05 Jul 2025 23:00), Max: 36.8 (05 Jul 2025 23:00)  T(F): 98.2 (05 Jul 2025 23:00), Max: 98.2 (05 Jul 2025 23:00)  HR: 94 (06 Jul 2025 01:00) (62 - 112)  BP: 116/60 (06 Jul 2025 01:00) (106/51 - 184/78)  BP(mean): 77 (06 Jul 2025 01:00) (73 - 104)  ABP: --  ABP(mean): --  RR: 22 (06 Jul 2025 01:00) (16 - 28)  SpO2: 92% (06 Jul 2025 01:00) (91% - 100%)    O2 Parameters below as of 05 Jul 2025 19:00  Patient On (Oxygen Delivery Method): room air            I&O's Summary    05 Jul 2025 07:01  -  06 Jul 2025 03:08  --------------------------------------------------------  IN: 380 mL / OUT: 900 mL / NET: -520 mL        MEDICATIONS  (STANDING):  atorvastatin 10 milliGRAM(s) Oral at bedtime  calcium carbonate    500 mG (Tums) Chewable 1 Tablet(s) Chew daily  carvedilol 25 milliGRAM(s) Oral every 12 hours  chlorhexidine 4% Liquid 1 Application(s) Topical daily  cholecalciferol 2000 Unit(s) Oral daily  furosemide    Tablet 20 milliGRAM(s) Oral daily  levothyroxine 88 MICROGram(s) Oral daily  losartan 100 milliGRAM(s) Oral daily  niCARdipine Infusion 5 mG/Hr (25 mL/Hr) IV Continuous <Continuous>  polyethylene glycol 3350 17 Gram(s) Oral daily  sodium chloride 0.9%. 500 milliLiter(s) (65 mL/Hr) IV Continuous <Continuous>      RESPIRATORY:      IMAGING:   Recent imaging studies were reviewed.    LAB RESULTS:                          12.3   10.35 )-----------( 138      ( 05 Jul 2025 15:31 )             37.2       PT/INR - ( 05 Jul 2025 15:32 )   PT: 12.4 sec;   INR: 1.08 ratio         PTT - ( 05 Jul 2025 15:32 )  PTT:29.4 sec    07-05    137  |  99  |  20  ----------------------------<  115[H]  3.6   |  21[L]  |  0.67    Ca    8.9      05 Jul 2025 15:31  Phos  3.2     07-05  Mg     1.7     07-05    TPro  7.3  /  Alb  4.2  /  TBili  0.6  /  DBili  x   /  AST  19  /  ALT  12  /  AlkPhos  66  07-05    -----------------------------------------------------------------------------------------------------------------------------------------------------------------------------------

## 2025-07-06 NOTE — PHYSICAL THERAPY INITIAL EVALUATION ADULT - MANUAL MUSCLE TESTING RESULTS, REHAB EVAL
Strength is grossly at least 4/5 throughout on LUE/LLE, 3/5 on RLE, 2/5 on RUE. Pt with trigger finger on R middle finger/grossly assessed due to

## 2025-07-06 NOTE — SWALLOW FEES ASSESSMENT ADULT - DIAGNOSTIC IMPRESSIONS
Patient presents with oropharyngeal swallow skills that are within functional limits. Noted with adequate mastication of solids, timely oral transit, trace post swallow pharyngeal residue that clears spontaneously with repeat swallows, intermittent spillage into the pharynx with thin liquids which is judged to be a variant of normal, and no evidence of laryngeal penetration/aspiration on this exam.

## 2025-07-06 NOTE — SWALLOW BEDSIDE ASSESSMENT ADULT - SWALLOW EVAL: REHAB POTENTIAL
Quality 226: Preventive Care And Screening: Tobacco Use: Screening And Cessation Intervention: Patient screened for tobacco use and is an ex/non-smoker
Quality 47: Advance Care Plan: Advance Care Planning discussed and documented; advance care plan or surrogate decision maker documented in the medical record.
Detail Level: Detailed
Quality 130: Documentation Of Current Medications In The Medical Record: Current Medications Documented
good, to achieve stated therapy goals

## 2025-07-06 NOTE — OCCUPATIONAL THERAPY INITIAL EVALUATION ADULT - BALANCE TRAINING, PT EVAL
GOAL: Patient will improve seated dynamic balance by 1 grade within 4 weeks. GOAL: Patient will improve standing dynamic balance by 1 grade within 4 weeks.

## 2025-07-06 NOTE — OCCUPATIONAL THERAPY INITIAL EVALUATION ADULT - NSOTDMEREC_GEN_A_CORE
If home, Pt will need assist with all ADL and functional mobility, will require RW for MRADL, poly fly w/c, 3:1 commode Pt will require 3:1 commode due to Pt confined to single room with no access to bathroom, shower chair

## 2025-07-06 NOTE — SWALLOW FEES ASSESSMENT ADULT - COMMENTS
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    CTH 7/5: "Left thalamic and corona radiata hemorrhage with intraventricular extension. Stable ventricular size but close follow-up advised."  CTA BRAIN 7/5: "No arterial occlusion or significant stenosis. No aneurysm or other arterial source for the left parenchymal hemorrhage."

## 2025-07-06 NOTE — OCCUPATIONAL THERAPY INITIAL EVALUATION ADULT - NAME OF CLINICIAN
HISTORY    The patient is a 19 year old male who comes in complaining of cough for the past 10 days. The cough was initially productive of green sputum, but is now dry. It is worse at night and is progressively getting worse. The cough is violent and comes in spasms. His chest hurts but does not feel tight. He has not been wheezing. There has been no fever. He was seen in urgent care and given an albuterol inhaler which has not helped. He denies exposure to anyone with pertussis.    ALLERGIES:   Allergen Reactions   • Penicillins RASH       Outpatient Prescriptions Marked as Taking for the 5/24/17 encounter (Office Visit) with Jannette Klein MD   Medication Sig Dispense Refill   • albuterol 108 (90 Base) MCG/ACT inhaler Inhale 2 puffs into the lungs every 4 hours as needed for Shortness of Breath or Wheezing (Persistent cough). 1 Inhaler 0   • amphetamine-dextroamphetamine (ADDERALL) 20 MG tablet Take 1/2 tablet every morning and 1/2 tablet at noon. 30 tablet 0   • Multiple Vitamin (MULTI-VITAMIN DAILY PO) Take 1 tablet by mouth daily.           Tobacco Use: Never           ROS: negative for constitutional, pulmonary, except as noted above in history of present illness.    PHYSICAL EXAM  Vitals: Blood pressure 118/80, temperature 98.3 °F (36.8 °C), temperature source Oral, height 5' 8\" (1.727 m), weight 67.1 kg.   Wt Readings from Last 2 Encounters:   05/24/17 67.1 kg (59 %, Z= 0.23)*   05/20/17 67.9 kg (62 %, Z= 0.30)*     * Growth percentiles are based on CDC 2-20 Years data.     General: Well-nourished, well-developed, in no acute distress. HEENT: Oropharynx without erythema or exudate. Neck: supple, no adenopathy. Heart: RRR without murmur. Lungs: CTA, no rales or wheezes. Skin: Warm and dry; no rashes.     ASSESSMENT/PLAN    Acute bacterial bronchitis  -     benzonatate (TESSALON PERLES) 200 MG capsule; Take 1 capsule by mouth 3 times daily as needed for Cough.  -     azithromycin (ZITHROMAX Z-SOILA) 250 MG  tablet; 2 tablets day one, then 1 tablet days 2-5    Jannette Klein MD       OUMOU davey

## 2025-07-06 NOTE — SWALLOW FEES ASSESSMENT ADULT - ORAL PHASE COMMENTS
WFL intermittent spillage to the lateral channels and pyriform sinus  other times, no spillage appreciated

## 2025-07-06 NOTE — PHYSICAL THERAPY INITIAL EVALUATION ADULT - ADDITIONAL COMMENTS
Pt lives with her  in a house with 3-4 steps to enter from front entrance with no handrail and 3-4 steps to enter from back entrance, +HR. Pt also has a flight of stairs to the basement. Pt assists her  with ADLs. +drives. +reading eyeglasses.

## 2025-07-06 NOTE — SPEECH LANGUAGE PATHOLOGY EVALUATION - YES/NO QUESTIONS: COMPLEX
Pt required repetitions to answer simple and complex y/n questions. Dtr providing cueing in Hungarian./no

## 2025-07-06 NOTE — OCCUPATIONAL THERAPY INITIAL EVALUATION ADULT - RANGE OF MOTION EXAMINATION, UPPER EXTREMITY
Hx of trigger finger of 3rd digit R hand/bilateral UE Active ROM was WFL  (within functional limits)

## 2025-07-06 NOTE — SPEECH LANGUAGE PATHOLOGY EVALUATION - SLP CONVERSATIONAL SPEECH
Fluent speech output. Pt conversing with dtr in [place] Islander (Dtr preferred to translate). Dtr reported pt is communicating adequately during conversation

## 2025-07-06 NOTE — SPEECH LANGUAGE PATHOLOGY EVALUATION - SLP GENERAL OBSERVATIONS
Pt received upright in chair, AAOx2, O2 96% on RA. Dtr at bedside preferred to translate to Afghan for this visit

## 2025-07-06 NOTE — OCCUPATIONAL THERAPY INITIAL EVALUATION ADULT - ADL RETRAINING, OT EVAL
GOAL: Patient will perform LB dressing with independence by 4 weeks. GOAL: Patient will perform UB dressing with independence by 4 weeks.

## 2025-07-06 NOTE — OCCUPATIONAL THERAPY INITIAL EVALUATION ADULT - ADDITIONAL COMMENTS
Pt lives in a private home with her , 2-3 steps to enter and main level set up. independent with ADLs prior to admission, ambulated independently with no assistive devices; helped take care of her , drives, IADLs all independent. Denies DME in home

## 2025-07-06 NOTE — PROGRESS NOTE ADULT - ASSESSMENT
ASSESSMENT/PLAN: 84F on ASA s/p DDAVP s/p fall with Left basal ganglia hemorrhage w/ IVH    NEURO:  Neuro/vitals q1  Interval CTH stable hemorrhage, CTA head and neck negative  CTH in AM  Hydrowatch  MRI ordered  Stroke following  Seizure ppx: Keppra 500mg BID x 7 days (End date: 7/11/2025)  Pain: tylenol PRN   Activity: [x] OOB as tolerated [] Bedrest [x] PT [x] OT [] PMNR    PULM:  Incentive spirometry, mobilize as tolerated  SpO2 >92%, on room air    CV:  -140mmHg  Cardene gtt   Lipid panel pending  TTE pending  Hold Aspirin, ARU pending   continue home atorvastatin 10mg at bedtime  c/w Coreg 25 BID  c/w Losartan 100 daily    RENAL:  NS 65cc/h  Hold home Lasix    GI:  Diet: Dysphagia screen and then advance diet as tolerated  senna and miralax for bowel regimen  Last Bowel Movement: PTA  GI prophylaxis [x] not indicated [] PPI [] other:    ENDO:   Goal euglycemia (-180)  continue home synthroid 88mcg daily  a1c and thyroid panel pending    HEME/ONC:  VTE prophylaxis: [x] SCDs [x] hold chemoprophylaxis due to: bleed day 0 of hemorrhage  LED-p    ID:  afebrile    MISC:    SOCIAL/FAMILY:  [ ] awaiting [x] updated at bedside [] family meeting    CODE STATUS:  [x] Full Code [] DNR [] DNI [] Palliative/Comfort Care    DISPOSITION:  [x] ICU [] Stroke Unit [] Floor [] EMU [] RCU [] PCU    [x] Patient is at high risk of neurologic deterioration/death due to: thalamic hemorrhage, intraventricular hemorrhage       Contact: [phone]

## 2025-07-06 NOTE — PROGRESS NOTE ADULT - ASSESSMENT
very close monitoring, q1h neurochecks in NSCU  CTH reviewed and was grossly stable  -140  CTH in AM, EVD in the event of further decline

## 2025-07-06 NOTE — PHYSICAL THERAPY INITIAL EVALUATION ADULT - BALANCE DISTURBANCE, IDENTIFIED IMPAIRMENT CONTRIBUTE, REHAB EVAL
After Visit Summary   7/5/2017    Karmen Santos    MRN: [id number]           Patient Information     Date Of Birth          1963        Visit Information        Provider Department      7/5/2017 4:30 PM RI IM NURSE [facility]        Today's Diagnoses     Encounter for immunization    -  1       Follow-ups after your visit        Who to contact     If you have questions or need follow up information about today's clinic visit or your schedule please contact [facility] directly at [phone].  Normal or non-critical lab and imaging results will be communicated to you by [email or web address], letter or phone within 4 business days after the clinic has received the results. If you do not hear from us within 7 days, please contact the clinic through [email or web address] or phone. If you have a critical or abnormal lab result, we will notify you by phone as soon as possible.  Submit refill requests through [employer] or call your pharmacy and they will forward the refill request to us. Please allow 3 business days for your refill to be completed.          Additional Information About Your Visit        MyChart Information     [employer] gives you secure access to your electronic health record. If you see a primary care provider, you can also send messages to your care team and make appointments. If you have questions, please call your primary care clinic.  If you do not have a primary care provider, please call [phone] and they will assist you.        Care EveryWhere ID     This is your Care EveryWhere ID. This could be used by other organizations to access your [place] medical records  [id number]         Blood Pressure from Last 3 Encounters:   05/08/17 106/80   01/31/17 124/82   04/14/16 112/78    Weight from Last 3 Encounters:   05/08/17 144 lb 3.2 oz (65.4 kg)   01/31/17 142 lb (64.4 kg)   04/14/16 177 lb 14.4 oz (80.7 kg)              We Performed the Following     HEPATITIS B  VACCINE, ADULT, IM        Primary Care Provider Office Phone # Fax #    Belkys Murphy [phone]       [facility] [address] [facility] [address]  [facility] [address]        Equal Access to Services     FELICIANO CARRERO : Hadii gulshan ku hadshelbyo Soomaali, waaxda luqadaha, qaybta kaalmada adeegyada, waxdeidra arianein hayaan jv carolyn meagan ortega. So [facility] [phone].    ATENCIÓN: Si habla español, tiene a rivero disposición servicios gratuitos de asistencia lingüística. Llame al [phone].    We comply with applicable federal civil rights laws and [place] laws. We do not discriminate on the basis of race, color, national origin, age, disability sex, sexual orientation or gender identity.            Thank you!     Thank you for choosing [facility]  for your care. Our goal is always to provide you with excellent care. Hearing back from our patients is one way we can continue to improve our services. Please take a few minutes to complete the written survey that you may receive in the mail after your visit with us. Thank you!             Your Updated Medication List - Protect others around you: Learn how to safely use, store and throw away your medicines at www.disposemymeds.org.          This list is accurate as of: 7/5/17  5:06 PM.  Always use your most recent med list.                   Brand Name Dispense Instructions for use Diagnosis    acetaminophen-caffeine 500-65 MG Tabs    EXCEDRIN TENSION HEADACHE     Take 2 tablets by mouth as needed for mild pain        acyclovir 400 MG tablet    ZOVIRAX    30 tablet    Take 1 tablet (400 mg) by mouth 3 times daily For cold sores only    Recurrent herpes labialis       ascorbic acid 500 MG tablet    VITAMIN C     Take 1,000 mg by mouth 2 times daily        BIOTIN MAXIMUM STRENGTH 5 MG Caps   Generic drug:  biotin     30 capsule    Take 10,000 mcg by mouth daily        cetirizine 10 MG tablet    zyrTEC    30 tablet    Take 1 tablet (10  mg) by mouth every evening        cholecalciferol 1000 UNIT tablet    vitamin D    100 tablet    Take 2,000 Units by mouth daily        ibuprofen 600 MG tablet    ADVIL/MOTRIN    30 tablet    Take 1 tablet (600 mg) by mouth every 6 hours as needed for pain (mild)    Lipoma of flank       lisinopril-hydrochlorothiazide 10-12.5 MG per tablet    PRINZIDE/ZESTORETIC    30 tablet    TAKE 1 TABLET BY MOUTH DAILY    Essential hypertension       MINOCYCLINE HCL PO      Take 50 mg by mouth daily        propranolol 20 MG tablet    INDERAL    180 tablet    Take 1 tablet (20 mg) by mouth 2 times daily    Migraine without status migrainosus, not intractable, unspecified migraine type       SUMAtriptan 50 MG tablet    IMITREX    9 tablet    Take 1 tablet (50 mg) by mouth at onset of headache for migraine May repeat dose in 2 hours.  Do not exceed 200 mg in 24 hours    Migraine without status migrainosus, not intractable, unspecified migraine type       vitamin B complex with vitamin C Tabs tablet      Take 2 tablets by mouth daily           impaired coordination/impaired motor control/impaired postural control/decreased strength

## 2025-07-06 NOTE — OCCUPATIONAL THERAPY INITIAL EVALUATION ADULT - DIAGNOSIS, OT EVAL
decreased functional activity endurance, decreased strength, Impaired balance, impaired coordination, aphasic, impacting ability to perform ADL and functional mobility. decreased functional activity endurance, decreased strength, R side inattention, Impaired balance, impaired coordination, aphasic, impacting ability to perform ADL and functional mobility.

## 2025-07-06 NOTE — SPEECH LANGUAGE PATHOLOGY EVALUATION - SLP DIAGNOSIS
CTH finding of 3 cm acute parenchymal hemorrhage in the left corona radiata and thalamus with intraventricular hemorrhage and bilateral lateral 3rd and 4th ventricles. Pt p/w Aphasia marked by mixed receptive and expressive language deficits (expressive > receptive). Receptive language marked by difficulties answering simple y/n questions. Pt followed 1 to 3-step commands independenely. Expressively, pt p/w difficulties stating automatic sequences and word finding difficulties during structured tasks. Cognitive-linguistically, pt p/w difficulties orienting to time; however, dtr reported pt had difficulties knowing the date PTA. Speech skills grossly WFLs.

## 2025-07-06 NOTE — SWALLOW BEDSIDE ASSESSMENT ADULT - H & P REVIEW
85 yo female with PMH of HTN, HLD, hypothyroidism on aspirin presenting to emergency department as transfer from [place] due to CTH finding of 3 cm acute parenchymal hemorrhage in the left corona radiata and thalamus with intraventricular hemorrhage and bilateral lateral 3rd and 4th ventricles. As per family patient was found down in shower this morning, had right sided weakness and concern for possible facial droop, altered mental status. Currently patient is unable to provide further history due to AMS, history obtained from charts./yes

## 2025-07-06 NOTE — SWALLOW BEDSIDE ASSESSMENT ADULT - SLP PERTINENT HISTORY OF CURRENT PROBLEM
Not previously known to this service. Failed dysphagia screen per chart review. Also being followed for speech-language remediation

## 2025-07-06 NOTE — PROGRESS NOTE ADULT - ATTENDING COMMENTS
86yo woman adm for L thalamic hemorrhage with IVH, ICH score 2  CTA unremarkable  likely HTNsive vs. amyloid   on exam, alert, oriented to self, dysarthric, RUE/RLE at least AG, L 5/5  o/n c/f deteriorating exam and worsening R weakness, CTH unchanged  am CTH unchanged     vitals reviewed, afebrile  labs reviewed, Na 135  meds reviewed   off nicardipine gtt overnight    hydro watch, Q2 checks; Q4 overnight  delirium precaution, PT/OT OOB as tolerated   MRI wwo pending   stroke neurology consult  stroke core measures  tylenol prn for pain control  no sedation  -160  HTNsive cont home dose losartan 100mg, cont carvedilol 12.5mg Q12  cont lasix 20mg daily -->hold for now given limited PO intake   IVL   called outpt cardiologist, affiliated with [facility], aspiration precaution  O2sat>92%  s/s eval pending, ADAT   bowel regimen to start today  hypothyroid, cont home dose synthroid 88mcg, did not get morning dose due to lethargy, will give IV once  maintain euglycemia, A1C5.4  hold chemoppx ICH/IVH  daughter at bedside updated, plan discussed

## 2025-07-06 NOTE — OCCUPATIONAL THERAPY INITIAL EVALUATION ADULT - GENERAL OBSERVATIONS, REHAB EVAL
Upon entry, patient semi-supine in bed +IV +tele +pulse ox, Pt daughter (able to translate Macedonian as needed, Pt speaking both english/Macedonian) present at bedside, patient agreeable to OT eval, cleared for OT evaluation as per OUMOU davey
Declines

## 2025-07-06 NOTE — SPEECH LANGUAGE PATHOLOGY EVALUATION - H & P REVIEW
83 yo female with PMH of HTN, HLD, hypothyroidism on aspirin presenting to emergency department as transfer from [place] due to CTH finding of 3 cm acute parenchymal hemorrhage in the left corona radiata and thalamus with intraventricular hemorrhage and bilateral lateral 3rd and 4th ventricles. As per family patient was found down in shower this morning, had right sided weakness and concern for possible facial droop, altered mental status. Currently patient is unable to provide further history due to AMS, history obtained from charts./yes

## 2025-07-06 NOTE — SWALLOW BEDSIDE ASSESSMENT ADULT - SWALLOW EVAL: DIAGNOSIS
CTH finding of 3 cm acute parenchymal hemorrhage in the left corona radiata and thalamus with intraventricular hemorrhage and bilateral lateral 3rd and 4th ventricles. Pt p/w suspected pharyngeal dysphagia. Oral phase of swallow marked by slow yet functional mastication. Pharyngeal swallow suspected to be delayed with reduced hyolaryngeal elevation/excursion upon palpation. 3-4 swallows per intake suggestive of pharyngeal stasis. Intermittent wet vocal quality post swallow of thin liquids, suggestive of laryngeal penetration/aspiration

## 2025-07-06 NOTE — SWALLOW BEDSIDE ASSESSMENT ADULT - SLP GENERAL OBSERVATIONS
Pt received upright in chair, AAOx2, O2 96% on RA. Dtr at bedside preferred to translate to Irish for this visit

## 2025-07-06 NOTE — SWALLOW FEES ASSESSMENT ADULT - H & P REVIEW
83 yo female with PMH of HTN, HLD, hypothyroidism on aspirin presenting to emergency department as transfer from [place] due to CTH finding of 3 cm acute parenchymal hemorrhage in the left corona radiata and thalamus with intraventricular hemorrhage and bilateral lateral 3rd and 4th ventricles. As per family patient was found down in shower this morning, had right sided weakness and concern for possible facial droop, altered mental status. Currently patient is unable to provide further history due to AMS, history obtained from charts./yes

## 2025-07-07 ENCOUNTER — RESULT REVIEW (OUTPATIENT)
Age: 85
End: 2025-07-07

## 2025-07-07 PROCEDURE — 93306 TTE W/DOPPLER COMPLETE: CPT | Mod: 26

## 2025-07-07 PROCEDURE — 99222 1ST HOSP IP/OBS MODERATE 55: CPT

## 2025-07-07 PROCEDURE — 70553 MRI BRAIN STEM W/O & W/DYE: CPT | Mod: 26

## 2025-07-07 PROCEDURE — 99232 SBSQ HOSP IP/OBS MODERATE 35: CPT

## 2025-07-07 RX ORDER — CARVEDILOL 3.12 MG/1
25 TABLET, FILM COATED ORAL EVERY 12 HOURS
Refills: 0 | Status: DISCONTINUED | OUTPATIENT
Start: 2025-07-07 | End: 2025-07-14

## 2025-07-07 RX ORDER — LORAZEPAM 4 MG/ML
1 VIAL (ML) INJECTION ONCE
Refills: 0 | Status: DISCONTINUED | OUTPATIENT
Start: 2025-07-07 | End: 2025-07-07

## 2025-07-07 RX ORDER — ENOXAPARIN SODIUM 100 MG/ML
40 INJECTION SUBCUTANEOUS
Refills: 0 | Status: DISCONTINUED | OUTPATIENT
Start: 2025-07-07 | End: 2025-07-14

## 2025-07-07 RX ORDER — FUROSEMIDE 10 MG/ML
20 INJECTION INTRAMUSCULAR; INTRAVENOUS DAILY
Refills: 0 | Status: DISCONTINUED | OUTPATIENT
Start: 2025-07-07 | End: 2025-07-08

## 2025-07-07 RX ORDER — CARVEDILOL 3.12 MG/1
12.5 TABLET, FILM COATED ORAL ONCE
Refills: 0 | Status: COMPLETED | OUTPATIENT
Start: 2025-07-07 | End: 2025-07-07

## 2025-07-07 RX ADMIN — CARVEDILOL 12.5 MILLIGRAM(S): 3.12 TABLET, FILM COATED ORAL at 09:01

## 2025-07-07 RX ADMIN — Medication 5 MILLIGRAM(S): at 03:07

## 2025-07-07 RX ADMIN — Medication 88 MICROGRAM(S): at 05:30

## 2025-07-07 RX ADMIN — Medication 1 APPLICATION(S): at 18:00

## 2025-07-07 RX ADMIN — Medication 5 MILLIGRAM(S): at 01:57

## 2025-07-07 RX ADMIN — Medication 1 MILLIGRAM(S): at 10:05

## 2025-07-07 RX ADMIN — CARVEDILOL 25 MILLIGRAM(S): 3.12 TABLET, FILM COATED ORAL at 17:21

## 2025-07-07 RX ADMIN — LOSARTAN POTASSIUM 100 MILLIGRAM(S): 100 TABLET, FILM COATED ORAL at 06:54

## 2025-07-07 RX ADMIN — CARVEDILOL 12.5 MILLIGRAM(S): 3.12 TABLET, FILM COATED ORAL at 06:54

## 2025-07-07 RX ADMIN — Medication 2 TABLET(S): at 21:20

## 2025-07-07 RX ADMIN — ATORVASTATIN CALCIUM 10 MILLIGRAM(S): 80 TABLET, FILM COATED ORAL at 21:19

## 2025-07-07 RX ADMIN — Medication 2000 UNIT(S): at 17:21

## 2025-07-07 RX ADMIN — FUROSEMIDE 20 MILLIGRAM(S): 10 INJECTION INTRAMUSCULAR; INTRAVENOUS at 09:01

## 2025-07-07 RX ADMIN — ENOXAPARIN SODIUM 40 MILLIGRAM(S): 100 INJECTION SUBCUTANEOUS at 17:22

## 2025-07-07 NOTE — PATIENT PROFILE ADULT - INTERPRETATION SERVICES DECLINED
Chief Complaint   Patient presents with   • Transitional Care Management     In hosptial on 11/20 - discharged 11/22/17; due to stomach issues. Patient informed me they could not pinpoint the issue. Did have neutropenia. Stomach feels good now.        Medications: medications verified and updated  Refills needed today?  NO  Denies known Latex allergy or symptoms of Latex sensitivity.  Patient would like communication of their results via:      Cell Phone:   Telephone Information:   Mobile [phone]     Okay to leave a message containing results? Yes  Tobacco history: verified      Health Maintenance Summary     Topic Due On Due Status Completed On Postpone Until Reason    Immunization-Zoster Feb 28[birth year] 1994 Postponed  Jun 14, 2018 Insurance or Financial    Immunization - Pneumococcal  Completed Jan 13, 2017      Medicare Wellness Visit Jan 13, 2018 Due Soon Jan 13, 2017      IMMUNIZATION - DTaP/Tdap/Td Sep 17, 2021 Not Due Sep 17, 2011      Immunization-Influenza Sep 1, 2017 Overdue Oct 31, 2016      Depression Screening Jan 13, 2018 Not Due Jan 13, 2017            Patient is due for topics as listed above, he wishes to decline at this time .        [facility] status addressed. Patient Inactive - Patient declined.         Patient/Caregiver requests family/friend to interpret.

## 2025-07-07 NOTE — DIETITIAN INITIAL EVALUATION ADULT - REASON FOR ADMISSION
IPH w/ IVH Pt is an 83 yo F with PMH: HTN, HLD, hypothyroidism. Presented to [facility] after being found down by family. Transferred to [facility] for higher level of care. 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

## 2025-07-07 NOTE — DIETITIAN INITIAL EVALUATION ADULT - PERTINENT MEDS FT
MEDICATIONS  (STANDING):  atorvastatin 10 milliGRAM(s) Oral at bedtime  calcium carbonate    500 mG (Tums) Chewable 1 Tablet(s) Chew daily  carvedilol 25 milliGRAM(s) Oral every 12 hours  chlorhexidine 4% Liquid 1 Application(s) Topical daily  cholecalciferol 2000 Unit(s) Oral daily  enoxaparin Injectable 40 milliGRAM(s) SubCutaneous <User Schedule>  furosemide    Tablet 20 milliGRAM(s) Oral daily  levothyroxine 88 MICROGram(s) Oral daily  losartan 100 milliGRAM(s) Oral daily  polyethylene glycol 3350 17 Gram(s) Oral daily    MEDICATIONS  (PRN):  acetaminophen     Tablet .. 650 milliGRAM(s) Oral every 6 hours PRN Temp greater or equal to 38.5C (101.3F), Mild Pain (1 - 3)  senna 2 Tablet(s) Oral at bedtime PRN Constipation

## 2025-07-07 NOTE — PATIENT PROFILE ADULT - FALL HARM RISK - HARM RISK INTERVENTIONS

## 2025-07-07 NOTE — PROGRESS NOTE ADULT - ATTENDING COMMENTS
83yo woman adm for L thalamic hemorrhage with IVH, ICH score 2  CTA unremarkable  likely HTNsive vs. amyloid   on exam, alert, oriented to self, dysarthric, RUE/RLE at least AG, L 5/5    HTNsive overnight, given IV pushes of hydralazine     vitals reviewed, afebrile  labs reviewed, Na 135  meds reviewed     hydro watch, Q2 checks; Q4 overnight  delirium precaution, PT/OT OOB as tolerated   MRI wwo pending   stroke neurology consult  stroke core measures  tylenol prn for pain control  no sedation  -160  HTNsive cont home dose losartan 100mg, increase carvedilol 12.5mg Q12  restart home dose lasix 20mg daily    IVL   cardiology following   RA, aspiration precaution  O2sat>92%  regular diet  LBM PTA, senna/miralax bid   hypothyroid, 88mcg synthroid   maintain euglycemia, A1C5.4  stable heme, start DVT ppx d/w neurosurgery  daughter at bedside updated, plan discussed  d/w neurosurgery/stroke neuro for downgrade to stroke unit  not critically ill 40min spent separate from teaching/procedures

## 2025-07-07 NOTE — PROGRESS NOTE ADULT - SUBJECTIVE AND OBJECTIVE BOX
DATE OF SERVICE: 07-07-25 @ 17:01    Patient is a 84y old  Female who presents with a chief complaint of IPH w/ IVH (07 Jul 2025 14:14)      INTERVAL HISTORY: in no acute distress, family at bedside    REVIEW OF SYSTEMS:  CONSTITUTIONAL: No weakness  EYES/ENT: No visual changes;  No throat pain   NECK: No pain or stiffness  RESPIRATORY: No cough, wheezing; No shortness of breath  CARDIOVASCULAR: No chest pain or palpitations  GASTROINTESTINAL: No abdominal  pain. No nausea, vomiting, or hematemesis  GENITOURINARY: No dysuria, frequency or hematuria  NEUROLOGICAL: No stroke like symptoms  SKIN: No rashes    TELEMETRY Personally reviewed: SR 70s-80s  	  MEDICATIONS:  carvedilol 25 milliGRAM(s) Oral every 12 hours  furosemide    Tablet 20 milliGRAM(s) Oral daily  losartan 100 milliGRAM(s) Oral daily        PHYSICAL EXAM:  T(C): 36.7 (07-07-25 @ 15:00), Max: 37.1 (07-07-25 @ 11:00)  HR: 77 (07-07-25 @ 16:00) (67 - 93)  BP: 153/79 (07-07-25 @ 16:00) (132/65 - 168/68)  RR: 29 (07-07-25 @ 16:00) (13 - 35)  SpO2: 94% (07-07-25 @ 16:00) (92% - 98%)  Wt(kg): --  I&O's Summary    06 Jul 2025 07:01  -  07 Jul 2025 07:00  --------------------------------------------------------  IN: 1090 mL / OUT: 1800 mL / NET: -710 mL    07 Jul 2025 07:01  -  07 Jul 2025 17:01  --------------------------------------------------------  IN: 0 mL / OUT: 900 mL / NET: -900 mL          Appearance: In no distress	  HEENT:    PERRL, EOMI	  Cardiovascular:  S1 S2, No JVD  Respiratory: Lungs clear to auscultation	  Gastrointestinal:  Soft, Non-tender, + BS	  Vascularature:  No edema of LE  Psychiatric: Appropriate affect   Neuro: no acute focal deficits                               11.7   8.75  )-----------( 150      ( 06 Jul 2025 04:56 )             36.2     07-06    135  |  102  |  19  ----------------------------<  133[H]  4.4   |  18[L]  |  0.57    Ca    9.0      06 Jul 2025 04:56  Phos  3.4     07-06  Mg     2.2     07-06          Labs personally reviewed      ASSESSMENT/PLAN: 	     84F on ASA81, PMHx HTN, HLD pt found down, no external signs of trauma @ CTH w/Lt BG IPH, IVH , DDAVP + keppra given at .    Problem 1: ICH  - CT head 7/5 with Left thalamic and corona radiata hemorrhage with intraventricular extension  - Interval CTH stable hemorrhage, CTA head and neck negative  - Appreciate NSICU and Neurosurgery recs  - Seizure ppx: Keppra 500mg BID x 7 days (End date: 7/11/2025)    Problem 2: LE edema  - TTE (OP) 5/2025 - EF 65-70%, mild grade 1 DD, LA mod dilated, lipomatous intratrial septum, ascending aorta 3.8cm, mod-severe TR, mod MR, calcified mitral valve with prolapse of posterior mitral leaflet, mild-mod AR and WA  -- findings stable compared to TTE 2/2024  - Duplex US LE 5/2025 - no evidence of DVT  - Duplex US LE 7/5/25 - no evidence of DVT  - TTE 7/7 EF 60%, no rmwa. Compared to prior echo, the posterior leaflet of the mitral valve is not proalpsing on this study (though it was on the prior). There is trace mitral regurgitation. There is decreased tricuspid regurgitation.    Problem 3: HTN  - At home on candesartan 32 mg, metoprolol 50mg BID & Lasix 20mg   - In hospital, c/w Coreg 12.5mg BID, Losartan 100mg QD    Problem 4: HLD  - On simvastatin 10mg QD at home  - In hospital, c/w Lipitor 10mg     Follows OP cardio Dr. Jeffrey Spivak Iolani Behrbom, AG-NP   Fahad Ibarra, DO [facility]  Cardiovascular Medicine  [address] [place] [address], [address]  Available through call or text on [employer]  Office: [phone]

## 2025-07-07 NOTE — PROGRESS NOTE ADULT - SUBJECTIVE AND OBJECTIVE BOX
HPI:  Pt is an 85yo F with history of HTN, HLD, and hypothyroidism originally presented to  after being found down by family transferred to [facility] for higher level of care of [facility] with IVH. Pt had no obvious signs of trauma per documentation. Pt received DDAVP for ASA use.     Overnight: pt lethargic. slurring words, and minimal movement on LUE, CT head performed demonstrating grossly normal    Admission Scores  GCS: 14 ICH: 2    HOSPITAL COURSE:    Allergies    No Known Allergies    Intolerances    REVIEW OF SYSTEMS: [ ] Unable to Assess due to neurologic exam   [ x] All ROS addressed below are non-contributory, except:  Neuro: [ ] Headache [ ] Back pain [ ] Numbness [x] Weakness [ ] Ataxia [ ] Dizziness [ ] Aphasia [ ] Dysarthria [ ] Visual disturbance  Resp: [ ] Shortness of breath/dyspnea, [ ] Orthopnea [ ] Cough  CV: [ ] Chest pain [ ] Palpitation [ ] Lightheadedness [ ] Syncope  Renal: [ ] Thirst [ ] Edema  GI: [ ] Nausea [ ] Emesis [ ] Abdominal pain [ ] Constipation [ ] Diarrhea  Hem: [ ] Hematemesis [ ] bright red blood per rectum  ID: [ ] Fever [ ] Chills [ ] Dysuria  ENT: [ ] Rhinorrhea      DEVICES:   [ ] Restraints [ ] ET tube [ ] central line [ ] arterial line [ ] tamez [ ] NGT/OGT [ ] EVD [ ] LD [ ] DEVON/HMV [ ] Trach [ ] PEG [ ] Chest Tube     EXAMINATION:  PHYSICAL EXAM:    Constitutional: No Acute Distress   Neurological:EOV, oriented to self and place, following commands, PERRL, EOMI, No Gaze Preference, Right facial, dysarthric, RUE 3/5, LUE 5/5, BLE 5/5  Pulmonary: No rales, No rhonchi, No wheezes   Cardiovascular: Regular rate and rhythm   Gastrointestinal: Soft, Non-tender, Non-distended   Extremities:     -----------------------------------------------------------------------------------------------------  ICU Vital Signs Last 24 Hrs  T(C): 36.8 (05 Jul 2025 23:00), Max: 36.8 (05 Jul 2025 23:00)  T(F): 98.2 (05 Jul 2025 23:00), Max: 98.2 (05 Jul 2025 23:00)  HR: 94 (06 Jul 2025 01:00) (62 - 112)  BP: 116/60 (06 Jul 2025 01:00) (106/51 - 184/78)  BP(mean): 77 (06 Jul 2025 01:00) (73 - 104)  ABP: --  ABP(mean): --  RR: 22 (06 Jul 2025 01:00) (16 - 28)  SpO2: 92% (06 Jul 2025 01:00) (91% - 100%)    O2 Parameters below as of 05 Jul 2025 19:00  Patient On (Oxygen Delivery Method): room air            I&O's Summary    05 Jul 2025 07:01  -  06 Jul 2025 03:08  --------------------------------------------------------  IN: 380 mL / OUT: 900 mL / NET: -520 mL        MEDICATIONS  (STANDING):  atorvastatin 10 milliGRAM(s) Oral at bedtime  calcium carbonate    500 mG (Tums) Chewable 1 Tablet(s) Chew daily  carvedilol 25 milliGRAM(s) Oral every 12 hours  chlorhexidine 4% Liquid 1 Application(s) Topical daily  cholecalciferol 2000 Unit(s) Oral daily  furosemide    Tablet 20 milliGRAM(s) Oral daily  levothyroxine 88 MICROGram(s) Oral daily  losartan 100 milliGRAM(s) Oral daily  niCARdipine Infusion 5 mG/Hr (25 mL/Hr) IV Continuous <Continuous>  polyethylene glycol 3350 17 Gram(s) Oral daily  sodium chloride 0.9%. 500 milliLiter(s) (65 mL/Hr) IV Continuous <Continuous>      RESPIRATORY:      IMAGING:   Recent imaging studies were reviewed.    LAB RESULTS:                          12.3   10.35 )-----------( 138      ( 05 Jul 2025 15:31 )             37.2       PT/INR - ( 05 Jul 2025 15:32 )   PT: 12.4 sec;   INR: 1.08 ratio         PTT - ( 05 Jul 2025 15:32 )  PTT:29.4 sec    07-05    137  |  99  |  20  ----------------------------<  115[H]  3.6   |  21[L]  |  0.67    Ca    8.9      05 Jul 2025 15:31  Phos  3.2     07-05  Mg     1.7     07-05    TPro  7.3  /  Alb  4.2  /  TBili  0.6  /  DBili  x   /  AST  19  /  ALT  12  /  AlkPhos  66  07-05    -----------------------------------------------------------------------------------------------------------------------------------------------------------------------------------   HPI:  Pt is an 83yo F with history of HTN, HLD, and hypothyroidism originally presented to  after being found down by family transferred to [facility] for higher level of care of [facility] with IVH. Pt had no obvious signs of trauma per documentation. Pt received DDAVP for ASA use.     Overnight: no overnight events noted.   : pt lethargic. slurring words, and minimal movement on LUE, CT head performed demonstrating grossly normal    Admission Scores  GCS: 14 ICH: 2    HOSPITAL COURSE:    Allergies    No Known Allergies    Intolerances    REVIEW OF SYSTEMS: [ ] Unable to Assess due to neurologic exam   [ x] All ROS addressed below are non-contributory, except:  Neuro: [ ] Headache [ ] Back pain [ ] Numbness [x] Weakness [ ] Ataxia [ ] Dizziness [ ] Aphasia [ ] Dysarthria [ ] Visual disturbance  Resp: [ ] Shortness of breath/dyspnea, [ ] Orthopnea [ ] Cough  CV: [ ] Chest pain [ ] Palpitation [ ] Lightheadedness [ ] Syncope  Renal: [ ] Thirst [ ] Edema  GI: [ ] Nausea [ ] Emesis [ ] Abdominal pain [ ] Constipation [ ] Diarrhea  Hem: [ ] Hematemesis [ ] bright red blood per rectum  ID: [ ] Fever [ ] Chills [ ] Dysuria  ENT: [ ] Rhinorrhea      DEVICES:   [ ] Restraints [ ] ET tube [ ] central line [ ] arterial line [ ] tamez [ ] NGT/OGT [ ] EVD [ ] LD [ ] DEVON/HMV [ ] Trach [ ] PEG [ ] Chest Tube     EXAMINATION:  PHYSICAL EXAM:    Constitutional: No Acute Distress   Neurological:  EOV, oriented to self and place, following commands,  PERRL, EOMI, No Gaze Preference, Right facial, dysarthric   RUE 3/5, LUE 5/5, BLE 5/5    Pulmonary: No rales, No rhonchi, No wheezes   Cardiovascular: Regular rate and rhythm   Gastrointestinal: Soft, Non-tender, Non-distended   Extremities:     ICU Vital Signs Last 24 Hrs  T(C): 36.8 (2025 03:00), Max: 36.8 (2025 07:00)  T(F): 98.2 (2025 03:00), Max: 98.3 (2025 07:00)  HR: 91 (2025 05:00) (75 - 91)  BP: 148/73 (2025 05:00) (120/57 - 168/68)  BP(mean): 101 (2025 05:00) (84 - 112)  ABP: --  ABP(mean): --  RR: 25 (2025 05:00) (20 - 36)  SpO2: 97% (2025 05:00) (92% - 98%)    O2 Parameters below as of 2025 19:00  Patient On (Oxygen Delivery Method): room air    MEDICATIONS  (STANDING):  atorvastatin 10 milliGRAM(s) Oral at bedtime  calcium carbonate    500 mG (Tums) Chewable 1 Tablet(s) Chew daily  carvedilol 12.5 milliGRAM(s) Oral every 12 hours  chlorhexidine 4% Liquid 1 Application(s) Topical daily  cholecalciferol 2000 Unit(s) Oral daily  levothyroxine 88 MICROGram(s) Oral daily  losartan 100 milliGRAM(s) Oral daily  polyethylene glycol 3350 17 Gram(s) Oral daily    MEDICATIONS  (PRN):  acetaminophen     Tablet .. 650 milliGRAM(s) Oral every 6 hours PRN Temp greater or equal to 38.5C (101.3F), Mild Pain (1 - 3)  senna 2 Tablet(s) Oral at bedtime PRN Constipation      IMAGING:   Recent imaging studies were reviewed.      LABS:                          11.7   8.75  )-----------( 150      ( 2025 04:56 )             36.2     07-06    135  |  102  |  19  ----------------------------<  133[H]  4.4   |  18[L]  |  0.57    Ca    9.0      2025 04:56  Phos  3.4     07-06  Mg     2.2     07-06    TPro  7.3  /  Alb  4.2  /  TBili  0.6  /  DBili  x   /  AST  19  /  ALT  12  /  AlkPhos  66  07-05    LIVER FUNCTIONS - ( 2025 15:31 )  Alb: 4.2 g/dL / Pro: 7.3 g/dL / ALK PHOS: 66 U/L / ALT: 12 U/L / AST: 19 U/L / GGT: x           PT/INR - ( 2025 15:32 )   PT: 12.4 sec;   INR: 1.08 ratio         PTT - ( 2025 15:32 )  PTT:29.4 sec  Urinalysis Basic - ( 2025 04:56 )    Color: Yellow / Appearance: Clear / S.028 / pH: x  Gluc: 133 mg/dL / Ketone: x  / Bili: Negative / Urobili: 0.2 mg/dL   Blood: x / Protein: Trace mg/dL / Nitrite: Negative   Leuk Esterase: Negative / RBC: 36 /HPF / WBC 2 /HPF   Sq Epi: x / Non Sq Epi: 1 /HPF / Bacteria: Negative /HPF         HPI:  Pt is an 83yo F with history of HTN, HLD, and hypothyroidism originally presented to  after being found down by family transferred to [facility] for higher level of care of [facility] with IVH. Pt had no obvious signs of trauma per documentation. Pt received DDAVP for ASA use.     Overnight: no overnight events noted.   : pt lethargic. slurring words, and minimal movement on LUE, CT head performed demonstrating grossly normal    Admission Scores  GCS: 14 ICH: 2    HOSPITAL COURSE:    Allergies    No Known Allergies    Intolerances    REVIEW OF SYSTEMS: [ ] Unable to Assess due to neurologic exam   [ x] All ROS addressed below are non-contributory, except:  Neuro: [ ] Headache [ ] Back pain [ ] Numbness [x] Weakness [ ] Ataxia [ ] Dizziness [ ] Aphasia [ ] Dysarthria [ ] Visual disturbance  Resp: [ ] Shortness of breath/dyspnea, [ ] Orthopnea [ ] Cough  CV: [ ] Chest pain [ ] Palpitation [ ] Lightheadedness [ ] Syncope  Renal: [ ] Thirst [ ] Edema  GI: [ ] Nausea [ ] Emesis [ ] Abdominal pain [ ] Constipation [ ] Diarrhea  Hem: [ ] Hematemesis [ ] bright red blood per rectum  ID: [ ] Fever [ ] Chills [ ] Dysuria  ENT: [ ] Rhinorrhea      DEVICES:   [ ] Restraints [ ] ET tube [ ] central line [ ] arterial line [ ] tamez [ ] NGT/OGT [ ] EVD [ ] LD [ ] DEVON/HMV [ ] Trach [ ] PEG [ ] Chest Tube     EXAMINATION:  PHYSICAL EXAM:    Constitutional: No Acute Distress   Neurological:  EOV, oriented to self and place, following commands,  PERRL, EOMI, No Gaze Preference, Right facial, dysarthric   RUE 3/5, LUE 5/5, BLE 5/5    Pulmonary: No rales, No rhonchi, No wheezes   Cardiovascular: Regular rate and rhythm   Gastrointestinal: Soft, Non-tender, Non-distended   Extremities:     ICU Vital Signs Last 24 Hrs  T(C): 36.8 (2025 03:00), Max: 36.8 (2025 07:00)  T(F): 98.2 (2025 03:00), Max: 98.3 (2025 07:00)  HR: 91 (2025 05:00) (75 - 91)  BP: 148/73 (2025 05:00) (120/57 - 168/68)  BP(mean): 101 (2025 05:00) (84 - 112)  ABP: --  ABP(mean): --  RR: 25 (2025 05:00) (20 - 36)  SpO2: 97% (2025 05:00) (92% - 98%)    O2 Parameters below as of 2025 19:00  Patient On (Oxygen Delivery Method): room air    MEDICATIONS  (STANDING):  atorvastatin 10 milliGRAM(s) Oral at bedtime  calcium carbonate    500 mG (Tums) Chewable 1 Tablet(s) Chew daily  carvedilol 12.5 milliGRAM(s) Oral every 12 hours  chlorhexidine 4% Liquid 1 Application(s) Topical daily  cholecalciferol 2000 Unit(s) Oral daily  levothyroxine 88 MICROGram(s) Oral daily  losartan 100 milliGRAM(s) Oral daily  polyethylene glycol 3350 17 Gram(s) Oral daily    MEDICATIONS  (PRN):  acetaminophen     Tablet .. 650 milliGRAM(s) Oral every 6 hours PRN Temp greater or equal to 38.5C (101.3F), Mild Pain (1 - 3)  senna 2 Tablet(s) Oral at bedtime PRN Constipation      IMAGING:   Recent imaging studies were reviewed.      LABS:                          11.7   8.75  )-----------( 150      ( 2025 04:56 )             36.2     07-06    135  |  102  |  19  ----------------------------<  133[H]  4.4   |  18[L]  |  0.57    Ca    9.0      2025 04:56  Phos  3.4     07-06  Mg     2.2     07-06    TPro  7.3  /  Alb  4.2  /  TBili  0.6  /  DBili  x   /  AST  19  /  ALT  12  /  AlkPhos  66  07-05    LIVER FUNCTIONS - ( 2025 15:31 )  Alb: 4.2 g/dL / Pro: 7.3 g/dL / ALK PHOS: 66 U/L / ALT: 12 U/L / AST: 19 U/L / GGT: x           PT/INR - ( 2025 15:32 )   PT: 12.4 sec;   INR: 1.08 ratio         PTT - ( 2025 15:32 )  PTT:29.4 sec  Urinalysis Basic - ( 2025 04:56 )    Color: Yellow / Appearance: Clear / S.028 / pH: x  Gluc: 133 mg/dL / Ketone: x  / Bili: Negative / Urobili: 0.2 mg/dL   Blood: x / Protein: Trace mg/dL / Nitrite: Negative   Leuk Esterase: Negative / RBC: 36 /HPF / WBC 2 /HPF   Sq Epi: x / Non Sq Epi: 1 /HPF / Bacteria: Negative /HPF      Radiology:   CRT head stable

## 2025-07-07 NOTE — DIETITIAN INITIAL EVALUATION ADULT - ETIOLOGY
increased demand for nutrient in the setting of brain injury, wound healing decreased appetite and PO intake in setting of altered neurological function

## 2025-07-07 NOTE — DIETITIAN INITIAL EVALUATION ADULT - REASON INDICATOR FOR ASSESSMENT
Nutrition Assessment warranted for length of stay.  Information obtained from: RN, comprehensive chart review, interdisciplinary medical rounds, daughter at beside (speaks English).

## 2025-07-07 NOTE — DIETITIAN INITIAL EVALUATION ADULT - NSFNSGIIOFT_GEN_A_CORE
-Last BM PTA. On bowel regimen (senna and Miralax).   -Receiving daily vitamin D3 supplement.   -Prescribed Lasix daily.  -Synthroid ordered in setting of hypothyroidism.

## 2025-07-07 NOTE — CONSULT NOTE ADULT - SUBJECTIVE AND OBJECTIVE BOX
Patient is a 84y old  Female who presents with a chief complaint of Pt is an 83 yo F with PMH: HTN, HLD, hypothyroidism. Presented to [facility] after being found down by family. Transferred to [facility] for higher level of care. 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  (2025 12:05)    HPI:  HPI: 84F on ASA81, PMHx HTN, HLD pt found down, no external signs of trauma @ CTH w/Lt BG IPH, IVH , DDAVP + keppra given at . Plts wnl    Exam: Easily arousable, EOV, Ox1, PERRL, EOMI, Rt facial, RUE 3/5, LUE 5/5, BLE 5/5     84yF was admitted on , patient seen today, daughter at bedside. Patient lethargic, recently received ativan, as per daughter agitated overnight.       REVIEW OF SYSTEMS  unable to obtain    VITALS  T(C): 37.1 (25 @ 11:00), Max: 37.1 (25 @ 11:00)  HR: 71 (25 @ 14:00) (67 - 93)  BP: 136/71 (25 @ 14:00) (131/77 - 168/68)  RR: 19 (25 @ 14:00) (13 - 36)  SpO2: 96% (25 @ 14:00) (92% - 98%)  Wt(kg): --    PAST MEDICAL & SURGICAL HISTORY  No pertinent past medical history        FUNCTIONAL HISTORY  Lives with . steps to enter home   Independent AMB and ADLs PTA     CURRENT FUNCTIONAL STATUS  SLP 7/  FEES  functional swallow  regular/thin     PT  7/6  bed mobility mod assist   transfers mod assist x 2, knees blocked   gait max assist bed to chair,   standing balance poor     OT 7/6  bed mobility mod assist   transfers mod to max assist x 2  dressing mod to max assist     RECENT LABS/IMAGING  CBC Full  -  ( 2025 04:56 )  WBC Count : 8.75 K/uL  RBC Count : 4.07 M/uL  Hemoglobin : 11.7 g/dL  Hematocrit : 36.2 %  Platelet Count - Automated : 150 K/uL  Mean Cell Volume : 88.9 fl  Mean Cell Hemoglobin : 28.7 pg  Mean Cell Hemoglobin Concentration : 32.3 g/dL  Auto Neutrophil # : x  Auto Lymphocyte # : x  Auto Monocyte # : x  Auto Eosinophil # : x  Auto Basophil # : x  Auto Neutrophil % : x  Auto Lymphocyte % : x  Auto Monocyte % : x  Auto Eosinophil % : x  Auto Basophil % : x    07-    135  |  102  |  19  ----------------------------<  133[H]  4.4   |  18[L]  |  0.57    Ca    9.0      2025 04:56  Phos  3.4     -  Mg     2.2     -    TPro  7.3  /  Alb  4.2  /  TBili  0.6  /  DBili  x   /  AST  19  /  ALT  12  /  AlkPhos  66      Urinalysis Basic - ( 2025 04:56 )    Color: Yellow / Appearance: Clear / S.028 / pH: x  Gluc: 133 mg/dL / Ketone: x  / Bili: Negative / Urobili: 0.2 mg/dL   Blood: x / Protein: Trace mg/dL / Nitrite: Negative   Leuk Esterase: Negative / RBC: 36 /HPF / WBC 2 /HPF   Sq Epi: x / Non Sq Epi: 1 /HPF / Bacteria: Negative /HPF    < from: CT Head No Cont (25 @ 09:22) >    FINDINGS:    No significant interval change in appearance of 3.1 x 1.5 cm left   thalamic acute parenchymal hemorrhage with surrounding edema.    Similar mild to moderate intraventricular extension.    Ventricles similar in size, no hydrocephalus. No midline shift or   effacement of basal cisterns.    IMPRESSION:    No significant interval change from CT brain 2025.    < end of copied text >        ALLERGIES  No Known Allergies      MEDICATIONS   acetaminophen     Tablet .. 650 milliGRAM(s) Oral every 6 hours PRN  atorvastatin 10 milliGRAM(s) Oral at bedtime  calcium carbonate    500 mG (Tums) Chewable 1 Tablet(s) Chew daily  carvedilol 25 milliGRAM(s) Oral every 12 hours  chlorhexidine 4% Liquid 1 Application(s) Topical daily  cholecalciferol 2000 Unit(s) Oral daily  enoxaparin Injectable 40 milliGRAM(s) SubCutaneous <User Schedule>  furosemide    Tablet 20 milliGRAM(s) Oral daily  levothyroxine 88 MICROGram(s) Oral daily  losartan 100 milliGRAM(s) Oral daily  polyethylene glycol 3350 17 Gram(s) Oral daily  senna 2 Tablet(s) Oral at bedtime PRN      ----------------------------------------------------------------------------------------  PHYSICAL EXAM  Constitutional - NAD, Comfortable, in bed   Chest - Breathing comfortably on room air   Cardiovascular - S1S2   Extremities - No C/C/E, No calf tenderness   Neurologic Exam -                   Cognitive - does not open eyes      Communication - no verbal output      Psychiatric - Mood stable, Affect WNL  ----------------------------------------------------------------------------------------  ASSESSMENT/PLAN  84yFemale h/o HTN< hypothyroid with functional deficits 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  CT stable as above, SKYLAR pending  s/p FEES, regular diet   Pain - Tylenol  DVT PPX - SCDs lovenox   Rehab -    patient requires mod to max assist with mobility    continue bedside therapy while admitted to prevent secondary complications of immobility, bed mobility, transfer training, progressive ambulation, equipment evaluation, ADLs   OOB throughout the day with staff, OOB to chair with meals/3 hours daily          Recommend ACUTE inpatient rehabilitation for the functional deficits consisting of 3 hours of multidisciplinary intense therapy/day x 5 days/week x 2-4 weeks depending on progress at rehabilitation facility, 24 hour RN/daily PMR physician for comorbid medical management.      Patient will be able to participate in and benefit from intense rehabilitation therapies for 3 hours a day x 5 days/week to maximize independence.     Rehab recommendations are dependent on functional progress and participation with bedside therapy     Will continue to follow for ongoing rehab needs and recommendations.       55 minutes spent, reviewing hospital course, therapy notes, relevant imaging, previous hospitalizations, exam, education about inpatient rehabilitation, documentation, and discussion with  and rehabilitation team

## 2025-07-07 NOTE — PROGRESS NOTE ADULT - ATTENDING COMMENTS
seen with stroke team in NSCU on 7/7  L thalalmic IPH with IVH likely HTN  MRI brain with dn w/o  BP control with SBP < 160  okay for stroke unit when abed available   Perez Young MD  Vascular Neurology  Office: [phone]

## 2025-07-07 NOTE — PROGRESS NOTE ADULT - SUBJECTIVE AND OBJECTIVE BOX
NSCU ATTENDING -- ADDITIONAL PROGRESS NOTE    Nighttime rounds were performed -- please refer to earlier Progress Note for HPI details.    T(C): 36.8 (07-07-25 @ 19:00), Max: 37.1 (07-07-25 @ 11:00)  HR: 74 (07-07-25 @ 20:00) (67 - 93)  BP: 137/68 (07-07-25 @ 20:00) (122/59 - 168/68)  RR: 16 (07-07-25 @ 20:00) (13 - 35)  SpO2: 95% (07-07-25 @ 20:00) (94% - 98%)  Wt(kg): --    Relevant labwork and imaging reviewed.    neuro exam unchanged.  accepted by stroke service.  [facility]  lab holiday.  q4h neuro checks.

## 2025-07-07 NOTE — DIETITIAN INITIAL EVALUATION ADULT - ADD RECOMMEND
1. Continue Regular diet with NO therapeutic restrictions at this time. Defer consistency to medical team, SLP.  2. Encourage and monitor PO intake. Golden dietary preferences as expressed as able.   3. Recommend Ensure Plus High Protein 2x daily to optimize nutrient/energy intake.   4. Recommend multivitamin (if no medication contraindications) to aid in prevention of micronutrient deficiencies.  5. Monitor wt trends/labs/skin integrity/hydration status/bowel regularity.

## 2025-07-07 NOTE — DIETITIAN INITIAL EVALUATION ADULT - ORAL INTAKE PTA/DIET HISTORY
-Patient asleep at time of RD visit. Lethargic and disoriented per RN. Daughter at bedside able to assist with nutrition evaluation.   -Reports patient had a very good appetite and PO intake prior to admission. Consumed three meals daily. Prefers food from home/Italian foods. Generally prefers bland/plain foods. Dietary preferences noted (baked chicken, roast chicken, french fries, hamburger, turkey sandwich, vanilla ice cream, hard boiled egg, [employer]).   -Denies intolerance to chewing/swallowing. Denies food allergies. Denies baseline nausea, vomiting, constipation or diarrhea.

## 2025-07-07 NOTE — DIETITIAN INITIAL EVALUATION ADULT - PERTINENT LABORATORY DATA
07-06    135  |  102  |  19  ----------------------------<  133[H]  4.4   |  18[L]  |  0.57    Ca    9.0      06 Jul 2025 04:56  Phos  3.4     07-06  Mg     2.2     07-06    TPro  7.3  /  Alb  4.2  /  TBili  0.6  /  DBili  x   /  AST  19  /  ALT  12  /  AlkPhos  66  07-05  A1C with Estimated Average Glucose Result: 5.4 % (07-05-25 @ 15:31)

## 2025-07-07 NOTE — DIETITIAN INITIAL EVALUATION ADULT - ENERGY INTAKE
Poor appetite and PO intake noted in-house thus far. Daughter educated on menu ordering procedure./Poor (<50%)

## 2025-07-07 NOTE — PROGRESS NOTE ADULT - ASSESSMENT
83 yo female with PMH of HTN, HLD, hypothyroidism on aspirin presenting to emergency department as transfer from [place] due to CTH finding of 3 cm acute parenchymal hemorrhage in the left corona radiata and thalamus with intraventricular hemorrhage and bilateral lateral 3rd and 4th ventricles. As per family patient was found down in shower this morning, had right sided weakness and concern for possible facial droop, altered mental status. Currently patient is unable to provide further history due to AMS, history obtained from charts.    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    Recommendations:  [o] MRI Brain w/wo contrast  [] TTE for LVH  [] BP goal < 160 systolic  [] Stroke neurochecks Q2  [] PT/OT eval --> Acute Rehab  [] Pt accepted for downgrade to Stroke Floor

## 2025-07-07 NOTE — PROGRESS NOTE ADULT - ASSESSMENT
ASSESSMENT/PLAN: 84F on ASA s/p DDAVP s/p fall with Left basal ganglia hemorrhage w/ IVH    NEURO:  Neuro/vitals q1  Interval CTH stable hemorrhage, CTA head and neck negative  CTH in AM  Hydrowatch  MRI ordered  Stroke following  Seizure ppx: Keppra 500mg BID x 7 days (End date: 7/11/2025)  Pain: tylenol PRN   Activity: [x] OOB as tolerated [] Bedrest [x] PT [x] OT [] PMNR    PULM:  Incentive spirometry, mobilize as tolerated  SpO2 >92%, on room air    CV:  -140mmHg  Cardene gtt   Lipid panel pending  TTE pending  Hold Aspirin, ARU pending   continue home atorvastatin 10mg at bedtime  c/w Coreg 25 BID  c/w Losartan 100 daily    RENAL:  NS 65cc/h  Hold home Lasix    GI:  Diet: Dysphagia screen and then advance diet as tolerated  senna and miralax for bowel regimen  Last Bowel Movement: PTA  GI prophylaxis [x] not indicated [] PPI [] other:    ENDO:   Goal euglycemia (-180)  continue home synthroid 88mcg daily  a1c and thyroid panel pending    HEME/ONC:  VTE prophylaxis: [x] SCDs [x] hold chemoprophylaxis due to: bleed day 0 of hemorrhage  LED-p    ID:  afebrile    MISC:    SOCIAL/FAMILY:  [ ] awaiting [x] updated at bedside [] family meeting    CODE STATUS:  [x] Full Code [] DNR [] DNI [] Palliative/Comfort Care    DISPOSITION:  [x] ICU [] Stroke Unit [] Floor [] EMU [] RCU [] PCU    [x] Patient is at high risk of neurologic deterioration/death due to: thalamic hemorrhage, intraventricular hemorrhage       Contact: [phone] ASSESSMENT/PLAN: 84F on ASA s/p DDAVP s/p fall with Left basal ganglia hemorrhage w/ IVH    NEURO:  Neuro/vitals q1  Interval CTH stable hemorrhage, CTA head and neck negative  CTH in AM  Hydrowatch  MRI ordered  Stroke following  Seizure ppx: Keppra 500mg BID x 7 days (End date: 7/11/2025)  Pain: tylenol PRN   Activity: [x] OOB as tolerated [] Bedrest [x] PT [x] OT [] PMNR    PULM:  Incentive spirometry, mobilize as tolerated  SpO2 >92%, on room air    CV:  -140mmHg  Lipid panel pending  TTE pending  Hold Aspirin, ARU pending   continue home atorvastatin 10mg at bedtime  c/w Coreg 25 BID  c/w Losartan 100 daily    RENAL:  NS 65cc/h  Hold home Lasix    GI:  Diet: regular diet   senna and miralax for bowel regimen  Last Bowel Movement: PTA  GI prophylaxis [x] not indicated [] PPI [] other:    ENDO:   Goal euglycemia (-180)  continue home synthroid 88mcg daily  a1c and thyroid panel pending    HEME/ONC:  VTE prophylaxis: [x] SCDs [x] hold chemoprophylaxis due to: bleed day 0 of hemorrhage  LED-p    ID:  afebrile    MISC:    SOCIAL/FAMILY:  [ ] awaiting [x] updated at bedside [] family meeting    CODE STATUS:  [x] Full Code [] DNR [] DNI [] Palliative/Comfort Care    DISPOSITION:  [x] ICU [] Stroke Unit [] Floor [] EMU [] RCU [] PCU    [x] Patient is at high risk of neurologic deterioration/death due to: thalamic hemorrhage, intraventricular hemorrhage       Contact: [phone] ASSESSMENT/PLAN: 84F on ASA s/p DDAVP s/p fall with Left basal ganglia hemorrhage w/ IVH    NEURO:  Neuro/vitals q2hr and q4hr overnight   Interval CTH stable hemorrhage, CTA head and neck negative  Hydrowatch  MRI head w/o at 1600pm   Stroke following  Seizure ppx: Keppra 500mg BID x 7 days (End date: 7/11/2025)  Pain: tylenol PRN   Activity: [x] OOB as tolerated [] Bedrest [x] PT [x] OT [] PMNR    PULM:  Incentive spirometry, mobilize as tolerated  SpO2 >92%, on room air    CV:  -160 mmHg  Lipid panel pending  TTE pending  Hold Aspirin, ARU pending   continue home atorvastatin 10mg at bedtime  c/w Coreg 25 BID  c/w Losartan 100 daily    RENAL:  Normonatremic   IVL   restart lasix 20mg PO qday     GI:  Diet: regular diet   senna and miralax for bowel regimen- patient has been refusing medications   Last Bowel Movement: PTA  GI prophylaxis [x] not indicated [] PPI [] other:    ENDO:   Goal euglycemia (-180)  continue home synthroid 88mcg daily  a1c and thyroid panel pending    HEME/ONC:  VTE prophylaxis: [x] SCDs [x] hold chemoprophylaxis due to: bleed day 2 of hemorrhage  LED-p    ID:  afebrile    MISC:    SOCIAL/FAMILY:  [ ] awaiting [x] updated at bedside [] family meeting    CODE STATUS:  [x] Full Code [] DNR [] DNI [] Palliative/Comfort Care    DISPOSITION:  [x] ICU [] Stroke Unit [] Floor [] EMU [] RCU [] PCU    [x] Patient is at high risk of neurologic deterioration/death due to: thalamic hemorrhage, intraventricular hemorrhage       Contact: [phone]

## 2025-07-07 NOTE — DIETITIAN INITIAL EVALUATION ADULT - OTHER INFO
Dosing wt (7/5): 143 lbs.   Per daughter,  lbs. Will continue to monitor/trend.   Net loss of 3.3% in undisclosed time period. To note, patient on Lasix in-house and prior to admission. At risk for wt fluctuations and fluid shifts.

## 2025-07-07 NOTE — PROGRESS NOTE ADULT - SUBJECTIVE AND OBJECTIVE BOX
******  Neurology   Thomas Zavaleta PGY2  Spectra: [phone] ([facility]), [phone] ([facility])  ******    Subjective:  Pt feeling well overall. Daughter at bedside providing Israeli translation. Daughter reports pt appears more tired and got agitated overnight.    Interval Events:  Off cardene ggt    Physical Examination:  General - non-toxic appearing, in no acute distress  Cardiovascular - no edema  Respiratory - breathing comfortably with no increased work of breathing    Neurologic Exam:  Mental status - Awake, Alert. Pt does not know month which is pts baseline. Unaware of who the president is.     Cranial nerves - VFF, EOMI, facial strength intact without asymmetry b/l    Motor - RUE drift, LUE no drift, mild drift in RLE, no drift in LLE    Sensation - Light touch intact throughout      Radiology:    CT HEAD:    No new or increasing intracranial hemorrhage since this morning's scan at   outside hospital (separate record number, accession #[id number]).    Left thalamic and corona radiata hemorrhage with intraventricular   extension. Stable ventricular size but close follow-up advised.      CTA BRAIN:  No arterial occlusion or significant stenosis. No aneurysm or other   arterial source for the left parenchymal hemorrhage.    CTA NECK:  No arterial steno-occlusive disease or evidence of dissection.

## 2025-07-08 ENCOUNTER — TRANSCRIPTION ENCOUNTER (OUTPATIENT)
Age: 85
End: 2025-07-08

## 2025-07-08 PROCEDURE — 99233 SBSQ HOSP IP/OBS HIGH 50: CPT | Mod: 25,24

## 2025-07-08 RX ORDER — AMLODIPINE BESYLATE 10 MG/1
5 TABLET ORAL DAILY
Refills: 0 | Status: DISCONTINUED | OUTPATIENT
Start: 2025-07-08 | End: 2025-07-14

## 2025-07-08 RX ADMIN — CARVEDILOL 25 MILLIGRAM(S): 3.12 TABLET, FILM COATED ORAL at 04:42

## 2025-07-08 RX ADMIN — AMLODIPINE BESYLATE 5 MILLIGRAM(S): 10 TABLET ORAL at 12:20

## 2025-07-08 RX ADMIN — Medication 2000 UNIT(S): at 11:10

## 2025-07-08 RX ADMIN — Medication 5 MILLIGRAM(S): at 12:20

## 2025-07-08 RX ADMIN — ATORVASTATIN CALCIUM 10 MILLIGRAM(S): 80 TABLET, FILM COATED ORAL at 21:15

## 2025-07-08 RX ADMIN — FUROSEMIDE 20 MILLIGRAM(S): 10 INJECTION INTRAMUSCULAR; INTRAVENOUS at 04:42

## 2025-07-08 RX ADMIN — Medication 1 APPLICATION(S): at 21:14

## 2025-07-08 RX ADMIN — POLYETHYLENE GLYCOL 3350 17 GRAM(S): 17 POWDER, FOR SOLUTION ORAL at 11:10

## 2025-07-08 RX ADMIN — CALCIUM CARBONATE 1 TABLET(S): 750 TABLET ORAL at 11:10

## 2025-07-08 RX ADMIN — ENOXAPARIN SODIUM 40 MILLIGRAM(S): 100 INJECTION SUBCUTANEOUS at 18:18

## 2025-07-08 RX ADMIN — CARVEDILOL 25 MILLIGRAM(S): 3.12 TABLET, FILM COATED ORAL at 19:02

## 2025-07-08 RX ADMIN — Medication 88 MICROGRAM(S): at 04:42

## 2025-07-08 RX ADMIN — Medication 50 MILLILITER(S): at 00:55

## 2025-07-08 RX ADMIN — LOSARTAN POTASSIUM 100 MILLIGRAM(S): 100 TABLET, FILM COATED ORAL at 03:18

## 2025-07-08 NOTE — PROGRESS NOTE ADULT - ATTENDING COMMENTS
84-year-old woman with hypertensive left basal ganglia hemorrhage with intraventricular extension (ICH score 2) with serial stable imaging.     Oriented to self and place, follows commands, +dysarthria, RUE drift, RUE 3/5, otherwise 5/5    Accepted to stroke unit. Being evaluated for left hand trigger finger splint.     Neuro: q4hr neuro checks/protected sleep time at night to mitigate risk of delirium, PT/OT/PMNR  Pulm: tolerating RA  CV: 100-160mmHg, continue BP meds except furosemide; PRN diuretic if needed but will hold standing due to poor PO intake  GI: Regular diet - monitor intake, consider shakes if continued poor PO  Renal: IVL and monitor intake; PRN fluids if not taking good PO  Endo: levothyroxine  Heme: SCDs, chemoppx  ID: Monitor for fever  Dispo: [facility] unit, will work on acute rehab placement

## 2025-07-08 NOTE — PROGRESS NOTE ADULT - ASSESSMENT
ASSESSMENT/PLAN: 84F on ASA s/p DDAVP s/p fall with Left basal ganglia hemorrhage w/ IVH    NEURO:  Neuro/vitals q2hr and q4hr overnight   Interval CTH stable hemorrhage, CTA head and neck negative  Hydrowatch  MRI head w/o noted above   Stroke following  Seizure ppx: Keppra 500mg BID x 7 days (End date: 7/11/2025)  Pain: tylenol PRN   stroke neuro following   Activity: [x] OOB as tolerated [] Bedrest [x] PT [x] OT [] PMNR    PULM:  Incentive spirometry, mobilize as tolerated  SpO2 >92%, on room air    CV:  -160 mmHg  Lipid panel pending  TTE pending  Hold Aspirin, ARU pending   continue home atorvastatin 10mg at bedtime  c/w Coreg 25 BID  c/w Losartan 100 daily    RENAL:  Normonatremic   IVL   restart lasix 20mg PO qday     GI:  Diet: regular diet   senna and miralax for bowel regimen- patient has been refusing medications   Last Bowel Movement: PTA  GI prophylaxis [x] not indicated [] PPI [] other:    ENDO:   Goal euglycemia (-180)  continue home synthroid 88mcg daily  a1c and thyroid panel pending    HEME/ONC:  VTE prophylaxis: [x] SCDs [x] hold chemoprophylaxis due to: bleed day 2 of hemorrhage  LED-p    ID:  afebrile    MISC:    SOCIAL/FAMILY:  [ ] awaiting [x] updated at bedside [] family meeting    CODE STATUS:  [x] Full Code [] DNR [] DNI [] Palliative/Comfort Care    DISPOSITION:  [x] ICU [] Stroke Unit [] Floor [] EMU [] RCU [] PCU    [x] Patient is at high risk of neurologic deterioration/death due to: thalamic hemorrhage, intraventricular hemorrhage       Contact: [phone] ASSESSMENT/PLAN: 84F on ASA s/p DDAVP s/p fall with Left basal ganglia hemorrhage w/ IVH    NEURO:  Neuro/vitals q2hr and q4hr overnight   Interval CTH stable hemorrhage, CTA head and neck negative  MRI head w/o noted above   Stroke following  Seizure ppx: Keppra 500mg BID x 7 days (End date: 7/11/2025)  Pain: tylenol PRN   stroke neuro following- accepted to stroke unit  Activity: [x] OOB as tolerated [] Bedrest [x] PT [x] OT [] PMNR    PULM:  Incentive spirometry, mobilize as tolerated  SpO2 >92%, on room air    CV:  -160 mmHg  LDL 79  TTE noted above   Hold Aspirin, ARU pending   continue home atorvastatin 10mg at bedtime  c/w Coreg 25 BID  c/w Losartan 100 daily    RENAL:  Normonatremic   IVL   restart lasix 20mg PO qday     GI:  Diet: regular diet   senna and miralax for bowel regimen- patient has been refusing medications   Last Bowel Movement: PTA  GI prophylaxis [x] not indicated [] PPI [] other:    ENDO:   Goal euglycemia (-180)  continue home synthroid 88mcg daily  a1c and thyroid panel pending    HEME/ONC:  VTE prophylaxis: [x] SCDs [x] hold chemoprophylaxis due to: bleed day 2 of hemorrhage  LED-p    ID:  afebrile    MISC:    SOCIAL/FAMILY:  [ ] awaiting [x] updated at bedside [] family meeting    CODE STATUS:  [x] Full Code [] DNR [] DNI [] Palliative/Comfort Care    DISPOSITION:  [ ICU x] Stroke Unit [] Floor [] EMU [] RCU [] PCU    [x] Patient is at high risk of neurologic deterioration/death due to: thalamic hemorrhage, intraventricular hemorrhage       Contact: [phone] ASSESSMENT/PLAN: 84F on ASA s/p DDAVP s/p fall with Left basal ganglia hemorrhage w/ IVH    NEURO:  Neuro/vitals q4hr and protected sleep time   Interval CTH stable hemorrhage, CTA head and neck negative  MRI head w/o noted above   Stroke following  Pain: tylenol PRN   stroke neuro following- accepted to stroke unit  Activity: [x] OOB as tolerated [] Bedrest [x] PT [x] OT [] PMNR    PULM:  Incentive spirometry, mobilize as tolerated  SpO2 >92%, on room air    CV:  -160 mmHg  LDL 79  TTE noted above   Hold Aspirin, ARU pending   continue home atorvastatin 10mg at bedtime  c/w Coreg 25 BID  c/w Losartan 100 daily    RENAL:  Normonatremic  IVL   dc lasix     GI:  Diet: regular diet   senna and miralax for bowel regimen- patient has been refusing medications   Last Bowel Movement: PTA  GI prophylaxis [x] not indicated [] PPI [] other:    ENDO:   Goal euglycemia (-180)  continue home synthroid 88mcg daily      HEME/ONC:  VTE prophylaxis: [x] SCDs [x] hold chemoprophylaxis due to: bleed day 2 of hemorrhage  LED-p  Lovenox     ID:  afebrile    MISC:    SOCIAL/FAMILY:  [ ] awaiting [x] updated at bedside [] family meeting    CODE STATUS:  [x] Full Code [] DNR [] DNI [] Palliative/Comfort Care    DISPOSITION:  [ ICU x] Stroke Unit [] Floor [] EMU [] RCU [] PCU    [x] Patient is at high risk of neurologic deterioration/death due to: thalamic hemorrhage, intraventricular hemorrhage       Contact: [phone]

## 2025-07-08 NOTE — DISCHARGE NOTE NURSING/CASE MANAGEMENT/SOCIAL WORK - PATIENT PORTAL LINK FT
You can access the FollowMyHealth Patient Portal offered by [facility] by registering at the following website: [email or web address]. By joining [employer]’s FollowMyHealth portal, you will also be able to view your health information using other applications (apps) compatible with our system.

## 2025-07-08 NOTE — PROGRESS NOTE ADULT - SUBJECTIVE AND OBJECTIVE BOX
DATE OF SERVICE: 07-08-25 @ 11:39    Patient is a 84y old  Female who presents with a chief complaint of IPH w/ IVH (08 Jul 2025 11:04)      INTERVAL HISTORY: Feels ok.     REVIEW OF SYSTEMS:  CONSTITUTIONAL: No weakness  EYES/ENT: No visual changes;  No throat pain   NECK: No pain or stiffness  RESPIRATORY: No cough, wheezing; No shortness of breath  CARDIOVASCULAR: No chest pain or palpitations  GASTROINTESTINAL: No abdominal  pain. No nausea, vomiting, or hematemesis  GENITOURINARY: No dysuria, frequency or hematuria  NEUROLOGICAL: No stroke like symptoms  SKIN: No rashes    TELEMETRY Personally reviewed: SR   	  MEDICATIONS:  carvedilol 25 milliGRAM(s) Oral every 12 hours  losartan 100 milliGRAM(s) Oral daily        PHYSICAL EXAM:  T(C): 37 (07-08-25 @ 11:00), Max: 37 (07-08-25 @ 11:00)  HR: 80 (07-08-25 @ 11:00) (65 - 93)  BP: 167/81 (07-08-25 @ 11:00) (122/56 - 170/73)  RR: 18 (07-08-25 @ 11:00) (16 - 29)  SpO2: 97% (07-08-25 @ 11:00) (94% - 99%)  Wt(kg): --  I&O's Summary    07 Jul 2025 07:01  -  08 Jul 2025 07:00  --------------------------------------------------------  IN: 890 mL / OUT: 1200 mL / NET: -310 mL    08 Jul 2025 07:01  -  08 Jul 2025 11:39  --------------------------------------------------------  IN: 150 mL / OUT: 0 mL / NET: 150 mL          Appearance: In no distress	  HEENT:    PERRL, EOMI	  Cardiovascular:  S1 S2, No JVD  Respiratory: Lungs clear to auscultation	  Gastrointestinal:  Soft, Non-tender, + BS	  Vascularature:  No edema of LE  Psychiatric: Appropriate affect   Neuro: no acute focal deficits                     Labs personally reviewed      ASSESSMENT/PLAN: 	     84F on ASA81, PMHx HTN, HLD pt found down, no external signs of trauma @ CTH w/Lt BG IPH, IVH , DDAVP + keppra given at .    Problem 1: ICH  - CT head 7/5 with Left thalamic and corona radiata hemorrhage with intraventricular extension  - Interval CTH stable hemorrhage, CTA head and neck negative  - Appreciate NSICU and Neurosurgery recs  - Seizure ppx: Keppra 500mg BID x 7 days (End date: 7/11/2025)    Problem 2: LE edema  - TTE (OP) 5/2025 - EF 65-70%, mild grade 1 DD, LA mod dilated, lipomatous intratrial septum, ascending aorta 3.8cm, mod-severe TR, mod MR, calcified mitral valve with prolapse of posterior mitral leaflet, mild-mod AR and CT  -- findings stable compared to TTE 2/2024  - Duplex US LE 5/2025 - no evidence of DVT  - Duplex US LE 7/5/25 - no evidence of DVT  - TTE 7/7 EF 60%, no rmwa. Compared to prior echo, the posterior leaflet of the mitral valve is not proalpsing on this study (though it was on the prior). There is trace mitral regurgitation. There is decreased tricuspid regurgitation.    Problem 3: HTN  - At home on candesartan 32 mg, metoprolol 50mg BID & Lasix 20mg   - In hospital, c/w Coreg 12.5mg BID, Losartan 100mg QD    Problem 4: HLD  - On simvastatin 10mg QD at home  - In hospital, c/w Lipitor 10mg     Follows OP cardio Dr. Kartik Mcgee, AG-NP   Fahad Ibarra DO [facility]  Cardiovascular Medicine  [address], [address]  Available through call or text on [employer] TE[employer]  Office: [phone]   DATE OF SERVICE: 07-08-25 @ 11:39    Patient is a 84y old  Female who presents with a chief complaint of IPH w/ IVH (08 Jul 2025 11:04)      INTERVAL HISTORY: Feels ok.     REVIEW OF SYSTEMS:  CONSTITUTIONAL: No weakness  EYES/ENT: No visual changes;  No throat pain   NECK: No pain or stiffness  RESPIRATORY: No cough, wheezing; No shortness of breath  CARDIOVASCULAR: No chest pain or palpitations  GASTROINTESTINAL: No abdominal  pain. No nausea, vomiting, or hematemesis  GENITOURINARY: No dysuria, frequency or hematuria  NEUROLOGICAL: No stroke like symptoms  SKIN: No rashes    TELEMETRY Personally reviewed: SR   	  MEDICATIONS:  carvedilol 25 milliGRAM(s) Oral every 12 hours  losartan 100 milliGRAM(s) Oral daily        PHYSICAL EXAM:  T(C): 37 (07-08-25 @ 11:00), Max: 37 (07-08-25 @ 11:00)  HR: 80 (07-08-25 @ 11:00) (65 - 93)  BP: 167/81 (07-08-25 @ 11:00) (122/56 - 170/73)  RR: 18 (07-08-25 @ 11:00) (16 - 29)  SpO2: 97% (07-08-25 @ 11:00) (94% - 99%)  Wt(kg): --  I&O's Summary    07 Jul 2025 07:01  -  08 Jul 2025 07:00  --------------------------------------------------------  IN: 890 mL / OUT: 1200 mL / NET: -310 mL    08 Jul 2025 07:01  -  08 Jul 2025 11:39  --------------------------------------------------------  IN: 150 mL / OUT: 0 mL / NET: 150 mL          Appearance: In no distress	  HEENT:    PERRL, EOMI	  Cardiovascular:  S1 S2, No JVD  Respiratory: Lungs clear to auscultation	  Gastrointestinal:  Soft, Non-tender, + BS	  Vascularature:  No edema of LE  Psychiatric: Appropriate affect   Neuro: no acute focal deficits            Labs personally reviewed      ASSESSMENT/PLAN: 	    84F on ASA81, PMHx HTN, HLD pt found down, no external signs of trauma @ CTH w/Lt BG IPH, IVH , DDAVP + keppra given at .    Problem 1: ICH  - CT head 7/5 with Left thalamic and corona radiata hemorrhage with intraventricular extension  - Interval CTH stable hemorrhage, CTA head and neck negative  - Appreciate NSICU and Neurosurgery recs  - Seizure ppx: Keppra 500mg BID x 7 days (End date: 7/11/2025)    Problem 2: LE edema  - TTE (OP) 5/2025 - EF 65-70%, mild grade 1 DD, LA mod dilated, lipomatous intratrial septum, ascending aorta 3.8cm, mod-severe TR, mod MR, calcified mitral valve with prolapse of posterior mitral leaflet, mild-mod AR and MN  -- findings stable compared to TTE 2/2024  - Duplex US LE 5/2025 - no evidence of DVT  - Duplex US LE 7/5/25 - no evidence of DVT  - TTE 7/7 EF 60%, no rmwa. Compared to prior echo, the posterior leaflet of the mitral valve is not proalpsing on this study (though it was on the prior). There is trace mitral regurgitation. There is decreased tricuspid regurgitation.    Problem 3: HTN  - At home on candesartan 32 mg, metoprolol 50mg BID & Lasix 20mg   - In hospital, c/w Coreg 12.5mg BID, Losartan 100mg QD    Problem 4: HLD  - On simvastatin 10mg QD at home  - In hospital, c/w Lipitor 10mg     Follows OP cardio Dr. Kartik Mcgee, AG-NP   Fahad Ibarra DO [facility]  Cardiovascular Medicine  [address], [address]  Available through call or text on [employer] TEAM[employer]  Office: [phone]

## 2025-07-08 NOTE — DISCHARGE NOTE NURSING/CASE MANAGEMENT/SOCIAL WORK - FINANCIAL ASSISTANCE
[facility] provides services at a reduced cost to those who are determined to be eligible through [facility]’s financial assistance program. Information regarding [facility]’s financial assistance program can be found by going to [email or web address] or by calling [phone].

## 2025-07-08 NOTE — PROGRESS NOTE ADULT - TIME BILLING
Data reviewed, patient seen/examined and care plan reviewed/updated with fellow. Care plan coordinated with Stroke Neurology.

## 2025-07-08 NOTE — PROGRESS NOTE ADULT - ASSESSMENT
85 yo female with PMH of HTN, HLD, hypothyroidism on aspirin presenting to emergency department as transfer from [place] due to CTH finding of 3 cm acute parenchymal hemorrhage in the left corona radiata and thalamus with intraventricular hemorrhage and bilateral lateral 3rd and 4th ventricles. As per family patient was found down in shower this morning, had right sided weakness and concern for possible facial droop, altered mental status. Currently patient is unable to provide further history due to AMS, history obtained from charts. RUE drift appears to be improving.     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    Recommendations:  [x] MRI Brain w/wo contrast  [x] TTE for LVH  [] BP goal < 160 systolic  [] Stroke neurochecks Q2  [x] PT/OT eval --> Acute Rehab  [] Pt accepted for downgrade to Stroke Floor     83 yo female with PMH of HTN, HLD, hypothyroidism on aspirin presenting to emergency department as transfer from [place] due to CTH finding of 3 cm acute parenchymal hemorrhage in the left corona radiata and thalamus with intraventricular hemorrhage and bilateral lateral 3rd and 4th ventricles. As per family patient was found down in shower this morning, had right sided weakness and concern for possible facial droop, altered mental status. Currently patient is unable to provide further history due to AMS, history obtained from charts. RUE drift appears to be improving.     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    Plan:    #L thalamic Hemorrhage     [ ] Pt accepted for downgrade to Stroke Floor  [x] MRI Brain w/wo contrast  [ ] Repeat MR brain w and w/o in 4 weeks   [x] TTE for LVH  [ ] Hold any AC/AP. Outpatient, cardiology will decide when to resume antiplatelet if there is a cardiac indication   [x] Continue chemical DVT ppx Lovenox SC   [x] BP goal < 140 systolic.   [x] Stroke neurochecks and vitals q4  [x] PT/OT eval --> Acute Rehab  [ ] SLP eval     #HTN  [x] Cardiology consulted for management of HTN   [ ] SBP goal <140  [ ] Currently on Coreg 25mg BID, losartan 100mg daily, amlodipine 5mg daily     #HLD  [x] Continue atorvastatin 10mg qhs     #Hypothyroidism  [] Continue synthroid 88mcg daily       Diet: Regular  DVT ppx: Lovenox SC  Dispo: AR    Patient seen and discussed with stroke attending Dr. Young.        85 yo female with PMH of HTN, HLD, hypothyroidism on aspirin presenting to emergency department as transfer from [place] due to CTH finding of 3 cm acute parenchymal hemorrhage in the left corona radiata and thalamus with intraventricular hemorrhage and bilateral lateral 3rd and 4th ventricles. As per family patient was found down in shower this morning, had right sided weakness and concern for possible facial droop, altered mental status. Currently patient is unable to provide further history due to AMS, history obtained from charts. RUE drift appears to be improving.     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    Plan:    #L thalamic Hemorrhage   [ ] Pt accepted for downgrade to Stroke Floor  [x] MRI Brain w/wo contrast  [ ] Repeat MR brain w and w/o in 4 weeks   [x] TTE for LVH  [ ] Hold any AC/AP. Outpatient, cardiology will decide when to resume antiplatelet if there is a cardiac indication   [x] Continue chemical DVT ppx Lovenox SC   [x] BP goal < 140 systolic.   [x] Stroke neurochecks and vitals q4  [x] PT/OT eval --> Acute Rehab  [ ] SLP eval     #HTN  [x] Cardiology consulted for management of HTN   [ ] SBP goal <140  [ ] Currently on Coreg 25mg BID, losartan 100mg daily, amlodipine 5mg daily     #HLD  [x] Continue atorvastatin 10mg qhs     #Hypothyroidism  [] Continue synthroid 88mcg daily       Diet: Regular  DVT ppx: Lovenox SC  Dispo: AR    Patient seen and discussed with stroke attending Dr. Young.

## 2025-07-08 NOTE — PROGRESS NOTE ADULT - SUBJECTIVE AND OBJECTIVE BOX
HPI:  Pt is an 85yo F with history of HTN, HLD, and hypothyroidism originally presented to  after being found down by family transferred to [facility] for higher level of care of [facility] with IVH. Pt had no obvious signs of trauma per documentation. Pt received DDAVP for ASA use.     Overnight: no overnight events noted.   : pt lethargic. slurring words, and minimal movement on LUE, CT head performed demonstrating grossly normal    Admission Scores  GCS: 14 ICH: 2    HOSPITAL COURSE:    Allergies    No Known Allergies    Intolerances    REVIEW OF SYSTEMS: [ ] Unable to Assess due to neurologic exam   [ x] All ROS addressed below are non-contributory, except:  Neuro: [ ] Headache [ ] Back pain [ ] Numbness [x] Weakness [ ] Ataxia [ ] Dizziness [ ] Aphasia [ ] Dysarthria [ ] Visual disturbance  Resp: [ ] Shortness of breath/dyspnea, [ ] Orthopnea [ ] Cough  CV: [ ] Chest pain [ ] Palpitation [ ] Lightheadedness [ ] Syncope  Renal: [ ] Thirst [ ] Edema  GI: [ ] Nausea [ ] Emesis [ ] Abdominal pain [ ] Constipation [ ] Diarrhea  Hem: [ ] Hematemesis [ ] bright red blood per rectum  ID: [ ] Fever [ ] Chills [ ] Dysuria  ENT: [ ] Rhinorrhea      DEVICES:   [ ] Restraints [ ] ET tube [ ] central line [ ] arterial line [ ] tamez [ ] NGT/OGT [ ] EVD [ ] LD [ ] DEVON/HMV [ ] Trach [ ] PEG [ ] Chest Tube     EXAMINATION:  PHYSICAL EXAM:    Constitutional: No Acute Distress   Neurological:  EOV, oriented to self and place, following commands,  PERRL, EOMI, No Gaze Preference, Right facial, dysarthric   RUE 3/5, LUE 5/5, BLE 5/5    Pulmonary: No rales, No rhonchi, No wheezes   Cardiovascular: Regular rate and rhythm   Gastrointestinal: Soft, Non-tender, Non-distended   Extremities:     ICU Vital Signs Last 24 Hrs  T(C): 36.8 (2025 03:00), Max: 36.8 (2025 07:00)  T(F): 98.2 (2025 03:00), Max: 98.3 (2025 07:00)  HR: 91 (2025 05:00) (75 - 91)  BP: 148/73 (2025 05:00) (120/57 - 168/68)  BP(mean): 101 (2025 05:00) (84 - 112)  ABP: --  ABP(mean): --  RR: 25 (2025 05:00) (20 - 36)  SpO2: 97% (2025 05:00) (92% - 98%)    O2 Parameters below as of 2025 19:00  Patient On (Oxygen Delivery Method): room air    MEDICATIONS  (STANDING):  atorvastatin 10 milliGRAM(s) Oral at bedtime  calcium carbonate    500 mG (Tums) Chewable 1 Tablet(s) Chew daily  carvedilol 12.5 milliGRAM(s) Oral every 12 hours  chlorhexidine 4% Liquid 1 Application(s) Topical daily  cholecalciferol 2000 Unit(s) Oral daily  levothyroxine 88 MICROGram(s) Oral daily  losartan 100 milliGRAM(s) Oral daily  polyethylene glycol 3350 17 Gram(s) Oral daily    MEDICATIONS  (PRN):  acetaminophen     Tablet .. 650 milliGRAM(s) Oral every 6 hours PRN Temp greater or equal to 38.5C (101.3F), Mild Pain (1 - 3)  senna 2 Tablet(s) Oral at bedtime PRN Constipation      IMAGING:   Recent imaging studies were reviewed.      LABS:                          11.7   8.75  )-----------( 150      ( 2025 04:56 )             36.2     07-06    135  |  102  |  19  ----------------------------<  133[H]  4.4   |  18[L]  |  0.57    Ca    9.0      2025 04:56  Phos  3.4     07-06  Mg     2.2     07-06    TPro  7.3  /  Alb  4.2  /  TBili  0.6  /  DBili  x   /  AST  19  /  ALT  12  /  AlkPhos  66  07-05    LIVER FUNCTIONS - ( 2025 15:31 )  Alb: 4.2 g/dL / Pro: 7.3 g/dL / ALK PHOS: 66 U/L / ALT: 12 U/L / AST: 19 U/L / GGT: x           PT/INR - ( 2025 15:32 )   PT: 12.4 sec;   INR: 1.08 ratio         PTT - ( 2025 15:32 )  PTT:29.4 sec  Urinalysis Basic - ( 2025 04:56 )    Color: Yellow / Appearance: Clear / S.028 / pH: x  Gluc: 133 mg/dL / Ketone: x  / Bili: Negative / Urobili: 0.2 mg/dL   Blood: x / Protein: Trace mg/dL / Nitrite: Negative   Leuk Esterase: Negative / RBC: 36 /HPF / WBC 2 /HPF   Sq Epi: x / Non Sq Epi: 1 /HPF / Bacteria: Negative /HPF      Radiology:   CRT head stable    HPI:  Pt is an 85yo F with history of HTN, HLD, and hypothyroidism originally presented to  after being found down by family transferred to [facility] for higher level of care of [facility] with IVH. Pt had no obvious signs of trauma per documentation. Pt received DDAVP for ASA use.     Overnight: no overnight events noted.   7/6: pt lethargic. slurring words, and minimal movement on LUE, CT head performed demonstrating grossly normal  7/7:no events noted.     Admission Scores  GCS: 14 ICH: 2    HOSPITAL COURSE:    Allergies    No Known Allergies    Intolerances    REVIEW OF SYSTEMS: [ ] Unable to Assess due to neurologic exam   [ x] All ROS addressed below are non-contributory, except:  Neuro: [ ] Headache [ ] Back pain [ ] Numbness [x] Weakness [ ] Ataxia [ ] Dizziness [ ] Aphasia [ ] Dysarthria [ ] Visual disturbance  Resp: [ ] Shortness of breath/dyspnea, [ ] Orthopnea [ ] Cough  CV: [ ] Chest pain [ ] Palpitation [ ] Lightheadedness [ ] Syncope  Renal: [ ] Thirst [ ] Edema  GI: [ ] Nausea [ ] Emesis [ ] Abdominal pain [ ] Constipation [ ] Diarrhea  Hem: [ ] Hematemesis [ ] bright red blood per rectum  ID: [ ] Fever [ ] Chills [ ] Dysuria  ENT: [ ] Rhinorrhea      DEVICES:   [ ] Restraints [ ] ET tube [ ] central line [ ] arterial line [ ] tamez [ ] NGT/OGT [ ] EVD [ ] LD [ ] DEVON/HMV [ ] Trach [ ] PEG [ ] Chest Tube     EXAMINATION:  PHYSICAL EXAM:    Constitutional: No Acute Distress   Neurological:  EOV, oriented to self and place, following commands,  PERRL, EOMI, No Gaze Preference, Right facial, dysarthric   RUE 3/5, LUE 5/5, BLE 5/5    Pulmonary: No rales, No rhonchi, No wheezes   Cardiovascular: Regular rate and rhythm   Gastrointestinal: Soft, Non-tender, Non-distended   Extremities:     ICU Vital Signs Last 24 Hrs  T(C): 36.8 (08 Jul 2025 03:00), Max: 37.1 (07 Jul 2025 11:00)  T(F): 98.2 (08 Jul 2025 03:00), Max: 98.7 (07 Jul 2025 11:00)  HR: 78 (08 Jul 2025 05:42) (65 - 93)  BP: 122/56 (08 Jul 2025 05:42) (122/56 - 170/73)  BP(mean): 81 (08 Jul 2025 05:42) (81 - 113)  ABP: --  ABP(mean): --  RR: 17 (08 Jul 2025 03:00) (13 - 35)  SpO2: 99% (08 Jul 2025 03:00) (94% - 99%)    O2 Parameters below as of 07 Jul 2025 19:00  Patient On (Oxygen Delivery Method): room air          MEDICATIONS  (STANDING):  atorvastatin 10 milliGRAM(s) Oral at bedtime  calcium carbonate    500 mG (Tums) Chewable 1 Tablet(s) Chew daily  carvedilol 12.5 milliGRAM(s) Oral every 12 hours  chlorhexidine 4% Liquid 1 Application(s) Topical daily  cholecalciferol 2000 Unit(s) Oral daily  levothyroxine 88 MICROGram(s) Oral daily  losartan 100 milliGRAM(s) Oral daily  polyethylene glycol 3350 17 Gram(s) Oral daily    MEDICATIONS  (PRN):  acetaminophen     Tablet .. 650 milliGRAM(s) Oral every 6 hours PRN Temp greater or equal to 38.5C (101.3F), Mild Pain (1 - 3)  senna 2 Tablet(s) Oral at bedtime PRN Constipation      IMAGING:   Recent imaging studies were reviewed.      Radiology:   CT head stable     MRI   IMPRESSION:    Redemonstrated left thalamic intraparenchymal hemorrhage with   intraventricular extension, relatively unchanged as compared to prior CT   head 7/6/2025 given differences in technique. No underlying enhancement   is identified.    Echo:   CONCLUSIONS:      1. Left ventricular cavity is normal in size. Left ventricular systolic function is normal with an ejection fraction of 60 % by Montes De Oca's method of disks. There are no regional wall motion abnormalities seen.   2. Normal right ventricular cavity size and normal right ventricular systolic function.   3. Estimated pulmonary artery systolic pressure is 27 mmHg.   4. Mild to moderate tricuspid regurgitation.   5. No pericardial effusion seen.   6. Compared to the transthoracic echocardiogram performed on 5/30/2025, the posterior leaflet of the mitral valve is not proalpsing on this study (though it was on the prior). There is trace mitral regurgitation. There is decreased tricuspid regurgitation.

## 2025-07-08 NOTE — PROGRESS NOTE ADULT - SUBJECTIVE AND OBJECTIVE BOX
******  Neurology   Thomas Zavaleta PGY2  Spectra: [phone] ([facility]), [phone] ([facility])  ******    Subjective:  Pt feeling well. No acute complaints. Daughter at bedside providing Urdu translation.     Interval Events:  Coreg increased to 25mg, MR Brain performed showing stable L thalamic hemorrhage. TTE performed.    Physical Examination:  General - non-toxic appearing, in no acute distress  Cardiovascular - no edema  Respiratory - breathing comfortably with no increased work of breathing    Neurologic Exam:  Mental status - Awake, Alert. Pt does not know month which is pts baseline. Unaware of who the president is.     Cranial nerves - VFF, EOMI, facial strength intact without asymmetry b/l    Motor - RUE drift, LUE no drift, mild drift in RLE, no drift in LLE    Sensation - Light touch intact throughout      Radiology:  TTE 7/7   1. Left ventricular cavity is normal in size. Left ventricular systolic function is normal with an ejection fraction of 60 % by Montes De Oca's method of disks. There are no regional wall motion abnormalities seen.   2. Normal right ventricular cavity size and normal right ventricular systolic function.   3. Estimated pulmonary artery systolic pressure is 27 mmHg.   4. Mild to moderate tricuspid regurgitation.   5. No pericardial effusion seen.   6. Compared to the transthoracic echocardiogram performed on 5/30/2025, the posterior leaflet of the mitral valve is not proalpsing on this study (though it was on the prior). There is trace mitral regurgitation. There is decreased tricuspid regurgitation.      < from: MR Head w/wo IV Cont (07.07.25 @ 13:04) >  Redemonstrated left thalamic intraparenchymal hemorrhage with   intraventricular extension, relatively unchanged as compared to prior CT   head 7/6/2025 given differences in technique. No underlying enhancement   is identified.    < end of copied text >      CT HEAD:    No new or increasing intracranial hemorrhage since this morning's scan at   outside hospital (separate record number, accession #[id number]).    Left thalamic and corona radiata hemorrhage with intraventricular   extension. Stable ventricular size but close follow-up advised.      CTA BRAIN:  No arterial occlusion or significant stenosis. No aneurysm or other   arterial source for the left parenchymal hemorrhage.    CTA NECK:  No arterial steno-occlusive disease or evidence of dissection.   ******  Neurology   Thomas Zavaleta PGY2  Spectra: [phone] ([facility]), [phone] ([facility])  ******    Subjective:  Pt feeling well. No acute complaints. Daughter at bedside providing Albanian translation.     Interval Events:  Coreg increased to 25mg, MR Brain performed showing stable L thalamic hemorrhage. TTE performed.    Vitals:  ICU Vital Signs Last 24 Hrs  T(C): 37 (08 Jul 2025 11:00), Max: 37 (08 Jul 2025 11:00)  T(F): 98.6 (08 Jul 2025 11:00), Max: 98.6 (08 Jul 2025 11:00)  HR: 90 (08 Jul 2025 13:00) (65 - 93)  BP: 149/86 (08 Jul 2025 13:00) (122/56 - 179/83)  BP(mean): 113 (08 Jul 2025 13:00) (81 - 119)  RR: 26 (08 Jul 2025 13:00) (16 - 29)  SpO2: 97% (08 Jul 2025 13:00) (94% - 99%)    O2 Parameters below as of 08 Jul 2025 10:59  Patient On (Oxygen Delivery Method): room air              Physical Examination:  General - non-toxic appearing, in no acute distress  Cardiovascular - no edema  Respiratory - breathing comfortably with no increased work of breathing    Neurologic Exam:  Mental status - Awake, Alert. Pt does not know month which is pts baseline. Unaware of who the president is.   Cranial nerves - VFF, EOMI, facial strength intact without asymmetry b/l  Motor - RUE drift, LUE no drift, mild drift in RLE, no drift in LLE  Sensation - Light touch intact throughout      Radiology:  TTE 7/7   1. Left ventricular cavity is normal in size. Left ventricular systolic function is normal with an ejection fraction of 60 % by Montes De Oca's method of disks. There are no regional wall motion abnormalities seen.   2. Normal right ventricular cavity size and normal right ventricular systolic function.   3. Estimated pulmonary artery systolic pressure is 27 mmHg.   4. Mild to moderate tricuspid regurgitation.   5. No pericardial effusion seen.   6. Compared to the transthoracic echocardiogram performed on 5/30/2025, the posterior leaflet of the mitral valve is not proalpsing on this study (though it was on the prior). There is trace mitral regurgitation. There is decreased tricuspid regurgitation.      MR Head w/wo IV Cont (07.07.25 @ 13:04)   Redemonstrated left thalamic intraparenchymal hemorrhage with   intraventricular extension, relatively unchanged as compared to prior CT   head 7/6/2025 given differences in technique. No underlying enhancement   is identified.      CT HEAD:    No new or increasing intracranial hemorrhage since this morning's scan at   outside hospital (separate record number, accession #[id number]).    Left thalamic and corona radiata hemorrhage with intraventricular   extension. Stable ventricular size but close follow-up advised.    CTA BRAIN:  No arterial occlusion or significant stenosis. No aneurysm or other   arterial source for the left parenchymal hemorrhage.    CTA NECK:  No arterial steno-occlusive disease or evidence of dissection.

## 2025-07-08 NOTE — PROGRESS NOTE ADULT - SUBJECTIVE AND OBJECTIVE BOX
Patient seen and examined at bedside.    --Anticoagulation--  enoxaparin Injectable 40 milliGRAM(s) SubCutaneous <User Schedule>    T(C): 36.8 (07-08-25 @ 03:00), Max: 37.1 (07-07-25 @ 11:00)  HR: 65 (07-08-25 @ 03:00) (65 - 93)  BP: 165/79 (07-08-25 @ 03:00) (122/59 - 165/79)  RR: 17 (07-08-25 @ 03:00) (13 - 35)  SpO2: 99% (07-08-25 @ 03:00) (94% - 99%)  Wt(kg): --    Exam: EOV, Ox1-2 (self and place), dysarthria, PERRL, FC, RUE drift, RUE 3/5, LUE 5/5, BLE 5/5

## 2025-07-08 NOTE — PROGRESS NOTE ADULT - ATTENDING COMMENTS
DOS 7/8  seen in NSCU with stroke team  accepted to stroke floor   Perez Young MD  Vascular Neurology  Office: [phone]

## 2025-07-09 ENCOUNTER — TRANSCRIPTION ENCOUNTER (OUTPATIENT)
Age: 85
End: 2025-07-09

## 2025-07-09 PROCEDURE — 99233 SBSQ HOSP IP/OBS HIGH 50: CPT

## 2025-07-09 PROCEDURE — 99232 SBSQ HOSP IP/OBS MODERATE 35: CPT

## 2025-07-09 RX ORDER — AMLODIPINE BESYLATE 10 MG/1
1 TABLET ORAL
Qty: 0 | Refills: 0 | DISCHARGE
Start: 2025-07-09

## 2025-07-09 RX ORDER — SENNA 187 MG
2 TABLET ORAL
Qty: 0 | Refills: 0 | DISCHARGE
Start: 2025-07-09

## 2025-07-09 RX ORDER — POLYETHYLENE GLYCOL 3350 17 G/17G
17 POWDER, FOR SOLUTION ORAL
Qty: 0 | Refills: 0 | DISCHARGE
Start: 2025-07-09

## 2025-07-09 RX ORDER — CARVEDILOL 3.12 MG/1
1 TABLET, FILM COATED ORAL
Qty: 0 | Refills: 0 | DISCHARGE
Start: 2025-07-09

## 2025-07-09 RX ORDER — LOSARTAN POTASSIUM 100 MG/1
1 TABLET, FILM COATED ORAL
Qty: 0 | Refills: 0 | DISCHARGE
Start: 2025-07-09

## 2025-07-09 RX ADMIN — AMLODIPINE BESYLATE 5 MILLIGRAM(S): 10 TABLET ORAL at 05:02

## 2025-07-09 RX ADMIN — ENOXAPARIN SODIUM 40 MILLIGRAM(S): 100 INJECTION SUBCUTANEOUS at 17:15

## 2025-07-09 RX ADMIN — Medication 2000 UNIT(S): at 11:29

## 2025-07-09 RX ADMIN — ATORVASTATIN CALCIUM 10 MILLIGRAM(S): 80 TABLET, FILM COATED ORAL at 21:02

## 2025-07-09 RX ADMIN — POLYETHYLENE GLYCOL 3350 17 GRAM(S): 17 POWDER, FOR SOLUTION ORAL at 11:28

## 2025-07-09 RX ADMIN — CARVEDILOL 25 MILLIGRAM(S): 3.12 TABLET, FILM COATED ORAL at 17:16

## 2025-07-09 RX ADMIN — LOSARTAN POTASSIUM 100 MILLIGRAM(S): 100 TABLET, FILM COATED ORAL at 05:01

## 2025-07-09 RX ADMIN — CALCIUM CARBONATE 1 TABLET(S): 750 TABLET ORAL at 11:29

## 2025-07-09 RX ADMIN — Medication 1 APPLICATION(S): at 21:03

## 2025-07-09 RX ADMIN — CARVEDILOL 25 MILLIGRAM(S): 3.12 TABLET, FILM COATED ORAL at 05:02

## 2025-07-09 RX ADMIN — Medication 88 MICROGRAM(S): at 05:01

## 2025-07-09 NOTE — DISCHARGE NOTE PROVIDER - CARE PROVIDER_API CALL
April Guillen  Nurse Practitioner Family  [address], [address]  [place] [address]  Phone: [phone]  Fax: [phone]  Follow Up Time: 2 weeks    Kartik Perry  Cardiovascular Disease  [address], [address]  [place] [address]  Phone: [phone]  Fax: [phone]  Follow Up Time: 2 weeks   April Guillen  Nurse Practitioner Family  [address], [address]  [place] [address]  Phone: [phone]  Fax: [phone]  Follow Up Time: 2 weeks    Kartik Perry  Cardiovascular Disease  [address], [address]  [place] [address]  Phone: [phone]  Fax: [phone]  Follow Up Time: 2 weeks    Fahad Ibarra  Cardiovascular Disease  [address], [address]  [place] [address]  Phone: [phone]  Fax: [phone]  Follow Up Time: 1 month

## 2025-07-09 NOTE — PROGRESS NOTE ADULT - SUBJECTIVE AND OBJECTIVE BOX
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 650 milliGRAM(s) every 6 hours PRN  amLODIPine   Tablet 5 milliGRAM(s) daily  atorvastatin 10 milliGRAM(s) at bedtime  calcium carbonate    500 mG (Tums) Chewable 1 Tablet(s) daily  carvedilol 25 milliGRAM(s) every 12 hours  chlorhexidine 4% Liquid 1 Application(s) daily  cholecalciferol 2000 Unit(s) daily  enoxaparin Injectable 40 milliGRAM(s) <User Schedule>  levothyroxine 88 MICROGram(s) daily  losartan 100 milliGRAM(s) daily  polyethylene glycol 3350 17 Gram(s) daily  senna 2 Tablet(s) at bedtime PRN      RECENT LABS - Reviewed        < from: CT Head No Cont (07.06.25 @ 09:22) >    FINDINGS:    No significant interval change in appearance of 3.1 x 1.5 cm left   thalamic acute parenchymal hemorrhage with surrounding edema.    Similar mild to moderate intraventricular extension.    Ventricles similar in size, no hydrocephalus. No midline shift or   effacement of basal cisterns.    IMPRESSION:    No significant interval change from CT brain 7/5/2025.    < end of copied text >    < from: MR Head w/wo IV Cont (07.07.25 @ 13:04) >    IMPRESSION:    Redemonstrated left thalamic intraparenchymal hemorrhage with   intraventricular extension, relatively unchanged as compared to prior CT   head 7/6/2025 given differences in technique. No underlying enhancement   is identified.    --- End of Report ---      < end of copied text >      ----------------------------------------------------------------------------------------  PHYSICAL EXAM  Constitutional - NAD, Comfortable, in bed   Chest - Breathing comfortably on room air   Cardiovascular - S1S2   Extremities - No C/C/E, No calf tenderness   Neurologic Exam -   follows commands                 Cognitive -awake, alert     Communication -speech fluent responds to questions      Motor- RUE 3/5, RLE 4/5      Psychiatric - Mood stable, Affect WNL  ----------------------------------------------------------------------------------------  ASSESSMENT/PLAN  84yFemale h/o HTN< hypothyroid with functional deficits 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  CT stable as above, MRI with left thalamic IPH  s/p FEES, regular diet   Pain - Tylenol  DVT PPX - SCDs lovenox   Rehab -    patient requires mod assist with mobility, +cognitive deficits   continue bedside therapy while admitted to prevent secondary complications of immobility, bed mobility, transfer training, progressive ambulation, equipment evaluation, ADLs   OOB throughout the day with staff, OOB to chair with meals/3 hours daily          Recommend ACUTE inpatient rehabilitation for the functional deficits consisting of 3 hours of multidisciplinary intense therapy/day x 5 days/week x 2-4 weeks depending on progress at rehabilitation facility, 24 hour RN/daily PMR physician for comorbid medical management.      Patient will be able to participate in and benefit from intense rehabilitation therapies for 3 hours a day x 5 days/week to maximize independence.     Rehab recommendations are dependent on functional progress and participation with bedside therapy     daughter reports they are planning to provide assist in the home on discharge from rehab     Will continue to follow for ongoing rehab needs and recommendations.                   35 minutes spent on total encounter  with chart review of PT/OT/SLP notes, exam, imaging, counseling and education on inpatient rehabilitation, coordination of care with rehab team and

## 2025-07-09 NOTE — PROGRESS NOTE ADULT - SUBJECTIVE AND OBJECTIVE BOX
THE PATIENT WAS SEEN AND EXAMINED BY ME WITH THE HOUSESTAFF AND STROKE TEAM DURING MORNING ROUNDS.   HPI:  HPI: 85 yo female with PMH of HTN, HLD, hypothyroidism on aspirin presenting to emergency department as transfer from [place] due to CTH finding of 3 cm acute parenchymal hemorrhage in the left corona radiata and thalamus with intraventricular hemorrhage and bilateral lateral 3rd and 4th ventricles. As per family patient was found down in shower this morning, had right sided weakness and concern for possible facial droop, altered mental status.       SUBJECTIVE: No events overnight.  No new neurologic complaints.  ROS negative unless otherwise noted.    acetaminophen     Tablet .. 650 milliGRAM(s) Oral every 6 hours PRN  amLODIPine   Tablet 5 milliGRAM(s) Oral daily  atorvastatin 10 milliGRAM(s) Oral at bedtime  calcium carbonate    500 mG (Tums) Chewable 1 Tablet(s) Chew daily  carvedilol 25 milliGRAM(s) Oral every 12 hours  chlorhexidine 4% Liquid 1 Application(s) Topical daily  cholecalciferol 2000 Unit(s) Oral daily  enoxaparin Injectable 40 milliGRAM(s) SubCutaneous <User Schedule>  levothyroxine 88 MICROGram(s) Oral daily  losartan 100 milliGRAM(s) Oral daily  polyethylene glycol 3350 17 Gram(s) Oral daily  senna 2 Tablet(s) Oral at bedtime PRN      PHYSICAL EXAM:   Vital Signs Last 24 Hrs  T(C): 36.9 (09 Jul 2025 04:50), Max: 37.4 (08 Jul 2025 15:37)  T(F): 98.5 (09 Jul 2025 04:50), Max: 99.3 (08 Jul 2025 15:37)  HR: 83 (09 Jul 2025 04:50) (76 - 100)  BP: 135/65 (09 Jul 2025 04:50) (125/70 - 179/83)  BP(mean): 113 (08 Jul 2025 13:00) (113 - 119)  RR: 18 (09 Jul 2025 04:50) (18 - 26)  SpO2: 94% (09 Jul 2025 04:50) (92% - 97%)  Patient On (Oxygen Delivery Method): room air        General: No acute distress  HEENT: EOM intact, visual fields full  Abdomen: Soft, nontender, nondistended   Extremities: No edema    NEUROLOGICAL EXAM:  Mental status: Awake, Alert. Oriented to self only. Incorrect month (baseline) and location. Follows simple commands  Cranial Nerves: No facial asymmetry, No dysarthria  Motor exam: RUE drift, RLE subtle drift. LUE/LLE antigravity without drifts  Sensation: Intact to light touch   Coordination/ Gait: No dysmetria, LUH intact and symmetric bilaterally    LABS:             IMAGING: Reviewed by me.   MRI Brain w/wo (7/7/25): Redemonstrated left thalamic intraparenchymal hemorrhage with intraventricular extension, relatively unchanged as compared to prior CT head 7/6/2025 given differences in technique. No underlying enhancement is identified.    CTH 7/6/25: No significant interval change from CT brain 7/5/2025.    CTH 75/25 @ 21:29: Grossly unchanged left thalamic and corona radiata hemorrhage with intraventricular extension.      7/5/25 @ 13:53:  CT HEAD:  -No new or increasing intracranial hemorrhage since this morning's scan at outside hospital (separate record number, accession #[id number]).  -Left thalamic and corona radiata hemorrhage with intraventricular extension. Stable ventricular size but close follow-up advised.    CTA BRAIN: No arterial occlusion or significant stenosis. No aneurysm or other arterial source for the left parenchymal hemorrhage.  CTA NECK: No arterial steno-occlusive disease or evidence of dissection. THE PATIENT WAS SEEN AND EXAMINED BY ME WITH THE HOUSESTAFF AND STROKE TEAM DURING MORNING ROUNDS.   HPI: 85 yo female with PMH of HTN, HLD, hypothyroidism on aspirin presenting to emergency department as transfer from [place] due to CTH finding of 3 cm acute parenchymal hemorrhage in the left corona radiata and thalamus with intraventricular hemorrhage and bilateral lateral 3rd and 4th ventricles. As per family patient was found down in shower this morning, had right sided weakness and concern for possible facial droop, altered mental status.       SUBJECTIVE: No events overnight.  No new neurologic complaints.  ROS negative unless otherwise noted.    acetaminophen     Tablet .. 650 milliGRAM(s) Oral every 6 hours PRN  amLODIPine   Tablet 5 milliGRAM(s) Oral daily  atorvastatin 10 milliGRAM(s) Oral at bedtime  calcium carbonate    500 mG (Tums) Chewable 1 Tablet(s) Chew daily  carvedilol 25 milliGRAM(s) Oral every 12 hours  chlorhexidine 4% Liquid 1 Application(s) Topical daily  cholecalciferol 2000 Unit(s) Oral daily  enoxaparin Injectable 40 milliGRAM(s) SubCutaneous <User Schedule>  levothyroxine 88 MICROGram(s) Oral daily  losartan 100 milliGRAM(s) Oral daily  polyethylene glycol 3350 17 Gram(s) Oral daily  senna 2 Tablet(s) Oral at bedtime PRN      PHYSICAL EXAM:   Vital Signs Last 24 Hrs  T(C): 36.9 (09 Jul 2025 04:50), Max: 37.4 (08 Jul 2025 15:37)  T(F): 98.5 (09 Jul 2025 04:50), Max: 99.3 (08 Jul 2025 15:37)  HR: 83 (09 Jul 2025 04:50) (76 - 100)  BP: 135/65 (09 Jul 2025 04:50) (125/70 - 179/83)  BP(mean): 113 (08 Jul 2025 13:00) (113 - 119)  RR: 18 (09 Jul 2025 04:50) (18 - 26)  SpO2: 94% (09 Jul 2025 04:50) (92% - 97%)  Patient On (Oxygen Delivery Method): room air      Yakut Translation provided by patient's daughter  General: No acute distress  HEENT: EOM intact, visual fields full  Abdomen: Soft, nontender, nondistended   Extremities: No edema    NEUROLOGICAL EXAM:  Mental status: Eyes open to voice. Oriented to self and to hospital (with choices). Incorrect month (baseline). Speech fluent. Follows simple commands  Cranial Nerves: No facial asymmetry, No dysarthria  Motor exam: RUE 3-4/5 with drift, RLE subtle drift. LUE/LLE antigravity without drifts  Sensation: Intact to light touch   Coordination/ Gait: No dysmetria, LUH intact and symmetric bilaterally    LABS:             IMAGING: Reviewed by me.   MRI Brain w/wo (7/7/25): Redemonstrated left thalamic intraparenchymal hemorrhage with intraventricular extension, relatively unchanged as compared to prior CT head 7/6/2025 given differences in technique. No underlying enhancement is identified.    CTH 7/6/25: No significant interval change from CT brain 7/5/2025.    CTH 75/25 @ 21:29: Grossly unchanged left thalamic and corona radiata hemorrhage with intraventricular extension.      7/5/25 @ 13:53:  CT HEAD:  -No new or increasing intracranial hemorrhage since this morning's scan at outside hospital (separate record number, accession #[id number]).  -Left thalamic and corona radiata hemorrhage with intraventricular extension. Stable ventricular size but close follow-up advised.    CTA BRAIN: No arterial occlusion or significant stenosis. No aneurysm or other arterial source for the left parenchymal hemorrhage.  CTA NECK: No arterial steno-occlusive disease or evidence of dissection.

## 2025-07-09 NOTE — DISCHARGE NOTE PROVIDER - CARE PROVIDERS DIRECT ADDRESSES
,[email or web address]

## 2025-07-09 NOTE — DISCHARGE NOTE PROVIDER - HOSPITAL COURSE
HPI:  83 yo female with PMH of HTN, HLD, hypothyroidism on aspirin presenting to emergency department as transfer from [place] due to CTH finding of 3 cm acute parenchymal hemorrhage in the left corona radiata and thalamus with intraventricular hemorrhage and bilateral lateral 3rd and 4th ventricles. As per family patient was found down in shower this morning, had right sided weakness and concern for possible facial droop, altered mental status.    Imaging:   MRI Brain w/wo (7/7/25): Redemonstrated left thalamic intraparenchymal hemorrhage with intraventricular extension, relatively unchanged as compared to prior CT head 7/6/2025 given differences in technique. No underlying enhancement is identified.    CTH 7/6/25: No significant interval change from CT brain 7/5/2025.    CTH 75/25 @ 21:29: Grossly unchanged left thalamic and corona radiata hemorrhage with intraventricular extension.    7/5/25 @ 13:53:  CT HEAD:  -No new or increasing intracranial hemorrhage since this morning's scan at outside hospital (separate record number, accession #[id number]).  -Left thalamic and corona radiata hemorrhage with intraventricular extension. Stable ventricular size but close follow-up advised.    CTA BRAIN: No arterial occlusion or significant stenosis. No aneurysm or other arterial source for the left parenchymal hemorrhage.  CTA NECK: No arterial steno-occlusive disease or evidence of dissection. HPI:  83 yo female with PMH of HTN, HLD, hypothyroidism on aspirin presenting to emergency department as transfer from [place] due to CTH finding of 3 cm acute parenchymal hemorrhage in the left corona radiata and thalamus with intraventricular hemorrhage and bilateral lateral 3rd and 4th ventricles. As per family patient was found down in shower this morning, had right sided weakness and concern for possible facial droop, altered mental status.    Imaging:   MRI Brain w/wo (7/7/25): Redemonstrated left thalamic intraparenchymal hemorrhage with intraventricular extension, relatively unchanged as compared to prior CT head 7/6/2025 given differences in technique. No underlying enhancement is identified.    CTH 7/6/25: No significant interval change from CT brain 7/5/2025.    CTH 75/25 @ 21:29: Grossly unchanged left thalamic and corona radiata hemorrhage with intraventricular extension.    7/5/25 @ 13:53:  CT HEAD:  -No new or increasing intracranial hemorrhage since this morning's scan at outside hospital (separate record number, accession #[id number]).  -Left thalamic and corona radiata hemorrhage with intraventricular extension. Stable ventricular size but close follow-up advised.    CTA BRAIN: No arterial occlusion or significant stenosis. No aneurysm or other arterial source for the left parenchymal hemorrhage.  CTA NECK: No arterial steno-occlusive disease or evidence of dissection.    IMPRESSION: R hemiparesis due to L thalamic IPH with IVH, mechanism likely hypertensive.  ANTITHROMBOTIC THERAPY: No AC/AP given hemorrhage    TTE: EF 60%, normal LA   HPI:  83 yo female with PMH of HTN, HLD, hypothyroidism on aspirin presenting to emergency department as transfer from [place] due to CTH finding of 3 cm acute parenchymal hemorrhage in the left corona radiata and thalamus with intraventricular hemorrhage and bilateral lateral 3rd and 4th ventricles. As per family patient was found down in shower this morning, had right sided weakness and concern for possible facial droop, altered mental status.    Imaging:   MRI Brain w/wo (7/7/25): Redemonstrated left thalamic intraparenchymal hemorrhage with intraventricular extension, relatively unchanged as compared to prior CT head 7/6/2025 given differences in technique. No underlying enhancement is identified.    CTH 7/6/25: No significant interval change from CT brain 7/5/2025.    CTH 75/25 @ 21:29: Grossly unchanged left thalamic and corona radiata hemorrhage with intraventricular extension.    7/5/25 @ 13:53:  CT HEAD:  -No new or increasing intracranial hemorrhage since this morning's scan at outside hospital (separate record number, accession #[id number]).  -Left thalamic and corona radiata hemorrhage with intraventricular extension. Stable ventricular size but close follow-up advised.    CTA BRAIN: No arterial occlusion or significant stenosis. No aneurysm or other arterial source for the left parenchymal hemorrhage.  CTA NECK: No arterial steno-occlusive disease or evidence of dissection.    IMPRESSION: R hemiparesis due to L thalamic IPH with IVH, mechanism likely hypertensive.  ANTITHROMBOTIC THERAPY: No AC/AP given hemorrhage  Repeat MRI Brain w/wo in 4-6 weeks to eval for underlying lesion or malformation    TTE: EF 60%, normal LA  BLE dopplers (7/5): no dvt  R hand xray performed due to fall, no displaced fractures, middle finger possible underlying ligamentous or tendinous injury  LDL 79, continue home statin, no high intensity statin indicated as mechanism is nonatherosclerotic.    Urinalysis positive for UTI on 7/11, started on Cefpodoxime 100mg BID x 5 days (7/11 - 7/15)    Disposition: Patient evaluated by PT/OT/PMR and recommended AR. Medically cleared for discharge. HPI:  83 yo female with PMH of HTN, HLD, hypothyroidism on aspirin presenting to emergency department as transfer from [place] due to CTH finding of 3 cm acute parenchymal hemorrhage in the left corona radiata and thalamus with intraventricular hemorrhage and bilateral lateral 3rd and 4th ventricles. As per family patient was found down in shower this morning, had right sided weakness and concern for possible facial droop, altered mental status.    Imaging:   MRI Brain w/wo (7/7/25): Redemonstrated left thalamic intraparenchymal hemorrhage with intraventricular extension, relatively unchanged as compared to prior CT head 7/6/2025 given differences in technique. No underlying enhancement is identified.    CTH 7/6/25: No significant interval change from CT brain 7/5/2025.    CTH 75/25 @ 21:29: Grossly unchanged left thalamic and corona radiata hemorrhage with intraventricular extension.    7/5/25 @ 13:53:  CT HEAD:  -No new or increasing intracranial hemorrhage since this morning's scan at outside hospital (separate record number, accession #[id number]).  -Left thalamic and corona radiata hemorrhage with intraventricular extension. Stable ventricular size but close follow-up advised.    CTA BRAIN: No arterial occlusion or significant stenosis. No aneurysm or other arterial source for the left parenchymal hemorrhage.  CTA NECK: No arterial steno-occlusive disease or evidence of dissection.    IMPRESSION: R hemiparesis due to L thalamic IPH with IVH, mechanism likely hypertensive.    ANTITHROMBOTIC THERAPY: No AC/AP given hemorrhage  Repeat MRI Brain w/wo in 4-6 weeks to eval for underlying lesion or malformation    TTE: EF 60%, normal LA  BLE dopplers (7/5): no dvt  R hand xray performed due to fall, no displaced fractures, middle finger possible underlying ligamentous or tendinous injury  LDL 79, continue home statin, no high intensity statin indicated as mechanism is nonatherosclerotic.    Urinalysis positive for UTI on 7/11, started on Cefpodoxime 100mg BID x 5 days (7/11 - 7/15)    Disposition: Patient evaluated by PT/OT/PMR and recommended AR. Medically cleared for discharge.   HPI:  83 yo female with PMH of HTN, HLD, hypothyroidism on aspirin presenting to emergency department as transfer from [place] due to CTH finding of 3 cm acute parenchymal hemorrhage in the left corona radiata and thalamus with intraventricular hemorrhage and bilateral lateral 3rd and 4th ventricles. As per family patient was found down in shower this morning, had right sided weakness and concern for possible facial droop, altered mental status.    Imaging:   MRI Brain w/wo (7/7/25): Redemonstrated left thalamic intraparenchymal hemorrhage with intraventricular extension, relatively unchanged as compared to prior CT head 7/6/2025 given differences in technique. No underlying enhancement is identified.    CTH 7/6/25: No significant interval change from CT brain 7/5/2025.    CTH 75/25 @ 21:29: Grossly unchanged left thalamic and corona radiata hemorrhage with intraventricular extension.    7/5/25 @ 13:53:  CT HEAD:  -No new or increasing intracranial hemorrhage since this morning's scan at outside hospital (separate record number, accession #[id number]).  -Left thalamic and corona radiata hemorrhage with intraventricular extension. Stable ventricular size but close follow-up advised.    CTA BRAIN: No arterial occlusion or significant stenosis. No aneurysm or other arterial source for the left parenchymal hemorrhage.  CTA NECK: No arterial steno-occlusive disease or evidence of dissection.    IMPRESSION: R hemiparesis due to L thalamic IPH with IVH, mechanism likely chronic hypertension.    ANTITHROMBOTIC THERAPY: No AC/AP given hemorrhage  Repeat MRI Brain w/wo in 4-6 weeks to eval for underlying lesion or malformation    TTE: EF 60%, normal LA  BLE dopplers (7/5): no dvt  R hand xray performed due to fall, no displaced fractures, middle finger possible underlying ligamentous or tendinous injury  LDL 79, continue home statin, no high intensity statin indicated as mechanism is nonatherosclerotic.    Urinalysis positive for UTI on 7/11, started on Cefpodoxime 100mg BID x 5 days (7/11 - 7/15)    Disposition: Patient evaluated by PT/OT/PMR and recommended AR. Medically cleared for discharge.

## 2025-07-09 NOTE — PROGRESS NOTE ADULT - SUBJECTIVE AND OBJECTIVE BOX
DATE OF SERVICE: 07-09-25 @ 10:37    Patient is a 84y old  Female who presents with a chief complaint of IPH w/ IVH (09 Jul 2025 08:35)      INTERVAL HISTORY: in no acute distress, resting in bed, family at bedside    REVIEW OF SYSTEMS:  CONSTITUTIONAL: No weakness  EYES/ENT: No visual changes;  No throat pain   NECK: No pain or stiffness  RESPIRATORY: No cough, wheezing; No shortness of breath  CARDIOVASCULAR: No chest pain or palpitations  GASTROINTESTINAL: No abdominal  pain. No nausea, vomiting, or hematemesis  GENITOURINARY: No dysuria, frequency or hematuria  NEUROLOGICAL: No stroke like symptoms  SKIN: No rashes    TELEMETRY Personally reviewed: SR [id number]  	  MEDICATIONS:  amLODIPine   Tablet 5 milliGRAM(s) Oral daily  carvedilol 25 milliGRAM(s) Oral every 12 hours  losartan 100 milliGRAM(s) Oral daily        PHYSICAL EXAM:  T(C): 36.7 (07-09-25 @ 08:58), Max: 37.4 (07-08-25 @ 15:37)  HR: 78 (07-09-25 @ 08:58) (76 - 100)  BP: 140/73 (07-09-25 @ 08:58) (125/70 - 179/83)  RR: 20 (07-09-25 @ 08:58) (18 - 26)  SpO2: 98% (07-09-25 @ 08:58) (92% - 98%)  Wt(kg): --  I&O's Summary    08 Jul 2025 07:01  -  09 Jul 2025 07:00  --------------------------------------------------------  IN: 520 mL / OUT: 1450 mL / NET: -930 mL    09 Jul 2025 07:01  -  09 Jul 2025 10:37  --------------------------------------------------------  IN: 180 mL / OUT: 0 mL / NET: 180 mL          Appearance: In no distress	  HEENT:    PERRL, EOMI	  Cardiovascular:  S1 S2, No JVD  Respiratory: Lungs clear to auscultation	  Gastrointestinal:  Soft, Non-tender, + BS	  Vascularature:  No edema of LE  Psychiatric: Appropriate affect   Neuro: no acute focal deficits                     Labs personally reviewed      ASSESSMENT/PLAN: 	    84F on ASA81, PMHx HTN, HLD pt found down, no external signs of trauma @ CTH w/Lt BG IPH, IVH , DDAVP + keppra given at .    Problem 1: ICH  - CT head 7/5 with Left thalamic and corona radiata hemorrhage with intraventricular extension  - Interval CTH stable hemorrhage, CTA head and neck negative  - Appreciate NSICU and Neurosurgery recs  - Seizure ppx: Keppra 500mg BID x 7 days (End date: 7/11/2025)    Problem 2: LE edema  - TTE (OP) 5/2025 - EF 65-70%, mild grade 1 DD, LA mod dilated, lipomatous intratrial septum, ascending aorta 3.8cm, mod-severe TR, mod MR, calcified mitral valve with prolapse of posterior mitral leaflet, mild-mod AR and SC  -- findings stable compared to TTE 2/2024  - Duplex US LE 5/2025 - no evidence of DVT  - Duplex US LE 7/5/25 - no evidence of DVT  - TTE 7/7 EF 60%, no rmwa. Compared to prior echo, the posterior leaflet of the mitral valve is not proalpsing on this study (though it was on the prior). There is trace mitral regurgitation. There is decreased tricuspid regurgitation.    Problem 3: HTN  - At home on candesartan 32 mg, metoprolol 50mg BID & Lasix 20mg   - In hospital, c/w Coreg 12.5mg BID, Losartan 100mg QD    Problem 4: HLD  - On simvastatin 10mg QD at home  - In hospital, c/w Lipitor 10mg     Follows OP cardio Dr. Jeffrey Spivak Iolani Behrbom, AG-NP   Fahad Ibarra DO [facility]  Cardiovascular Medicine  [address], [address]  Available through call or text on [employer]  Office: [phone]

## 2025-07-09 NOTE — DISCHARGE NOTE PROVIDER - PROVIDER TOKENS
PROVIDER:[TOKEN:[[id number]],FOLLOWUP:[2 weeks]],PROVIDER:[TOKEN:[[id number]],FOLLOWUP:[2 weeks]] PROVIDER:[TOKEN:[[id number]],FOLLOWUP:[2 weeks]],PROVIDER:[TOKEN:[[id number]],FOLLOWUP:[2 weeks]],PROVIDER:[TOKEN:[[id number]],FOLLOWUP:[1 month]]

## 2025-07-09 NOTE — DISCHARGE NOTE PROVIDER - NSDCCPCAREPLAN_GEN_ALL_CORE_FT
PRINCIPAL DISCHARGE DIAGNOSIS  Diagnosis: Intraparenchymal hemorrhage of brain  Assessment and Plan of Treatment: Your imaging revealed a brain bleed on the left side. Avoid aspirin, NSAIDs or blood thinners until cleared by your neurologist. Follow up with your neurologist and cardiologist (high blood pressure management) 2-4 weeks after dischrge from rehab. Continue taking medications as prescribed. Monitor your blood pressure. Reduce fat, cholesterol and salt in your diet. Increase intake of fruits and vegetables. Limit alcohol to minimum and do not smoke. You may be at risk for falling, make changes to your home to help you walk easier. Keep up to date on vaccinations.  If you experience any symptoms of facial drooping, slurred speech, arm or leg weakness, severe headache, vision changes or any worsening symptoms, notify provider immediatley and return to ER.     PRINCIPAL DISCHARGE DIAGNOSIS  Diagnosis: Intraparenchymal hemorrhage of brain  Assessment and Plan of Treatment: Your imaging revealed a brain bleed on the left side. Avoid aspirin, NSAIDs or blood thinners until cleared by your neurologist. Follow up with your neurologist and cardiologist (high blood pressure management) 2-4 weeks after dischrge from rehab. Continue taking medications as prescribed. Monitor your blood pressure. Reduce fat, cholesterol and salt in your diet. Increase intake of fruits and vegetables. Limit alcohol to minimum and do not smoke. You may be at risk for falling, make changes to your home to help you walk easier. Keep up to date on vaccinations.  If you experience any symptoms of facial drooping, slurred speech, arm or leg weakness, severe headache, vision changes or any worsening symptoms, notify provider immediatley and return to ER.      SECONDARY DISCHARGE DIAGNOSES  Diagnosis: Acute UTI  Assessment and Plan of Treatment: You were started on Cefpodoxime twice a day for 5 days (7/11-7/15) for a UTI

## 2025-07-09 NOTE — PROGRESS NOTE ADULT - ASSESSMENT
ASSESSMENT: 84 year old female with PMH of HTN, HLD, hypothyroidism on aspirin presenting to emergency department as transfer from [place] due to CTH finding of 3 cm acute parenchymal hemorrhage in the left corona radiata and thalamus with intraventricular hemorrhage and bilateral lateral 3rd and 4th ventricles. Patient initially presented to  after being found down with right sided weakness and concern for possible facial droop and altered mental status. At outside hospital patient received DDAVP and Keppra 500mg.    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    NEURO: Neuro exam stable. Continue close monitoring for neurologic deterioration. Keep strict normotension. LDL 79, continue home statin, no high intensity statin indicated as mechanism is nonatherosclerotic. MRI Brain w/wo, results above. Repeat MRI Brain w/wo in 4-6 weeks to eval for underlying lesion or malformation. Physical therapy/Occupational therapy recommend AR    ANTITHROMBOTIC THERAPY: No AC/AP given hemorrhage    PULMONARY: protecting airway, saturating well     CARDIOVASCULAR: TTE: EF 60%, normal LA. Continue cardiac monitoring, no events. Cardiology recs appreciated for HTN. Continue coreg 25mg BID, losartan 100mg daily, amlodipine 5mg daily                             SBP goal: < 140    GASTROINTESTINAL:  dysphagia screen initially failed.  S/S eval 7/6 rec FEES, placed on diet, tolerating well       Diet: Regular    RENAL: BUN/Cr stable, good urine output      Na Goal: Greater than 135     Quintero: no    HEMATOLOGY: H/H stable, Platelets normal. BLE dopplers (7/5): no dvt.      DVT ppx:  LMWH started 7/7    ID: afebrile, no leukocytosis     OTHER: R hand xray performed due to fall, no displaced fractures, middle finger possible underlying ligamentous or tendinous injury    DISPOSITION: AR per PT/OT eval once stable and workup is complete      CORE MEASURES:        Admission NIHSS: 8     TPA: [] YES [x] NO      LDL/HDL: 79/56     Depression Screen: p     Statin Therapy: yes     Dysphagia Screen: [] PASS [x] FAIL initially     Smoking [] YES [x] NO      Afib [] YES [x] NO     Stroke Education [x] YES [] NO    Obtain screening lower extremity venous ultrasound in patients who meet 1 or more of the following criteria as patient is high risk for DVT/PE on admission:   [] History of DVT/PE  []Hypercoagulable states (Factor V Leiden, Cancer, OCP, etc. )  []Prolonged immobility (hemiplegia/hemiparesis/post operative or any other extended immobilization)  [] Transferred from outside facility (Rehab or Long term care)  [] Age </= to 50 ASSESSMENT: 84 year old female with PMH of HTN, HLD, hypothyroidism on aspirin presenting to emergency department as transfer from [place] due to CTH finding of 3 cm acute parenchymal hemorrhage in the left corona radiata and thalamus with intraventricular hemorrhage and bilateral lateral 3rd and 4th ventricles. Patient initially presented to  after being found down with right sided weakness and concern for possible facial droop and altered mental status. At outside hospital patient received DDAVP and Keppra 500mg.    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    NEURO: Neuro exam stable. Continue close monitoring for neurologic deterioration. Keep normotension. LDL 79, continue home statin, no high intensity statin indicated as mechanism is nonatherosclerotic. MRI Brain w/wo, results above. Repeat MRI Brain w/wo in 4-6 weeks to eval for underlying lesion or malformation. Physical therapy/Occupational therapy recommend AR    ANTITHROMBOTIC THERAPY: No AC/AP given hemorrhage    PULMONARY: protecting airway, saturating well     CARDIOVASCULAR: TTE: EF 60%, normal LA. Continue inpatient cardiac monitoring, no events. Cardiology recs appreciated for HTN. Continue coreg 25mg BID, losartan 100mg daily, amlodipine 5mg daily                             SBP goal: < 140    GASTROINTESTINAL:  dysphagia screen initially failed.  S/S eval 7/6 rec FEES, placed on diet, tolerating well       Diet: Regular    RENAL: BUN/Cr stable, good urine output      Na Goal: Greater than 135     Quintero: no    HEMATOLOGY: H/H stable, Platelets normal. BLE dopplers (7/5): no dvt.      DVT ppx:  LMWH started 7/7    ID: afebrile, no leukocytosis     OTHER: R hand xray performed due to fall, no displaced fractures, middle finger possible underlying ligamentous or tendinous injury  Case and plan discussed with patient and and family at bedside, all questions addressed    DISPOSITION: AR per PT/OT eval, medically cleared    CORE MEASURES:        Admission NIHSS: 8     TPA: [] YES [x] NO      LDL/HDL: 79/56     Depression Screen: p     Statin Therapy: yes     Dysphagia Screen: [] PASS [x] FAIL initially     Smoking [] YES [x] NO      Afib [] YES [x] NO     Stroke Education [x] YES [] NO    Obtain screening lower extremity venous ultrasound in patients who meet 1 or more of the following criteria as patient is high risk for DVT/PE on admission:   [] History of DVT/PE  []Hypercoagulable states (Factor V Leiden, Cancer, OCP, etc. )  []Prolonged immobility (hemiplegia/hemiparesis/post operative or any other extended immobilization)  [] Transferred from outside facility (Rehab or Long term care)  [] Age </= to 50

## 2025-07-09 NOTE — DISCHARGE NOTE PROVIDER - NSDCMRMEDTOKEN_GEN_ALL_CORE_FT
amLODIPine 5 mg oral tablet: 1 tab(s) orally once a day  calcium carbonate 650 mg oral tablet: 1 application orally once a day  carvedilol 25 mg oral tablet: 1 tab(s) orally every 12 hours  cholecalciferol: 2,000 unit(s) orally once a day  furosemide 20 mg oral tablet: 1 tab(s) orally once a day  losartan 100 mg oral tablet: 1 tab(s) orally once a day  polyethylene glycol 3350 oral powder for reconstitution: 17 gram(s) orally once a day  senna leaf extract oral tablet: 2 tab(s) orally once a day (at bedtime) As needed Constipation  simvastatin 10 mg oral tablet: 1 tab(s) orally once a day  Synthroid 88 mcg (0.088 mg) oral tablet: 1 tab(s) orally once a day before breakfast   amLODIPine 5 mg oral tablet: 1 tab(s) orally once a day  calcium carbonate 650 mg oral tablet: 1 application orally once a day  carvedilol 25 mg oral tablet: 1 tab(s) orally every 12 hours  cefpodoxime 100 mg oral tablet: 1 tab(s) orally every 12 hours end on 07/16  cholecalciferol: 2,000 unit(s) orally once a day  losartan 100 mg oral tablet: 1 tab(s) orally once a day  ocular lubricant preservative-free ophthalmic solution: 1 drop(s) to each affected eye 4 times a day As needed Dry Eyes  polyethylene glycol 3350 oral powder for reconstitution: 17 gram(s) orally once a day  senna leaf extract oral tablet: 2 tab(s) orally once a day (at bedtime) As needed Constipation  simvastatin 10 mg oral tablet: 1 tab(s) orally once a day  Synthroid 88 mcg (0.088 mg) oral tablet: 1 tab(s) orally once a day before breakfast  tamsulosin 0.4 mg oral capsule: 1 cap(s) orally once a day (at bedtime)

## 2025-07-10 PROCEDURE — 99233 SBSQ HOSP IP/OBS HIGH 50: CPT

## 2025-07-10 RX ORDER — SENNA 187 MG
2 TABLET ORAL AT BEDTIME
Refills: 0 | Status: DISCONTINUED | OUTPATIENT
Start: 2025-07-10 | End: 2025-07-14

## 2025-07-10 RX ADMIN — Medication 2000 UNIT(S): at 11:19

## 2025-07-10 RX ADMIN — LOSARTAN POTASSIUM 100 MILLIGRAM(S): 100 TABLET, FILM COATED ORAL at 05:11

## 2025-07-10 RX ADMIN — Medication 88 MICROGRAM(S): at 05:10

## 2025-07-10 RX ADMIN — POLYETHYLENE GLYCOL 3350 17 GRAM(S): 17 POWDER, FOR SOLUTION ORAL at 11:19

## 2025-07-10 RX ADMIN — ENOXAPARIN SODIUM 40 MILLIGRAM(S): 100 INJECTION SUBCUTANEOUS at 17:29

## 2025-07-10 RX ADMIN — AMLODIPINE BESYLATE 5 MILLIGRAM(S): 10 TABLET ORAL at 05:11

## 2025-07-10 RX ADMIN — Medication 2 TABLET(S): at 21:45

## 2025-07-10 RX ADMIN — CARVEDILOL 25 MILLIGRAM(S): 3.12 TABLET, FILM COATED ORAL at 17:32

## 2025-07-10 RX ADMIN — Medication 1 APPLICATION(S): at 21:59

## 2025-07-10 RX ADMIN — ATORVASTATIN CALCIUM 10 MILLIGRAM(S): 80 TABLET, FILM COATED ORAL at 21:45

## 2025-07-10 RX ADMIN — CARVEDILOL 25 MILLIGRAM(S): 3.12 TABLET, FILM COATED ORAL at 05:12

## 2025-07-10 NOTE — PROGRESS NOTE ADULT - SUBJECTIVE AND OBJECTIVE BOX
DATE OF SERVICE: 07-10-25 @ 23:10    Patient is a 84y old  Female who presents with a chief complaint of IPH w/ IVH (10 Jul 2025 08:01)      INTERVAL HISTORY: feels ok    TELEMETRY Personally reviewed: no events  	  MEDICATIONS:  amLODIPine   Tablet 5 milliGRAM(s) Oral daily  carvedilol 25 milliGRAM(s) Oral every 12 hours  losartan 100 milliGRAM(s) Oral daily        PHYSICAL EXAM:  T(C): 36.5 (07-10-25 @ 20:42), Max: 36.8 (07-10-25 @ 08:53)  HR: 81 (07-10-25 @ 20:42) (69 - 100)  BP: 105/64 (07-10-25 @ 20:42) (105/64 - 130/70)  RR: 18 (07-10-25 @ 20:42) (18 - 18)  SpO2: 95% (07-10-25 @ 20:42) (95% - 97%)  Wt(kg): --  I&O's Summary    09 Jul 2025 07:01  -  10 Jul 2025 07:00  --------------------------------------------------------  IN: 800 mL / OUT: 700 mL / NET: 100 mL    10 Jul 2025 07:01  -  10 Jul 2025 23:10  --------------------------------------------------------  IN: 340 mL / OUT: 0 mL / NET: 340 mL          Appearance: In no distress	  HEENT:    PERRL, EOMI	  Cardiovascular:  S1 S2, No JVD  Respiratory: Lungs clear to auscultation	  Gastrointestinal:  Soft, Non-tender, + BS	  Vascularature:  No edema of LE  Psychiatric: Appropriate affect   Neuro: no acute focal deficits             Labs personally reviewed         ASSESSMENT/PLAN: 	    84F on ASA81, PMHx HTN, HLD pt found down, no external signs of trauma @ CTH w/Lt BG IPH, IVH , DDAVP + keppra given at .    Problem 1: ICH  - CT head 7/5 with Left thalamic and corona radiata hemorrhage with intraventricular extension  - Interval CTH stable hemorrhage, CTA head and neck negative  - Appreciate NSICU and Neurosurgery recs  - Seizure ppx: Keppra 500mg BID x 7 days (End date: 7/11/2025)    Problem 2: LE edema  - TTE (OP) 5/2025 - EF 65-70%, mild grade 1 DD, LA mod dilated, lipomatous intratrial septum, ascending aorta 3.8cm, mod-severe TR, mod MR, calcified mitral valve with prolapse of posterior mitral leaflet, mild-mod AR and GA  -- findings stable compared to TTE 2/2024  - Duplex US LE 5/2025 - no evidence of DVT  - Duplex US LE 7/5/25 - no evidence of DVT  - TTE 7/7 EF 60%, no rmwa. Compared to prior echo, the posterior leaflet of the mitral valve is not proalpsing on this study (though it was on the prior). There is trace mitral regurgitation. There is decreased tricuspid regurgitation.    Problem 3: HTN  - At home on candesartan 32 mg, metoprolol 50mg BID & Lasix 20mg   - In hospital, c/w Coreg 12.5mg BID, Losartan 100mg QD    Problem 4: HLD  - On simvastatin 10mg QD at home  - In hospital, c/w Lipitor 10mg     Follows OP cardio Dr. Kartik Ibarra, DO [facility]  Cardiovascular Medicine  [address], [address]  Office: [phone]  Available via Text/call on Microsoft Teams

## 2025-07-10 NOTE — PROGRESS NOTE ADULT - SUBJECTIVE AND OBJECTIVE BOX
THE PATIENT WAS SEEN AND EXAMINED BY ME WITH THE HOUSESTAFF AND STROKE TEAM DURING MORNING ROUNDS.   HPI: 83 yo female with PMH of HTN, HLD, hypothyroidism on aspirin presenting to emergency department as transfer from [place] due to CTH finding of 3 cm acute parenchymal hemorrhage in the left corona radiata and thalamus with intraventricular hemorrhage and bilateral lateral 3rd and 4th ventricles. As per family patient was found down in shower this morning, had right sided weakness and concern for possible facial droop, altered mental status.        SUBJECTIVE: No events overnight.  No new neurologic complaints.  ROS negative unless otherwise noted.    acetaminophen     Tablet .. 650 milliGRAM(s) Oral every 6 hours PRN  amLODIPine   Tablet 5 milliGRAM(s) Oral daily  atorvastatin 10 milliGRAM(s) Oral at bedtime  calcium carbonate    500 mG (Tums) Chewable 1 Tablet(s) Chew daily  carvedilol 25 milliGRAM(s) Oral every 12 hours  chlorhexidine 4% Liquid 1 Application(s) Topical daily  cholecalciferol 2000 Unit(s) Oral daily  enoxaparin Injectable 40 milliGRAM(s) SubCutaneous <User Schedule>  levothyroxine 88 MICROGram(s) Oral daily  losartan 100 milliGRAM(s) Oral daily  polyethylene glycol 3350 17 Gram(s) Oral daily  senna 2 Tablet(s) Oral at bedtime PRN      PHYSICAL EXAM:   Vital Signs Last 24 Hrs  T(C): 36.6 (10 Jul 2025 04:43), Max: 37.4 (09 Jul 2025 13:32)  T(F): 97.9 (10 Jul 2025 04:43), Max: 99.3 (09 Jul 2025 13:32)  HR: 100 (10 Jul 2025 04:43) (74 - 100)  BP: 116/68 (10 Jul 2025 04:43) (101/62 - 140/73)  BP(mean): --  RR: 18 (10 Jul 2025 04:43) (18 - 20)  SpO2: 96% (10 Jul 2025 04:43) (95% - 98%)  Patient On (Oxygen Delivery Method): room air      Somali Translation provided by patient's daughter  General: No acute distress  HEENT: EOM intact, visual fields full  Abdomen: Soft, nontender, nondistended   Extremities: No edema    NEUROLOGICAL EXAM:  Mental status: Eyes open to voice. Oriented to self and to hospital (with choices). Incorrect month (baseline). Speech fluent. Follows simple commands  Cranial Nerves: No facial asymmetry, No dysarthria  Motor exam: RUE 3-4/5 with drift, RLE subtle drift. LUE/LLE antigravity without drifts  Sensation: Intact to light touch   Coordination/ Gait: No dysmetria, LUH intact and symmetric bilaterally      LABS:         IMAGING: Reviewed by me.   MRI Brain w/wo (7/7/25): Redemonstrated left thalamic intraparenchymal hemorrhage with intraventricular extension, relatively unchanged as compared to prior CT head 7/6/2025 given differences in technique. No underlying enhancement is identified.    CTH 7/6/25: No significant interval change from CT brain 7/5/2025.    CTH 75/25 @ 21:29: Grossly unchanged left thalamic and corona radiata hemorrhage with intraventricular extension.      7/5/25 @ 13:53:  CT HEAD:  -No new or increasing intracranial hemorrhage since this morning's scan at outside hospital (separate record number, accession #[id number]).  -Left thalamic and corona radiata hemorrhage with intraventricular extension. Stable ventricular size but close follow-up advised.    CTA BRAIN: No arterial occlusion or significant stenosis. No aneurysm or other arterial source for the left parenchymal hemorrhage.  CTA NECK: No arterial steno-occlusive disease or evidence of dissection.

## 2025-07-10 NOTE — PROGRESS NOTE ADULT - ASSESSMENT
ASSESSMENT: 84 year old female with PMH of HTN, HLD, hypothyroidism on aspirin presenting to emergency department as transfer from [place] due to CTH finding of 3 cm acute parenchymal hemorrhage in the left corona radiata and thalamus with intraventricular hemorrhage and bilateral lateral 3rd and 4th ventricles. Patient initially presented to  after being found down with right sided weakness and concern for possible facial droop and altered mental status. At outside hospital patient received DDAVP and Keppra 500mg.    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    NEURO: Neuro exam stable. Continue close monitoring for neurologic deterioration. Keep normotension. LDL 79, continue home statin, no high intensity statin indicated as mechanism is nonatherosclerotic. MRI Brain w/wo, results above. Repeat MRI Brain w/wo in 4-6 weeks to eval for underlying lesion or malformation. Physical therapy/Occupational therapy recommend AR    ANTITHROMBOTIC THERAPY: No AC/AP given hemorrhage    PULMONARY: protecting airway, saturating well     CARDIOVASCULAR: TTE: EF 60%, normal LA. Continue inpatient cardiac monitoring, no events. Cardiology recs appreciated for HTN. Continue coreg 25mg BID, losartan 100mg daily, amlodipine 5mg daily                             SBP goal: < 140    GASTROINTESTINAL:  dysphagia screen initially failed.  S/S eval 7/6 rec FEES, placed on diet, tolerating well       Diet: Regular    RENAL: BUN/Cr stable, good urine output      Na Goal: Greater than 135     Quintero: no    HEMATOLOGY: H/H stable, Platelets normal. BLE dopplers (7/5): no dvt.      DVT ppx:  LMWH started 7/7    ID: afebrile, no leukocytosis     OTHER: R hand xray performed due to fall, no displaced fractures, middle finger possible underlying ligamentous or tendinous injury  Case and plan discussed with patient and and family at bedside, all questions addressed    DISPOSITION: AR per PT/OT eval, medically cleared    CORE MEASURES:        Admission NIHSS: 8     TPA: [] YES [x] NO      LDL/HDL: 79/56     Depression Screen: p     Statin Therapy: yes     Dysphagia Screen: [] PASS [x] FAIL initially     Smoking [] YES [x] NO      Afib [] YES [x] NO     Stroke Education [x] YES [] NO    Obtain screening lower extremity venous ultrasound in patients who meet 1 or more of the following criteria as patient is high risk for DVT/PE on admission:   [] History of DVT/PE  []Hypercoagulable states (Factor V Leiden, Cancer, OCP, etc. )  []Prolonged immobility (hemiplegia/hemiparesis/post operative or any other extended immobilization)  [] Transferred from outside facility (Rehab or Long term care)  [] Age </= to 50

## 2025-07-11 LAB
ALBUMIN SERPL ELPH-MCNC: 3.4 G/DL — SIGNIFICANT CHANGE UP (ref 3.3–5)
ALP SERPL-CCNC: 55 U/L — SIGNIFICANT CHANGE UP (ref 40–120)
ALT FLD-CCNC: 12 U/L — SIGNIFICANT CHANGE UP (ref 10–45)
ANION GAP SERPL CALC-SCNC: 13 MMOL/L — SIGNIFICANT CHANGE UP (ref 5–17)
APPEARANCE UR: ABNORMAL
AST SERPL-CCNC: 13 U/L — SIGNIFICANT CHANGE UP (ref 10–40)
BACTERIA # UR AUTO: ABNORMAL /HPF
BASOPHILS # BLD AUTO: 0.02 K/UL — SIGNIFICANT CHANGE UP (ref 0–0.2)
BASOPHILS NFR BLD AUTO: 0.3 % — SIGNIFICANT CHANGE UP (ref 0–2)
BILIRUB SERPL-MCNC: 1.1 MG/DL — SIGNIFICANT CHANGE UP (ref 0.2–1.2)
BILIRUB UR-MCNC: NEGATIVE — SIGNIFICANT CHANGE UP
BUN SERPL-MCNC: 32 MG/DL — HIGH (ref 7–23)
CALCIUM SERPL-MCNC: 8.9 MG/DL — SIGNIFICANT CHANGE UP (ref 8.4–10.5)
CAST: 1 /LPF — SIGNIFICANT CHANGE UP (ref 0–4)
CHLORIDE SERPL-SCNC: 97 MMOL/L — SIGNIFICANT CHANGE UP (ref 96–108)
CO2 SERPL-SCNC: 19 MMOL/L — LOW (ref 22–31)
COLOR SPEC: YELLOW — SIGNIFICANT CHANGE UP
CREAT SERPL-MCNC: 0.63 MG/DL — SIGNIFICANT CHANGE UP (ref 0.5–1.3)
DIFF PNL FLD: ABNORMAL
EGFR: 89 ML/MIN/1.73M2 — SIGNIFICANT CHANGE UP
EGFR: 89 ML/MIN/1.73M2 — SIGNIFICANT CHANGE UP
EOSINOPHIL # BLD AUTO: 0.03 K/UL — SIGNIFICANT CHANGE UP (ref 0–0.5)
EOSINOPHIL NFR BLD AUTO: 0.4 % — SIGNIFICANT CHANGE UP (ref 0–6)
GLUCOSE SERPL-MCNC: 102 MG/DL — HIGH (ref 70–99)
GLUCOSE UR QL: NEGATIVE MG/DL — SIGNIFICANT CHANGE UP
HCT VFR BLD CALC: 36.8 % — SIGNIFICANT CHANGE UP (ref 34.5–45)
HGB BLD-MCNC: 12.3 G/DL — SIGNIFICANT CHANGE UP (ref 11.5–15.5)
IMM GRANULOCYTES # BLD AUTO: 0.04 K/UL — SIGNIFICANT CHANGE UP (ref 0–0.07)
IMM GRANULOCYTES NFR BLD AUTO: 0.5 % — SIGNIFICANT CHANGE UP (ref 0–0.9)
KETONES UR QL: NEGATIVE MG/DL — SIGNIFICANT CHANGE UP
LEUKOCYTE ESTERASE UR-ACNC: ABNORMAL
LYMPHOCYTES # BLD AUTO: 0.89 K/UL — LOW (ref 1–3.3)
LYMPHOCYTES NFR BLD AUTO: 11.2 % — LOW (ref 13–44)
MCHC RBC-ENTMCNC: 29.1 PG — SIGNIFICANT CHANGE UP (ref 27–34)
MCHC RBC-ENTMCNC: 33.4 G/DL — SIGNIFICANT CHANGE UP (ref 32–36)
MCV RBC AUTO: 87.2 FL — SIGNIFICANT CHANGE UP (ref 80–100)
MONOCYTES # BLD AUTO: 0.93 K/UL — HIGH (ref 0–0.9)
MONOCYTES NFR BLD AUTO: 11.7 % — SIGNIFICANT CHANGE UP (ref 2–14)
NEUTROPHILS # BLD AUTO: 6.05 K/UL — SIGNIFICANT CHANGE UP (ref 1.8–7.4)
NEUTROPHILS NFR BLD AUTO: 75.9 % — SIGNIFICANT CHANGE UP (ref 43–77)
NITRITE UR-MCNC: NEGATIVE — SIGNIFICANT CHANGE UP
NRBC # BLD AUTO: 0 K/UL — SIGNIFICANT CHANGE UP (ref 0–0)
NRBC # FLD: 0 K/UL — SIGNIFICANT CHANGE UP (ref 0–0)
NRBC BLD AUTO-RTO: 0 /100 WBCS — SIGNIFICANT CHANGE UP (ref 0–0)
PH UR: 5.5 — SIGNIFICANT CHANGE UP (ref 5–8)
PLATELET # BLD AUTO: 170 K/UL — SIGNIFICANT CHANGE UP (ref 150–400)
PMV BLD: 10.8 FL — SIGNIFICANT CHANGE UP (ref 7–13)
POTASSIUM SERPL-MCNC: 3.9 MMOL/L — SIGNIFICANT CHANGE UP (ref 3.5–5.3)
POTASSIUM SERPL-SCNC: 3.9 MMOL/L — SIGNIFICANT CHANGE UP (ref 3.5–5.3)
PROT SERPL-MCNC: 6.7 G/DL — SIGNIFICANT CHANGE UP (ref 6–8.3)
PROT UR-MCNC: NEGATIVE MG/DL — SIGNIFICANT CHANGE UP
RBC # BLD: 4.22 M/UL — SIGNIFICANT CHANGE UP (ref 3.8–5.2)
RBC # FLD: 13.8 % — SIGNIFICANT CHANGE UP (ref 10.3–14.5)
RBC CASTS # UR COMP ASSIST: 1 /HPF — SIGNIFICANT CHANGE UP (ref 0–4)
REVIEW: SIGNIFICANT CHANGE UP
SODIUM SERPL-SCNC: 129 MMOL/L — LOW (ref 135–145)
SP GR SPEC: 1.02 — SIGNIFICANT CHANGE UP (ref 1–1.03)
SQUAMOUS # UR AUTO: 1 /HPF — SIGNIFICANT CHANGE UP (ref 0–5)
UROBILINOGEN FLD QL: 1 MG/DL — SIGNIFICANT CHANGE UP (ref 0.2–1)
WBC # BLD: 7.96 K/UL — SIGNIFICANT CHANGE UP (ref 3.8–10.5)
WBC # FLD AUTO: 7.96 K/UL — SIGNIFICANT CHANGE UP (ref 3.8–10.5)
WBC CLUMPS # UR AUTO: PRESENT
WBC UR QL: 190 /HPF — HIGH (ref 0–5)

## 2025-07-11 PROCEDURE — 99233 SBSQ HOSP IP/OBS HIGH 50: CPT

## 2025-07-11 PROCEDURE — 99232 SBSQ HOSP IP/OBS MODERATE 35: CPT

## 2025-07-11 RX ORDER — BISACODYL 5 MG
10 TABLET, DELAYED RELEASE (ENTERIC COATED) ORAL ONCE
Refills: 0 | Status: COMPLETED | OUTPATIENT
Start: 2025-07-11 | End: 2025-07-11

## 2025-07-11 RX ORDER — CEFPODOXIME PROXETIL 200 MG/1
100 TABLET, FILM COATED ORAL EVERY 12 HOURS
Refills: 0 | Status: DISCONTINUED | OUTPATIENT
Start: 2025-07-11 | End: 2025-07-14

## 2025-07-11 RX ADMIN — AMLODIPINE BESYLATE 5 MILLIGRAM(S): 10 TABLET ORAL at 05:16

## 2025-07-11 RX ADMIN — POLYETHYLENE GLYCOL 3350 17 GRAM(S): 17 POWDER, FOR SOLUTION ORAL at 11:43

## 2025-07-11 RX ADMIN — CEFPODOXIME PROXETIL 100 MILLIGRAM(S): 200 TABLET, FILM COATED ORAL at 21:37

## 2025-07-11 RX ADMIN — Medication 2000 UNIT(S): at 11:43

## 2025-07-11 RX ADMIN — ENOXAPARIN SODIUM 40 MILLIGRAM(S): 100 INJECTION SUBCUTANEOUS at 17:12

## 2025-07-11 RX ADMIN — CEFPODOXIME PROXETIL 100 MILLIGRAM(S): 200 TABLET, FILM COATED ORAL at 11:42

## 2025-07-11 RX ADMIN — CARVEDILOL 25 MILLIGRAM(S): 3.12 TABLET, FILM COATED ORAL at 17:13

## 2025-07-11 RX ADMIN — Medication 2 TABLET(S): at 21:36

## 2025-07-11 RX ADMIN — CARVEDILOL 25 MILLIGRAM(S): 3.12 TABLET, FILM COATED ORAL at 05:17

## 2025-07-11 RX ADMIN — Medication 1 APPLICATION(S): at 21:40

## 2025-07-11 RX ADMIN — Medication 88 MICROGRAM(S): at 05:16

## 2025-07-11 RX ADMIN — Medication 75 MILLILITER(S): at 09:31

## 2025-07-11 RX ADMIN — CALCIUM CARBONATE 1 TABLET(S): 750 TABLET ORAL at 11:43

## 2025-07-11 RX ADMIN — ATORVASTATIN CALCIUM 10 MILLIGRAM(S): 80 TABLET, FILM COATED ORAL at 21:37

## 2025-07-11 RX ADMIN — LOSARTAN POTASSIUM 100 MILLIGRAM(S): 100 TABLET, FILM COATED ORAL at 05:16

## 2025-07-11 RX ADMIN — Medication 10 MILLIGRAM(S): at 09:17

## 2025-07-11 NOTE — PROGRESS NOTE ADULT - ASSESSMENT
ASSESSMENT: 84 year old female with PMH of HTN, HLD, hypothyroidism on aspirin presenting to emergency department as transfer from [place] [employer] due to CTH finding of 3 cm acute parenchymal hemorrhage in the left corona radiata and thalamus with intraventricular hemorrhage and bilateral lateral 3rd and 4th ventricles. Patient initially presented to  after being found down with right sided weakness and concern for possible facial droop and altered mental status. At outside hospital patient received DDAVP and Keppra 500mg.    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    NEURO: Neuro exam stable. Continue close monitoring for neurologic deterioration. Keep normotension. LDL 79, continue home statin, no high intensity statin indicated as mechanism is nonatherosclerotic. MRI Brain w/wo, results above. Repeat MRI Brain w/wo in 4-6 weeks to eval for underlying lesion or malformation. Physical therapy/Occupational therapy recommend AR    ANTITHROMBOTIC THERAPY: No AC/AP given hemorrhage    PULMONARY: protecting airway, saturating well     CARDIOVASCULAR: TTE: EF 60%, normal LA. Continue inpatient cardiac monitoring, no events. Cardiology recs appreciated for HTN. Continue coreg 25mg BID, losartan 100mg daily, amlodipine 5mg daily                             SBP goal: < 140    GASTROINTESTINAL:  dysphagia screen initially failed.  S/S eval 7/6 rec FEES, placed on diet, tolerating well       Diet: Regular    RENAL: BUN/Cr stable, good urine output      Na Goal: Greater than 135     Quintero: no    HEMATOLOGY: H/H stable, Platelets normal. BLE dopplers (7/5): no dvt.      DVT ppx:  LMWH started 7/7    ID: afebrile, no leukocytosis     OTHER: R hand xray performed due to fall, no displaced fractures, middle finger possible underlying ligamentous or tendinous injury  Case and plan discussed with patient and and family at bedside, all questions addressed    DISPOSITION: AR per PT/OT eval, medically cleared    CORE MEASURES:        Admission NIHSS: 8     TPA: [] YES [x] NO      LDL/HDL: 79/56     Depression Screen: p     Statin Therapy: yes     Dysphagia Screen: [] PASS [x] FAIL initially     Smoking [] YES [x] NO      Afib [] YES [x] NO     Stroke Education [x] YES [] NO    Obtain screening lower extremity venous ultrasound in patients who meet 1 or more of the following criteria as patient is high risk for DVT/PE on admission:   [] History of DVT/PE  []Hypercoagulable states (Factor V Leiden, Cancer, OCP, etc. )  []Prolonged immobility (hemiplegia/hemiparesis/post operative or any other extended immobilization)  [] Transferred from outside facility (Rehab or Long term care)  [] Age </= to 50   ASSESSMENT: 84 year old female with PMH of HTN, HLD, hypothyroidism on aspirin presenting to emergency department as transfer from [place] due to CTH finding of 3 cm acute parenchymal hemorrhage in the left corona radiata and thalamus with intraventricular hemorrhage and bilateral lateral 3rd and 4th ventricles. Patient initially presented to  after being found down with right sided weakness and concern for possible facial droop and altered mental status. At outside hospital patient received DDAVP and Keppra 500mg.    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    NEURO: Neuro exam stable. Continue close monitoring for neurologic deterioration. Keep normotension. LDL 79, continue home statin, no high intensity statin indicated as mechanism is nonatherosclerotic. MRI Brain w/wo, results above. Repeat MRI Brain w/wo in 4-6 weeks to eval for underlying lesion or malformation. Physical therapy/Occupational therapy recommend AR    ANTITHROMBOTIC THERAPY: No AC/AP given hemorrhage    PULMONARY: protecting airway, saturating well     CARDIOVASCULAR: TTE: EF 60%, normal LA. Continue inpatient cardiac monitoring, no events. Cardiology recs appreciated for HTN. Continue coreg 25mg BID, losartan 100mg daily, amlodipine 5mg daily                             SBP goal: < 140    GASTROINTESTINAL:  dysphagia screen initially failed.  S/S eval 7/6 rec FEES, placed on diet, tolerating well. Constipation previously treated with suppository and had bowel movement on 7/11.        Diet: Regular    RENAL: BUN/Cr stable, good urine output ; hyponatremia- likely dehydration, gentle IVF then reassess.      Na Goal: Greater than 135     Quintero: no    HEMATOLOGY: H/H stable, Platelets normal. BLE dopplers (7/5): no dvt.      DVT ppx:  LMWH started 7/7    ID: afebrile, no leukocytosis ; UTI started on PO abx, UCx pending.     OTHER: R hand xray performed due to fall, no displaced fractures, middle finger possible underlying ligamentous or tendinous injury  Case and plan discussed with patient and and family at bedside, all questions addressed    DISPOSITION: AR per PT/OT eval, medically cleared    CORE MEASURES:        Admission NIHSS: 8     TPA: [] YES [x] NO      LDL/HDL: 79/56     Depression Screen: p     Statin Therapy: yes     Dysphagia Screen: [] PASS [x] FAIL initially     Smoking [] YES [x] NO      Afib [] YES [x] NO     Stroke Education [x] YES [] NO    Obtain screening lower extremity venous ultrasound in patients who meet 1 or more of the following criteria as patient is high risk for DVT/PE on admission:   [] History of DVT/PE  []Hypercoagulable states (Factor V Leiden, Cancer, OCP, etc. )  []Prolonged immobility (hemiplegia/hemiparesis/post operative or any other extended immobilization)  [] Transferred from outside facility (Rehab or Long term care)  [] Age </= to 50

## 2025-07-11 NOTE — PROGRESS NOTE ADULT - SUBJECTIVE AND OBJECTIVE BOX
Patient seen for rehab follow up  CC:  [facility]    no new complaints  daughter at bedside         REVIEW OF SYSTEMS  Constitutional - No fever,  No fatigue  HEENT - No vertigo, No neck pain  Neurological - No headaches, No memory loss  Psychiatric - No depression, No anxiety    FUNCTIONAL PROGRESS  PT 7/10  bed mobility mod assist   transfers mod assist, non mech lift     VITALS  T(C): 37.5 (07-11-25 @ 08:00), Max: 37.5 (07-11-25 @ 08:00)  HR: 78 (07-11-25 @ 08:00) (75 - 81)  BP: 122/67 (07-11-25 @ 08:00) (105/64 - 130/70)  RR: 18 (07-11-25 @ 08:00) (18 - 18)  SpO2: 98% (07-11-25 @ 08:00) (95% - 98%)  Wt(kg): --    MEDICATIONS   acetaminophen     Tablet .. 650 milliGRAM(s) every 6 hours PRN  amLODIPine   Tablet 5 milliGRAM(s) daily  atorvastatin 10 milliGRAM(s) at bedtime  calcium carbonate    500 mG (Tums) Chewable 1 Tablet(s) daily  carvedilol 25 milliGRAM(s) every 12 hours  cefpodoxime 100 milliGRAM(s) every 12 hours  chlorhexidine 4% Liquid 1 Application(s) daily  cholecalciferol 2000 Unit(s) daily  enoxaparin Injectable 40 milliGRAM(s) <User Schedule>  levothyroxine 88 MICROGram(s) daily  losartan 100 milliGRAM(s) daily  polyethylene glycol 3350 17 Gram(s) daily  senna 2 Tablet(s) at bedtime  sodium chloride 0.9%. 1000 milliLiter(s) <Continuous>      RECENT LABS - Reviewed                        12.3   7.96  )-----------( 170      ( 11 Jul 2025 05:40 )             36.8     07-11    129[L]  |  97  |  32[H]  ----------------------------<  102[H]  3.9   |  19[L]  |  0.63    Ca    8.9      11 Jul 2025 05:40    TPro  6.7  /  Alb  3.4  /  TBili  1.1  /  DBili  x   /  AST  13  /  ALT  12  /  AlkPhos  55  07-11      Urinalysis Basic - ( 11 Jul 2025 05:40 )    Color: x / Appearance: x / SG: x / pH: x  Gluc: 102 mg/dL / Ketone: x  / Bili: x / Urobili: x   Blood: x / Protein: x / Nitrite: x   Leuk Esterase: x / RBC: x / WBC x   Sq Epi: x / Non Sq Epi: x / Bacteria: x          < from: CT Head No Cont (07.06.25 @ 09:22) >    FINDINGS:    No significant interval change in appearance of 3.1 x 1.5 cm left   thalamic acute parenchymal hemorrhage with surrounding edema.    Similar mild to moderate intraventricular extension.    Ventricles similar in size, no hydrocephalus. No midline shift or   effacement of basal cisterns.    IMPRESSION:    No significant interval change from CT brain 7/5/2025.    < end of copied text >    < from: MR Head w/wo IV Cont (07.07.25 @ 13:04) >    IMPRESSION:    Redemonstrated left thalamic intraparenchymal hemorrhage with   intraventricular extension, relatively unchanged as compared to prior CT   head 7/6/2025 given differences in technique. No underlying enhancement   is identified.    --- End of Report ---      < end of copied text >      ----------------------------------------------------------------------------------------  PHYSICAL EXAM  Constitutional - NAD, Comfortable, in bed   Chest - Breathing comfortably on room air   Cardiovascular - S1S2   Extremities - No C/C/E, No calf tenderness   Neurologic Exam -   follows commands                 Cognitive -awake, alert     Communication -speech fluent responds to questions      Motor- RUE 3/5, RLE 4/5      Psychiatric - Mood stable, Affect WNL  ----------------------------------------------------------------------------------------  ASSESSMENT/PLAN  84yFemale h/o HTN< hypothyroid with functional deficits 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  CT stable as above, MRI with left thalamic IPH  s/p FEES, regular diet   no BM, given suppository   Pain - Tylenol  DVT PPX - SCDs lovenox   Rehab -    patient requires mod assist with mobility, +cognitive deficits   continue bedside therapy while admitted to prevent secondary complications of immobility, bed mobility, transfer training, progressive ambulation, equipment evaluation, ADLs   OOB throughout the day with staff, OOB to chair with meals/3 hours daily          Recommend ACUTE inpatient rehabilitation for the functional deficits consisting of 3 hours of multidisciplinary intense therapy/day x 5 days/week x 2-4 weeks depending on progress at rehabilitation facility, 24 hour RN/daily PMR physician for comorbid medical management.      Patient will be able to participate in and benefit from intense rehabilitation therapies for 3 hours a day x 5 days/week to maximize independence.     Rehab recommendations are dependent on functional progress and participation with bedside therapy     daughter reports they are planning to provide assist in the home on discharge from rehab     Will continue to follow for ongoing rehab needs and recommendations.                 35 minutes spent on total encounter  with chart review of PT notes, exam, imaging, counseling and education on inpatient rehabilitation, coordination of care with rehab team and

## 2025-07-11 NOTE — PROGRESS NOTE ADULT - SUBJECTIVE AND OBJECTIVE BOX
THE PATIENT WAS SEEN AND EXAMINED BY ME WITH THE HOUSESTAFF AND STROKE TEAM DURING MORNING ROUNDS.   HPI: 83 yo female with PMH of HTN, HLD, hypothyroidism on aspirin presented to emergency department as transfer from [place] due to CTH finding of 3 cm acute parenchymal hemorrhage in the left corona radiata and thalamus with intraventricular hemorrhage and bilateral lateral 3rd and 4th ventricles. As per family patient was found down in shower this morning, had right sided weakness and concern for possible facial droop, altered mental status.      SUBJECTIVE: No events overnight.  No new neurologic complaints.  ROS reported negative unless otherwise noted.    acetaminophen     Tablet .. 650 milliGRAM(s) Oral every 6 hours PRN  amLODIPine   Tablet 5 milliGRAM(s) Oral daily  atorvastatin 10 milliGRAM(s) Oral at bedtime  calcium carbonate    500 mG (Tums) Chewable 1 Tablet(s) Chew daily  carvedilol 25 milliGRAM(s) Oral every 12 hours  chlorhexidine 4% Liquid 1 Application(s) Topical daily  cholecalciferol 2000 Unit(s) Oral daily  enoxaparin Injectable 40 milliGRAM(s) SubCutaneous <User Schedule>  levothyroxine 88 MICROGram(s) Oral daily  losartan 100 milliGRAM(s) Oral daily  polyethylene glycol 3350 17 Gram(s) Oral daily  senna 2 Tablet(s) Oral at bedtime      PHYSICAL EXAM:   Vital Signs Last 24 Hrs  T(C): 36.8 (11 Jul 2025 04:45), Max: 36.9 (11 Jul 2025 00:48)  T(F): 98.3 (11 Jul 2025 04:45), Max: 98.5 (11 Jul 2025 00:48)  HR: 80 (11 Jul 2025 04:45) (69 - 81)  BP: 118/71 (11 Jul 2025 04:45) (105/64 - 130/70)  RR: 18 (11 Jul 2025 04:45) (18 - 18)  SpO2: 96% (11 Jul 2025 04:45) (95% - 97%)    Parameters below as of 11 Jul 2025 04:45  Patient On (Oxygen Delivery Method): room air      Wol[place] Translation provided by patient's daughter  General: No acute distress  HEENT: EOM intact, visual fields full  Abdomen: Soft, nontender, nondistended   Extremities: No edema    NEUROLOGICAL EXAM:  Mental status: Eyes open to voice. Oriented to self and to hospital (with choices). Incorrect month (baseline). Speech fluent. Follows simple commands  Cranial Nerves: No facial asymmetry, No dysarthria  Motor exam: RUE 3-4/5 with drift, RLE subtle drift. LUE/LLE antigravity without drifts  Sensation: Intact to light touch   Coordination/ Gait: No dysmetria, LUH intact and symmetric bilaterally    LABS:                        12.3   7.96  )-----------( 170      ( 11 Jul 2025 05:40 )             36.8    07-11    129[L]  |  97  |  32[H]  ----------------------------<  102[H]  3.9   |  19[L]  |  0.63    Ca    8.9      11 Jul 2025 05:40    TPro  6.7  /  Alb  3.4  /  TBili  1.1  /  DBili  x   /  AST  13  /  ALT  12  /  AlkPhos  55  07-11        IMAGING: Reviewed by me.     MRI Brain w/wo (7/7/25): Redemonstrated left thalamic intraparenchymal hemorrhage with intraventricular extension, relatively unchanged as compared to prior CT head 7/6/2025 given differences in technique. No underlying enhancement is identified.    CTH 7/6/25: No significant interval change from CT brain 7/5/2025.    CTH 75/25 @ 21:29: Grossly unchanged left thalamic and corona radiata hemorrhage with intraventricular extension.      7/5/25 @ 13:53:  CT HEAD:  -No new or increasing intracranial hemorrhage since this morning's scan at outside hospital (separate record number, accession #[id number]).  -Left thalamic and corona radiata hemorrhage with intraventricular extension. Stable ventricular size but close follow-up advised.    CTA BRAIN: No arterial occlusion or significant stenosis. No aneurysm or other arterial source for the left parenchymal hemorrhage.  CTA NECK: No arterial steno-occlusive disease or evidence of dissection.

## 2025-07-11 NOTE — PROGRESS NOTE ADULT - SUBJECTIVE AND OBJECTIVE BOX
DATE OF SERVICE: 07-11-25 @ 08:15    Patient is a 84y old  Female who presents with a chief complaint of IPH w/ IVH (11 Jul 2025 07:52)      INTERVAL HISTORY: in no acute distress    REVIEW OF SYSTEMS:  CONSTITUTIONAL: No weakness  EYES/ENT: No visual changes;  No throat pain   NECK: No pain or stiffness  RESPIRATORY: No cough, wheezing; No shortness of breath  CARDIOVASCULAR: No chest pain or palpitations  GASTROINTESTINAL: No abdominal  pain. No nausea, vomiting, or hematemesis  GENITOURINARY: No dysuria, frequency or hematuria  NEUROLOGICAL: No stroke like symptoms  SKIN: No rashes    	  MEDICATIONS:  amLODIPine   Tablet 5 milliGRAM(s) Oral daily  carvedilol 25 milliGRAM(s) Oral every 12 hours  losartan 100 milliGRAM(s) Oral daily        PHYSICAL EXAM:  T(C): 36.8 (07-11-25 @ 04:45), Max: 36.9 (07-11-25 @ 00:48)  HR: 80 (07-11-25 @ 04:45) (69 - 81)  BP: 118/71 (07-11-25 @ 04:45) (105/64 - 130/70)  RR: 18 (07-11-25 @ 04:45) (18 - 18)  SpO2: 96% (07-11-25 @ 04:45) (95% - 97%)  Wt(kg): --  I&O's Summary    10 Jul 2025 07:01  -  11 Jul 2025 07:00  --------------------------------------------------------  IN: 340 mL / OUT: 525 mL / NET: -185 mL          Appearance: In no distress	  HEENT:    PERRL, EOMI	  Cardiovascular:  S1 S2, No JVD  Respiratory: Lungs clear to auscultation	  Gastrointestinal:  Soft, Non-tender, + BS	  Vascularature:  No edema of LE  Psychiatric: Appropriate affect   Neuro: no acute focal deficits                               12.3   7.96  )-----------( 170      ( 11 Jul 2025 05:40 )             36.8     07-11    129[L]  |  97  |  32[H]  ----------------------------<  102[H]  3.9   |  19[L]  |  0.63    Ca    8.9      11 Jul 2025 05:40    TPro  6.7  /  Alb  3.4  /  TBili  1.1  /  DBili  x   /  AST  13  /  ALT  12  /  AlkPhos  55  07-11        Labs personally reviewed      ASSESSMENT/PLAN: 	    84F on ASA81, PMHx HTN, HLD pt found down, no external signs of trauma @ CTH w/Lt BG IPH, IVH , DDAVP + keppra given at .    Problem 1: ICH  - CT head 7/5 with Left thalamic and corona radiata hemorrhage with intraventricular extension  - Interval CTH stable hemorrhage, CTA head and neck negative  - Appreciate NSICU and Neurosurgery recs  - Seizure ppx: Keppra 500mg BID x 7 days     Problem 2: LE edema  - TTE (OP) 5/2025 - EF 65-70%, mild grade 1 DD, LA mod dilated, lipomatous intratrial septum, ascending aorta 3.8cm, mod-severe TR, mod MR, calcified mitral valve with prolapse of posterior mitral leaflet, mild-mod AR and NM  -- findings stable compared to TTE 2/2024  - Duplex US LE 5/2025 - no evidence of DVT  - Duplex US LE 7/5/25 - no evidence of DVT  - TTE 7/7 EF 60%, no rmwa. Compared to prior echo, the posterior leaflet of the mitral valve is not proalpsing on this study (though it was on the prior). There is trace mitral regurgitation. There is decreased tricuspid regurgitation.    Problem 3: HTN  - At home on candesartan 32 mg, metoprolol 50mg BID & Lasix 20mg   - In hospital, c/w Coreg 12.5mg BID, Losartan 100mg QD, amlodipine 5mg DQ    Problem 4: HLD  - On simvastatin 10mg QD at home  - In hospital, c/w Lipitor 10mg     Follows OP cardio Dr. Kartik Whaley, MYRON Ibarra, DO [facility]  Cardiovascular Medicine  [address], [address]  Available through call or text on [employer] TEAM[employer]  Office: [phone]

## 2025-07-12 LAB
ALBUMIN SERPL ELPH-MCNC: 3.1 G/DL — LOW (ref 3.3–5)
ALP SERPL-CCNC: 55 U/L — SIGNIFICANT CHANGE UP (ref 40–120)
ALT FLD-CCNC: 15 U/L — SIGNIFICANT CHANGE UP (ref 10–45)
ANION GAP SERPL CALC-SCNC: 13 MMOL/L — SIGNIFICANT CHANGE UP (ref 5–17)
AST SERPL-CCNC: 20 U/L — SIGNIFICANT CHANGE UP (ref 10–40)
BILIRUB SERPL-MCNC: 0.8 MG/DL — SIGNIFICANT CHANGE UP (ref 0.2–1.2)
BUN SERPL-MCNC: 22 MG/DL — SIGNIFICANT CHANGE UP (ref 7–23)
CALCIUM SERPL-MCNC: 8.4 MG/DL — SIGNIFICANT CHANGE UP (ref 8.4–10.5)
CHLORIDE SERPL-SCNC: 100 MMOL/L — SIGNIFICANT CHANGE UP (ref 96–108)
CO2 SERPL-SCNC: 18 MMOL/L — LOW (ref 22–31)
CREAT SERPL-MCNC: 0.56 MG/DL — SIGNIFICANT CHANGE UP (ref 0.5–1.3)
EGFR: 91 ML/MIN/1.73M2 — SIGNIFICANT CHANGE UP
EGFR: 91 ML/MIN/1.73M2 — SIGNIFICANT CHANGE UP
GLUCOSE SERPL-MCNC: 96 MG/DL — SIGNIFICANT CHANGE UP (ref 70–99)
HCT VFR BLD CALC: 37.2 % — SIGNIFICANT CHANGE UP (ref 34.5–45)
HGB BLD-MCNC: 12.3 G/DL — SIGNIFICANT CHANGE UP (ref 11.5–15.5)
MCHC RBC-ENTMCNC: 29.4 PG — SIGNIFICANT CHANGE UP (ref 27–34)
MCHC RBC-ENTMCNC: 33.1 G/DL — SIGNIFICANT CHANGE UP (ref 32–36)
MCV RBC AUTO: 89 FL — SIGNIFICANT CHANGE UP (ref 80–100)
NRBC # BLD AUTO: 0 K/UL — SIGNIFICANT CHANGE UP (ref 0–0)
NRBC # FLD: 0 K/UL — SIGNIFICANT CHANGE UP (ref 0–0)
NRBC BLD AUTO-RTO: 0 /100 WBCS — SIGNIFICANT CHANGE UP (ref 0–0)
PLATELET # BLD AUTO: 179 K/UL — SIGNIFICANT CHANGE UP (ref 150–400)
PMV BLD: 10.2 FL — SIGNIFICANT CHANGE UP (ref 7–13)
POTASSIUM SERPL-MCNC: 3.8 MMOL/L — SIGNIFICANT CHANGE UP (ref 3.5–5.3)
POTASSIUM SERPL-SCNC: 3.8 MMOL/L — SIGNIFICANT CHANGE UP (ref 3.5–5.3)
PROT SERPL-MCNC: 6.3 G/DL — SIGNIFICANT CHANGE UP (ref 6–8.3)
RBC # BLD: 4.18 M/UL — SIGNIFICANT CHANGE UP (ref 3.8–5.2)
RBC # FLD: 13.4 % — SIGNIFICANT CHANGE UP (ref 10.3–14.5)
SODIUM SERPL-SCNC: 131 MMOL/L — LOW (ref 135–145)
WBC # BLD: 7.13 K/UL — SIGNIFICANT CHANGE UP (ref 3.8–10.5)
WBC # FLD AUTO: 7.13 K/UL — SIGNIFICANT CHANGE UP (ref 3.8–10.5)

## 2025-07-12 PROCEDURE — 99233 SBSQ HOSP IP/OBS HIGH 50: CPT

## 2025-07-12 RX ORDER — TAMSULOSIN HYDROCHLORIDE 0.4 MG/1
0.4 CAPSULE ORAL AT BEDTIME
Refills: 0 | Status: DISCONTINUED | OUTPATIENT
Start: 2025-07-12 | End: 2025-07-14

## 2025-07-12 RX ORDER — LANOLIN/MINERAL OIL/PETROLATUM
1 OINTMENT (GRAM) OPHTHALMIC (EYE)
Refills: 0 | Status: DISCONTINUED | OUTPATIENT
Start: 2025-07-12 | End: 2025-07-14

## 2025-07-12 RX ADMIN — Medication 1 APPLICATION(S): at 23:08

## 2025-07-12 RX ADMIN — CEFPODOXIME PROXETIL 100 MILLIGRAM(S): 200 TABLET, FILM COATED ORAL at 16:48

## 2025-07-12 RX ADMIN — TAMSULOSIN HYDROCHLORIDE 0.4 MILLIGRAM(S): 0.4 CAPSULE ORAL at 23:08

## 2025-07-12 RX ADMIN — ATORVASTATIN CALCIUM 10 MILLIGRAM(S): 80 TABLET, FILM COATED ORAL at 23:08

## 2025-07-12 RX ADMIN — Medication 2000 UNIT(S): at 11:26

## 2025-07-12 RX ADMIN — CALCIUM CARBONATE 1 TABLET(S): 750 TABLET ORAL at 11:26

## 2025-07-12 RX ADMIN — CARVEDILOL 25 MILLIGRAM(S): 3.12 TABLET, FILM COATED ORAL at 16:48

## 2025-07-12 RX ADMIN — CEFPODOXIME PROXETIL 100 MILLIGRAM(S): 200 TABLET, FILM COATED ORAL at 05:19

## 2025-07-12 RX ADMIN — LOSARTAN POTASSIUM 100 MILLIGRAM(S): 100 TABLET, FILM COATED ORAL at 06:40

## 2025-07-12 RX ADMIN — ENOXAPARIN SODIUM 40 MILLIGRAM(S): 100 INJECTION SUBCUTANEOUS at 16:48

## 2025-07-12 RX ADMIN — CARVEDILOL 25 MILLIGRAM(S): 3.12 TABLET, FILM COATED ORAL at 05:18

## 2025-07-12 RX ADMIN — AMLODIPINE BESYLATE 5 MILLIGRAM(S): 10 TABLET ORAL at 05:18

## 2025-07-12 RX ADMIN — Medication 88 MICROGRAM(S): at 05:18

## 2025-07-12 RX ADMIN — POLYETHYLENE GLYCOL 3350 17 GRAM(S): 17 POWDER, FOR SOLUTION ORAL at 11:25

## 2025-07-12 RX ADMIN — Medication 2 TABLET(S): at 23:08

## 2025-07-12 NOTE — PROGRESS NOTE ADULT - SUBJECTIVE AND OBJECTIVE BOX
DATE OF SERVICE: 07-12-25 @ 12:51    Patient is a 84y old  Female who presents with a chief complaint of IPH w/ IVH (12 Jul 2025 11:14)      INTERVAL HISTORY: in no acute distress, OOB in chair. family at bedside    REVIEW OF SYSTEMS:  CONSTITUTIONAL: No weakness  EYES/ENT: No visual changes;  No throat pain   NECK: No pain or stiffness  RESPIRATORY: No cough, wheezing; No shortness of breath  CARDIOVASCULAR: No chest pain or palpitations  GASTROINTESTINAL: No abdominal  pain. No nausea, vomiting, or hematemesis  GENITOURINARY: No dysuria, frequency or hematuria  NEUROLOGICAL: No stroke like symptoms  SKIN: No rashes      	  MEDICATIONS:  amLODIPine   Tablet 5 milliGRAM(s) Oral daily  carvedilol 25 milliGRAM(s) Oral every 12 hours  losartan 100 milliGRAM(s) Oral daily        PHYSICAL EXAM:  T(C): 36.7 (07-12-25 @ 05:05), Max: 36.9 (07-11-25 @ 20:18)  HR: 74 (07-12-25 @ 05:05) (73 - 88)  BP: 147/75 (07-12-25 @ 05:05) (102/63 - 147/75)  RR: 19 (07-12-25 @ 05:05) (18 - 19)  SpO2: 96% (07-12-25 @ 05:05) (96% - 98%)  Wt(kg): --  I&O's Summary    11 Jul 2025 07:01  -  12 Jul 2025 07:00  --------------------------------------------------------  IN: 990 mL / OUT: 850 mL / NET: 140 mL          Appearance: In no distress	  HEENT:    PERRL, EOMI	  Cardiovascular:  S1 S2, No JVD  Respiratory: Lungs clear to auscultation	  Gastrointestinal:  Soft, Non-tender, + BS	  Vascularature:  No edema of LE  Psychiatric: Appropriate affect   Neuro: no acute focal deficits                               12.3   7.13  )-----------( 179      ( 12 Jul 2025 05:31 )             37.2     07-12    131[L]  |  100  |  22  ----------------------------<  96  3.8   |  18[L]  |  0.56    Ca    8.4      12 Jul 2025 05:30    TPro  6.3  /  Alb  3.1[L]  /  TBili  0.8  /  DBili  x   /  AST  20  /  ALT  15  /  AlkPhos  55  07-12        Labs personally reviewed      ASSESSMENT/PLAN: 	    84F on ASA81, PMHx HTN, HLD pt found down, no external signs of trauma @ CTH w/Lt BG IPH, IVH , DDAVP + keppra given at .    Problem 1: ICH  - CT head 7/5 with Left thalamic and corona radiata hemorrhage with intraventricular extension  - Interval CTH stable hemorrhage, CTA head and neck negative  - Appreciate NSICU and Neurosurgery recs  - Seizure ppx: Keppra 500mg BID x 7 days     Problem 2: LE edema  - TTE (OP) 5/2025 - EF 65-70%, mild grade 1 DD, LA mod dilated, lipomatous intratrial septum, ascending aorta 3.8cm, mod-severe TR, mod MR, calcified mitral valve with prolapse of posterior mitral leaflet, mild-mod AR and SC  -- findings stable compared to TTE 2/2024  - Duplex US LE 5/2025 - no evidence of DVT  - Duplex US LE 7/5/25 - no evidence of DVT  - TTE 7/7 EF 60%, no rmwa. Compared to prior echo, the posterior leaflet of the mitral valve is not proalpsing on this study (though it was on the prior). There is trace mitral regurgitation. There is decreased tricuspid regurgitation.    Problem 3: HTN  - At home on candesartan 32 mg, metoprolol 50mg BID & Lasix 20mg   - In hospital, c/w Coreg 12.5mg BID, Losartan 100mg QD, amlodipine 5mg DQ    Problem 4: HLD  - On simvastatin 10mg QD at home  - In hospital, c/w Lipitor 10mg     Follows OP cardio Dr. Jeffrey Spivak Iolani Behrbom, AG-NP   Fahad Ibarra,  [facility]  Cardiovascular Medicine  [address] [facility], [address]  Available through call or text on [employer] TEAM[employer]  Office: [phone]

## 2025-07-12 NOTE — PROGRESS NOTE ADULT - ASSESSMENT
84 year old female with PMH of HTN, HLD, hypothyroidism on aspirin presenting to emergency department as transfer from [place] due to CTH finding of 3 cm acute parenchymal hemorrhage in the left corona radiata and thalamus with intraventricular hemorrhage and bilateral lateral 3rd and 4th ventricles. Patient initially presented to  after being found down with right sided weakness and concern for possible facial droop and altered mental status. At outside hospital patient received DDAVP and Keppra 500mg.    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    NEURO: Neuro exam stable. Continue close monitoring for neurologic deterioration. Keep normotension. LDL 79, continue home statin, no high intensity statin indicated as mechanism is nonatherosclerotic. MRI Brain w/wo, results above. Repeat MRI Brain w/wo in 4-6 weeks to eval for underlying lesion or malformation. Physical therapy/Occupational therapy recommend AR    ANTITHROMBOTIC THERAPY: No AC/AP given hemorrhage    PULMONARY: protecting airway, saturating well     CARDIOVASCULAR: TTE: EF 60%, normal LA. Continue inpatient cardiac monitoring, no events. Cardiology recs appreciated for HTN. Continue coreg 25mg BID, losartan 100mg daily, amlodipine 5mg daily                             SBP goal: < 140    GASTROINTESTINAL:  dysphagia screen initially failed.  S/S eval 7/6 rec FEES, placed on diet, tolerating well. Constipation previously treated with suppository and had bowel movement on 7/11.        Diet: Regular    RENAL: BUN/Cr stable, good urine output ; hyponatremia- likely dehydration, gentle IVF then reassess.      Na Goal: Greater than 135     Quitnero: no    HEMATOLOGY: H/H stable, Platelets normal. BLE dopplers (7/5): no dvt.      DVT ppx:  LMWH started 7/7    ID: afebrile, no leukocytosis ; UTI started on PO abx, UCx pending.     OTHER: R hand xray performed due to fall, no displaced fractures, middle finger possible underlying ligamentous or tendinous injury  Case and plan discussed with patient and and family at bedside, all questions addressed    DISPOSITION: AR per PT/OT eval, medically cleared    CORE MEASURES:        Admission NIHSS: 8     TPA: [] YES [x] NO      LDL/HDL: 79/56     Depression Screen: p     Statin Therapy: yes     Dysphagia Screen: [] PASS [x] FAIL initially     Smoking [] YES [x] NO      Afib [] YES [x] NO     Stroke Education [x] YES [] NO    Obtain screening lower extremity venous ultrasound in patients who meet 1 or more of the following criteria as patient is high risk for DVT/PE on admission:   [] History of DVT/PE  []Hypercoagulable states (Factor V Leiden, Cancer, OCP, etc. )  []Prolonged immobility (hemiplegia/hemiparesis/post operative or any other extended immobilization)  [] Transferred from outside facility (Rehab or Long term care)  [] Age </= to 50     83yo female with PMH of HTN, HLD, hypothyroidism on aspirin presenting to emergency department as transfer from [place] due to CTH finding of 3 cm acute parenchymal hemorrhage in the left corona radiata and thalamus with intraventricular hemorrhage and bilateral lateral 3rd and 4th ventricles. Patient initially presented to  after being found down with right sided weakness and concern for possible facial droop and altered mental status. At outside hospital patient received DDAVP and Keppra 500mg.    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 Mechanism likely hypertensive.    NEURO: Neuro exam stable. Continue close monitoring for neurologic deterioration. Keep normotension. LDL 79, continue home statin, no high intensity statin indicated as mechanism is nonatherosclerotic. MRI Brain w/wo, results above. Repeat MRI Brain w/wo in 4-6 weeks to eval for underlying lesion or malformation. Physical therapy/Occupational therapy recommend AR    ANTITHROMBOTIC THERAPY: No AC/AP given hemorrhage    PULMONARY: protecting airway, saturating well     CARDIOVASCULAR: TTE: EF 60%, normal LA. Continue inpatient cardiac monitoring, no events. Cardiology recs appreciated for HTN. Continue Coreg 25mg BID, losartan 100mg daily, amlodipine 5mg daily                             SBP goal: < 140    GASTROINTESTINAL:  Dysphagia screen initially failed.  S/S eval 7/6 rec FEES, placed on diet, tolerating well. Constipation previously treated with suppository and had bowel movement on 7/11.        Diet: Regular    RENAL: BUN/Cr stable, good urine output ; hyponatremia - likely dehydration, improving with IVF      Na Goal: Greater than 135     Quintero: no    HEMATOLOGY: H/H stable, Platelets normal. BLE dopplers (7/5): no DVT     DVT ppx:  LMWH started 7/7    ID: afebrile, no leukocytosis ; UTI started on PO abx, UCx pending.     OTHER: R hand xray performed due to fall, no displaced fractures, middle finger possible underlying ligamentous or tendinous injury  Case and plan discussed with patient and and family at bedside, all questions addressed    DISPOSITION: AR per PT/OT eval, medically cleared for discharge    CORE MEASURES:        Admission NIHSS: 8     TPA: [] YES [x] NO      LDL/HDL: 79/56     Depression Screen: p     Statin Therapy: yes     Dysphagia Screen: [] PASS [x] FAIL initially     Smoking [] YES [x] NO      Afib [] YES [x] NO     Stroke Education [x] YES [] NO    Obtain screening lower extremity venous ultrasound in patients who meet 1 or more of the following criteria as patient is high risk for DVT/PE on admission:   [] History of DVT/PE  [] Hypercoagulable states (Factor V Leiden, Cancer, OCP, etc. )  [] Prolonged immobility (hemiplegia/hemiparesis/post operative or any other extended immobilization)  [] Transferred from outside facility (Rehab or Long term care)  [] Age </= to 50

## 2025-07-12 NOTE — PROGRESS NOTE ADULT - SUBJECTIVE AND OBJECTIVE BOX
THE PATIENT WAS SEEN AND EXAMINED BY ME WITH THE HOUSESTAFF AND STROKE TEAM DURING MORNING ROUNDS.     HPI: 83 yo female with PMH of HTN, HLD, hypothyroidism on aspirin presented to emergency department as transfer from [place] due to CTH finding of 3 cm acute parenchymal hemorrhage in the left corona radiata and thalamus with intraventricular hemorrhage and bilateral lateral 3rd and 4th ventricles. As per family patient was found down in shower this morning, had right sided weakness and concern for possible facial droop, altered mental status.      SUBJECTIVE: No events overnight. No new neurologic complaints.     acetaminophen     Tablet .. 650 milliGRAM(s) Oral every 6 hours PRN  amLODIPine   Tablet 5 milliGRAM(s) Oral daily  atorvastatin 10 milliGRAM(s) Oral at bedtime  calcium carbonate    500 mG (Tums) Chewable 1 Tablet(s) Chew daily  carvedilol 25 milliGRAM(s) Oral every 12 hours  cefpodoxime 100 milliGRAM(s) Oral every 12 hours  chlorhexidine 4% Liquid 1 Application(s) Topical daily  cholecalciferol 2000 Unit(s) Oral daily  enoxaparin Injectable 40 milliGRAM(s) SubCutaneous <User Schedule>  levothyroxine 88 MICROGram(s) Oral daily  losartan 100 milliGRAM(s) Oral daily  polyethylene glycol 3350 17 Gram(s) Oral daily  senna 2 Tablet(s) Oral at bedtime  sodium chloride 0.9%. 1000 milliLiter(s) IV Continuous <Continuous>    PHYSICAL EXAM:   Vital Signs Last 24 Hrs  T(C): 36.7 (12 Jul 2025 05:05), Max: 37 (11 Jul 2025 12:00)  T(F): 98.1 (12 Jul 2025 05:05), Max: 98.6 (11 Jul 2025 12:00)  HR: 74 (12 Jul 2025 05:05) (72 - 88)  BP: 147/75 (12 Jul 2025 05:05) (102/63 - 147/75)  BP(mean): --  RR: 19 (12 Jul 2025 05:05) (18 - 19)  SpO2: 96% (12 Jul 2025 05:05) (96% - 98%)    Parameters below as of 12 Jul 2025 05:05  Patient On (Oxygen Delivery Method): room air    [place]ian Translation provided by patient's daughter  General: No acute distress  HEENT: EOM intact, visual fields full  Abdomen: Soft, nontender, nondistended   Extremities: No edema    NEUROLOGICAL EXAM:  Mental status: Eyes open to voice. Oriented to self and to hospital (with choices). Incorrect month (baseline). Speech fluent. Follows simple commands  Cranial Nerves: No facial asymmetry, No dysarthria  Motor exam: RUE 3-4/5 with drift, RLE subtle drift. LUE/LLE antigravity without drifts  Sensation: Intact to light touch   Coordination/ Gait: No dysmetria, LUH intact and symmetric bilaterally    LABS:                        12.3   7.13  )-----------( 179      ( 12 Jul 2025 05:31 )             37.2    07-12    131[L]  |  100  |  22  ----------------------------<  96  3.8   |  18[L]  |  0.56    Ca    8.4      12 Jul 2025 05:30    TPro  6.3  /  Alb  3.1[L]  /  TBili  0.8  /  DBili  x   /  AST  20  /  ALT  15  /  AlkPhos  55  07-12    IMAGING: Reviewed by me.     MRI Brain w/wo (7/7/25): Redemonstrated left thalamic intraparenchymal hemorrhage with intraventricular extension, relatively unchanged as compared to prior CT head 7/6/2025 given differences in technique. No underlying enhancement is identified.    CTH 7/6/25: No significant interval change from CT brain 7/5/2025.    CT 75/25 @ 21:29: Grossly unchanged left thalamic and corona radiata hemorrhage with intraventricular extension.    7/5/25 @ 13:53:  CT HEAD:  -No new or increasing intracranial hemorrhage since this morning's scan at outside hospital (separate record number, accession #[id number]).  -Left thalamic and corona radiata hemorrhage with intraventricular extension. Stable ventricular size but close follow-up advised.    CTA BRAIN: No arterial occlusion or significant stenosis. No aneurysm or other arterial source for the left parenchymal hemorrhage.  CTA NECK: No arterial steno-occlusive disease or evidence of dissection. THE PATIENT WAS SEEN AND EXAMINED BY ME WITH THE HOUSESTAFF AND STROKE TEAM DURING MORNING ROUNDS.     HPI: 85 yo female with PMH of HTN, HLD, hypothyroidism on aspirin presented to emergency department as transfer from [place] due to CTH finding of 3 cm acute parenchymal hemorrhage in the left corona radiata and thalamus with intraventricular hemorrhage and bilateral lateral 3rd and 4th ventricles. As per family patient was found down in shower this morning, had right sided weakness and concern for possible facial droop, altered mental status.      SUBJECTIVE: No events overnight. No new neurologic complaints.    acetaminophen     Tablet .. 650 milliGRAM(s) Oral every 6 hours PRN  amLODIPine   Tablet 5 milliGRAM(s) Oral daily  atorvastatin 10 milliGRAM(s) Oral at bedtime  calcium carbonate    500 mG (Tums) Chewable 1 Tablet(s) Chew daily  carvedilol 25 milliGRAM(s) Oral every 12 hours  cefpodoxime 100 milliGRAM(s) Oral every 12 hours  chlorhexidine 4% Liquid 1 Application(s) Topical daily  cholecalciferol 2000 Unit(s) Oral daily  enoxaparin Injectable 40 milliGRAM(s) SubCutaneous <User Schedule>  levothyroxine 88 MICROGram(s) Oral daily  losartan 100 milliGRAM(s) Oral daily  polyethylene glycol 3350 17 Gram(s) Oral daily  senna 2 Tablet(s) Oral at bedtime  sodium chloride 0.9%. 1000 milliLiter(s) IV Continuous <Continuous>    PHYSICAL EXAM:   Vital Signs Last 24 Hrs  T(C): 36.7 (12 Jul 2025 05:05), Max: 37 (11 Jul 2025 12:00)  T(F): 98.1 (12 Jul 2025 05:05), Max: 98.6 (11 Jul 2025 12:00)  HR: 74 (12 Jul 2025 05:05) (72 - 88)  BP: 147/75 (12 Jul 2025 05:05) (102/63 - 147/75)  BP(mean): --  RR: 19 (12 Jul 2025 05:05) (18 - 19)  SpO2: 96% (12 Jul 2025 05:05) (96% - 98%)    Parameters below as of 12 Jul 2025 05:05  Patient On (Oxygen Delivery Method): room air    English Translation provided by patient's daughter  General: No acute distress  HEENT: EOM intact, visual fields full  Abdomen: Soft, nontender, nondistended   Extremities: No edema    NEUROLOGICAL EXAM:  Mental status: Eyes open to voice. Oriented to self and to hospital (with choices). Incorrect month (baseline). Speech fluent. Follows simple commands.  Cranial Nerves: No facial asymmetry, No dysarthria  Motor exam: RUE 3-4/5 with drift, RLE subtle drift. LUE/LLE antigravity without drifts  Sensation: Intact to light touch   Coordination/ Gait: No dysmetria, LUH intact and symmetric bilaterally    LABS:                        12.3   7.13  )-----------( 179      ( 12 Jul 2025 05:31 )             37.2    07-12    131[L]  |  100  |  22  ----------------------------<  96  3.8   |  18[L]  |  0.56    Ca    8.4      12 Jul 2025 05:30    TPro  6.3  /  Alb  3.1[L]  /  TBili  0.8  /  DBili  x   /  AST  20  /  ALT  15  /  AlkPhos  55  07-12    IMAGING: Reviewed by me.     MRI Brain w/wo (7/7/25): Redemonstrated left thalamic intraparenchymal hemorrhage with intraventricular extension, relatively unchanged as compared to prior CT head 7/6/2025 given differences in technique. No underlying enhancement is identified.    CTH 7/6/25: No significant interval change from CT brain 7/5/2025.    CTH 75/25 @ 21:29: Grossly unchanged left thalamic and corona radiata hemorrhage with intraventricular extension.    7/5/25 @ 13:53:  CT HEAD:  -No new or increasing intracranial hemorrhage since this morning's scan at outside hospital (separate record number, accession #[id number]).  -Left thalamic and corona radiata hemorrhage with intraventricular extension. Stable ventricular size but close follow-up advised.    CTA BRAIN: No arterial occlusion or significant stenosis. No aneurysm or other arterial source for the left parenchymal hemorrhage.  CTA NECK: No arterial steno-occlusive disease or evidence of dissection.

## 2025-07-13 LAB
ALBUMIN SERPL ELPH-MCNC: 3.1 G/DL — LOW (ref 3.3–5)
ALP SERPL-CCNC: 49 U/L — SIGNIFICANT CHANGE UP (ref 40–120)
ALT FLD-CCNC: 17 U/L — SIGNIFICANT CHANGE UP (ref 10–45)
ANION GAP SERPL CALC-SCNC: 12 MMOL/L — SIGNIFICANT CHANGE UP (ref 5–17)
AST SERPL-CCNC: 17 U/L — SIGNIFICANT CHANGE UP (ref 10–40)
BILIRUB SERPL-MCNC: 0.6 MG/DL — SIGNIFICANT CHANGE UP (ref 0.2–1.2)
BUN SERPL-MCNC: 20 MG/DL — SIGNIFICANT CHANGE UP (ref 7–23)
CALCIUM SERPL-MCNC: 8.4 MG/DL — SIGNIFICANT CHANGE UP (ref 8.4–10.5)
CHLORIDE SERPL-SCNC: 103 MMOL/L — SIGNIFICANT CHANGE UP (ref 96–108)
CO2 SERPL-SCNC: 18 MMOL/L — LOW (ref 22–31)
CREAT SERPL-MCNC: 0.56 MG/DL — SIGNIFICANT CHANGE UP (ref 0.5–1.3)
EGFR: 91 ML/MIN/1.73M2 — SIGNIFICANT CHANGE UP
EGFR: 91 ML/MIN/1.73M2 — SIGNIFICANT CHANGE UP
GLUCOSE SERPL-MCNC: 91 MG/DL — SIGNIFICANT CHANGE UP (ref 70–99)
HCT VFR BLD CALC: 32.7 % — LOW (ref 34.5–45)
HGB BLD-MCNC: 10.7 G/DL — LOW (ref 11.5–15.5)
MCHC RBC-ENTMCNC: 29.2 PG — SIGNIFICANT CHANGE UP (ref 27–34)
MCHC RBC-ENTMCNC: 32.7 G/DL — SIGNIFICANT CHANGE UP (ref 32–36)
MCV RBC AUTO: 89.1 FL — SIGNIFICANT CHANGE UP (ref 80–100)
NRBC # BLD AUTO: 0 K/UL — SIGNIFICANT CHANGE UP (ref 0–0)
NRBC # FLD: 0 K/UL — SIGNIFICANT CHANGE UP (ref 0–0)
NRBC BLD AUTO-RTO: 0 /100 WBCS — SIGNIFICANT CHANGE UP (ref 0–0)
PLATELET # BLD AUTO: 182 K/UL — SIGNIFICANT CHANGE UP (ref 150–400)
PMV BLD: 10.8 FL — SIGNIFICANT CHANGE UP (ref 7–13)
POTASSIUM SERPL-MCNC: 3.4 MMOL/L — LOW (ref 3.5–5.3)
POTASSIUM SERPL-SCNC: 3.4 MMOL/L — LOW (ref 3.5–5.3)
PROT SERPL-MCNC: 5.7 G/DL — LOW (ref 6–8.3)
RBC # BLD: 3.67 M/UL — LOW (ref 3.8–5.2)
RBC # FLD: 13.4 % — SIGNIFICANT CHANGE UP (ref 10.3–14.5)
SODIUM SERPL-SCNC: 133 MMOL/L — LOW (ref 135–145)
WBC # BLD: 6.27 K/UL — SIGNIFICANT CHANGE UP (ref 3.8–10.5)
WBC # FLD AUTO: 6.27 K/UL — SIGNIFICANT CHANGE UP (ref 3.8–10.5)

## 2025-07-13 RX ORDER — LANOLIN/MINERAL OIL/PETROLATUM
1 OINTMENT (GRAM) OPHTHALMIC (EYE)
Refills: 0 | Status: DISCONTINUED | OUTPATIENT
Start: 2025-07-13 | End: 2025-07-14

## 2025-07-13 RX ADMIN — POLYETHYLENE GLYCOL 3350 17 GRAM(S): 17 POWDER, FOR SOLUTION ORAL at 08:21

## 2025-07-13 RX ADMIN — Medication 88 MICROGRAM(S): at 05:30

## 2025-07-13 RX ADMIN — CEFPODOXIME PROXETIL 100 MILLIGRAM(S): 200 TABLET, FILM COATED ORAL at 17:15

## 2025-07-13 RX ADMIN — ATORVASTATIN CALCIUM 10 MILLIGRAM(S): 80 TABLET, FILM COATED ORAL at 21:09

## 2025-07-13 RX ADMIN — Medication 1 APPLICATION(S): at 17:18

## 2025-07-13 RX ADMIN — Medication 1 APPLICATION(S): at 21:25

## 2025-07-13 RX ADMIN — CALCIUM CARBONATE 1 TABLET(S): 750 TABLET ORAL at 08:21

## 2025-07-13 RX ADMIN — AMLODIPINE BESYLATE 5 MILLIGRAM(S): 10 TABLET ORAL at 05:30

## 2025-07-13 RX ADMIN — TAMSULOSIN HYDROCHLORIDE 0.4 MILLIGRAM(S): 0.4 CAPSULE ORAL at 21:10

## 2025-07-13 RX ADMIN — Medication 2000 UNIT(S): at 08:21

## 2025-07-13 RX ADMIN — LOSARTAN POTASSIUM 100 MILLIGRAM(S): 100 TABLET, FILM COATED ORAL at 05:29

## 2025-07-13 RX ADMIN — Medication 1 DROP(S): at 08:21

## 2025-07-13 RX ADMIN — Medication 2 TABLET(S): at 21:09

## 2025-07-13 RX ADMIN — CEFPODOXIME PROXETIL 100 MILLIGRAM(S): 200 TABLET, FILM COATED ORAL at 05:29

## 2025-07-13 RX ADMIN — CARVEDILOL 25 MILLIGRAM(S): 3.12 TABLET, FILM COATED ORAL at 05:30

## 2025-07-13 RX ADMIN — CARVEDILOL 25 MILLIGRAM(S): 3.12 TABLET, FILM COATED ORAL at 17:15

## 2025-07-13 RX ADMIN — ENOXAPARIN SODIUM 40 MILLIGRAM(S): 100 INJECTION SUBCUTANEOUS at 17:15

## 2025-07-13 NOTE — PROGRESS NOTE ADULT - ASSESSMENT
85yo female with PMH of HTN, HLD, hypothyroidism on aspirin presenting to emergency department as transfer from [place] due to CTH finding of 3 cm acute parenchymal hemorrhage in the left corona radiata and thalamus with intraventricular hemorrhage and bilateral lateral 3rd and 4th ventricles. Patient initially presented to  after being found down with right sided weakness and concern for possible facial droop and altered mental status. At outside hospital patient received DDAVP and Keppra 500mg.    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 Mechanism likely hypertensive.    NEURO: Neuro exam stable. Continue close monitoring for neurologic deterioration. Keep normotension. LDL 79, continue home statin, no high intensity statin indicated as mechanism is nonatherosclerotic. MRI Brain w/wo, results above. Repeat MRI Brain w/wo in 4-6 weeks to eval for underlying lesion or malformation. Physical therapy/Occupational therapy recommend AR    ANTITHROMBOTIC THERAPY: No AC/AP given hemorrhage    PULMONARY: protecting airway, saturating well     CARDIOVASCULAR: TTE: EF 60%, normal LA. Continue inpatient cardiac monitoring, no events. Cardiology recs appreciated for HTN. Continue Coreg 25mg BID, losartan 100mg daily, amlodipine 5mg daily                             SBP goal: < 140    GASTROINTESTINAL:  Dysphagia screen initially failed.  S/S eval 7/6 rec FEES, placed on diet, tolerating well. Constipation previously treated with suppository and had bowel movement on 7/11.        Diet: Regular    RENAL: BUN/Cr stable, good urine output ; hyponatremia - likely dehydration, improved with IVF. Urinary retention, started Flomax.      Na Goal: Greater than 135     Quintero: no    HEMATOLOGY: H/H stable, Platelets normal. BLE dopplers (7/5): no DVT     DVT ppx:  LMWH started 7/7    ID: afebrile, no leukocytosis ; UTI started on PO abx, UCx pending.     OTHER: R hand xray performed due to fall, no displaced fractures, middle finger possible underlying ligamentous or tendinous injury  Case and plan discussed with patient and and family at bedside, all questions addressed    DISPOSITION: AR per PT/OT eval, medically cleared for discharge    CORE MEASURES:        Admission NIHSS: 8     TPA: [] YES [x] NO      LDL/HDL: 79/56     Depression Screen: p     Statin Therapy: yes     Dysphagia Screen: [] PASS [x] FAIL initially     Smoking [] YES [x] NO      Afib [] YES [x] NO     Stroke Education [x] YES [] NO    Obtain screening lower extremity venous ultrasound in patients who meet 1 or more of the following criteria as patient is high risk for DVT/PE on admission:   [] History of DVT/PE  [] Hypercoagulable states (Factor V Leiden, Cancer, OCP, etc. )  [] Prolonged immobility (hemiplegia/hemiparesis/post operative or any other extended immobilization)  [] Transferred from outside facility (Rehab or Long term care)  [] Age </= to 50

## 2025-07-13 NOTE — PROGRESS NOTE ADULT - SUBJECTIVE AND OBJECTIVE BOX
THE PATIENT WAS SEEN AND EXAMINED BY ME WITH THE HOUSESTAFF AND STROKE TEAM DURING MORNING ROUNDS.     HPI: 83 yo female with PMH of HTN, HLD, hypothyroidism on aspirin presented to emergency department as transfer from [place] due to CTH finding of 3 cm acute parenchymal hemorrhage in the left corona radiata and thalamus with intraventricular hemorrhage and bilateral lateral 3rd and 4th ventricles. As per family patient was found down in shower this morning, had right sided weakness and concern for possible facial droop, altered mental status.      SUBJECTIVE: No events overnight. No new neurologic complaints.    acetaminophen     Tablet .. 650 milliGRAM(s) Oral every 6 hours PRN  amLODIPine   Tablet 5 milliGRAM(s) Oral daily  artificial  tears Solution 1 Drop(s) Both EYES four times a day PRN  atorvastatin 10 milliGRAM(s) Oral at bedtime  calcium carbonate    500 mG (Tums) Chewable 1 Tablet(s) Chew daily  carvedilol 25 milliGRAM(s) Oral every 12 hours  cefpodoxime 100 milliGRAM(s) Oral every 12 hours  chlorhexidine 4% Liquid 1 Application(s) Topical daily  cholecalciferol 2000 Unit(s) Oral daily  enoxaparin Injectable 40 milliGRAM(s) SubCutaneous <User Schedule>  levothyroxine 88 MICROGram(s) Oral daily  losartan 100 milliGRAM(s) Oral daily  petrolatum Ophthalmic Ointment 1 Application(s) Both EYES two times a day  polyethylene glycol 3350 17 Gram(s) Oral daily  senna 2 Tablet(s) Oral at bedtime  tamsulosin 0.4 milliGRAM(s) Oral at bedtime    PHYSICAL EXAM:   Vital Signs Last 24 Hrs  T(C): 36.9 (13 Jul 2025 08:00), Max: 37.3 (13 Jul 2025 00:00)  T(F): 98.5 (13 Jul 2025 08:00), Max: 99.1 (13 Jul 2025 00:00)  HR: 73 (13 Jul 2025 08:00) (70 - 76)  BP: 104/64 (13 Jul 2025 08:00) (104/64 - 131/66)  BP(mean): --  RR: 18 (13 Jul 2025 08:00) (18 - 18)  SpO2: 97% (13 Jul 2025 08:00) (94% - 97%)    Parameters below as of 13 Jul 2025 08:00  Patient On (Oxygen Delivery Method): room air    Arabic Translation provided by patient's daughter  General: No acute distress  Abdomen: Soft, nontender, nondistended   Extremities: No edema    NEUROLOGICAL EXAM:  Mental status: Eyes open to voice. Oriented to self and to hospital (with choices). Incorrect month (baseline). Speech fluent. Follows simple commands.  Cranial Nerves: No facial asymmetry, No dysarthria  Motor exam: RUE 4/5 with drift, RLE subtle drift. LUE/LLE antigravity without drifts  Sensation: Intact to light touch   Coordination/ Gait: Deferred  LABS:                        10.7   6.27  )-----------( 182      ( 13 Jul 2025 06:32 )             32.7    07-13    133[L]  |  103  |  20  ----------------------------<  91  3.4[L]   |  18[L]  |  0.56    Ca    8.4      13 Jul 2025 06:32    TPro  5.7[L]  /  Alb  3.1[L]  /  TBili  0.6  /  DBili  x   /  AST  17  /  ALT  17  /  AlkPhos  49  07-13    IMAGING: Reviewed by me.     MRI Brain w/wo (7/7/25): Redemonstrated left thalamic intraparenchymal hemorrhage with intraventricular extension, relatively unchanged as compared to prior CT head 7/6/2025 given differences in technique. No underlying enhancement is identified.    CTH 7/6/25: No significant interval change from CT brain 7/5/2025.    CTH 75/25 @ 21:29: Grossly unchanged left thalamic and corona radiata hemorrhage with intraventricular extension.    7/5/25 @ 13:53:  CT HEAD:  -No new or increasing intracranial hemorrhage since this morning's scan at outside hospital (separate record number, accession #[id number]).  -Left thalamic and corona radiata hemorrhage with intraventricular extension. Stable ventricular size but close follow-up advised.    CTA BRAIN: No arterial occlusion or significant stenosis. No aneurysm or other arterial source for the left parenchymal hemorrhage.  CTA NECK: No arterial steno-occlusive disease or evidence of dissection.

## 2025-07-13 NOTE — PROGRESS NOTE ADULT - SUBJECTIVE AND OBJECTIVE BOX
DATE OF SERVICE: 07-13-25 @ 13:15    Patient is a 84y old  Female who presents with a chief complaint of IPH w/ IVH (13 Jul 2025 12:52)      INTERVAL HISTORY: no acute events noted    REVIEW OF SYSTEMS:  CONSTITUTIONAL: No weakness  EYES/ENT: No visual changes;  No throat pain   NECK: No pain or stiffness  RESPIRATORY: No cough, wheezing; No shortness of breath  CARDIOVASCULAR: No chest pain or palpitations  GASTROINTESTINAL: No abdominal  pain. No nausea, vomiting, or hematemesis  GENITOURINARY: No dysuria, frequency or hematuria  NEUROLOGICAL: No stroke like symptoms  SKIN: No rashes      	  MEDICATIONS:  amLODIPine   Tablet 5 milliGRAM(s) Oral daily  carvedilol 25 milliGRAM(s) Oral every 12 hours  losartan 100 milliGRAM(s) Oral daily        PHYSICAL EXAM:  T(C): 36.9 (07-13-25 @ 08:00), Max: 37.3 (07-13-25 @ 00:00)  HR: 73 (07-13-25 @ 08:00) (70 - 75)  BP: 104/64 (07-13-25 @ 08:00) (104/64 - 131/66)  RR: 18 (07-13-25 @ 08:00) (18 - 18)  SpO2: 97% (07-13-25 @ 08:00) (94% - 97%)  Wt(kg): --  I&O's Summary    12 Jul 2025 07:01  -  13 Jul 2025 07:00  --------------------------------------------------------  IN: 990 mL / OUT: 450 mL / NET: 540 mL          Appearance: In no distress	  HEENT:    PERRL, EOMI	  Cardiovascular:  S1 S2, No JVD  Respiratory: Lungs clear to auscultation	  Gastrointestinal:  Soft, Non-tender, + BS	  Vascularature:  No edema of LE  Psychiatric: Appropriate affect   Neuro: no acute focal deficits                               10.7   6.27  )-----------( 182      ( 13 Jul 2025 06:32 )             32.7     07-13    133[L]  |  103  |  20  ----------------------------<  91  3.4[L]   |  18[L]  |  0.56    Ca    8.4      13 Jul 2025 06:32    TPro  5.7[L]  /  Alb  3.1[L]  /  TBili  0.6  /  DBili  x   /  AST  17  /  ALT  17  /  AlkPhos  49  07-13        Labs personally reviewed      ASSESSMENT/PLAN: 	    84F on ASA81, PMHx HTN, HLD pt found down, no external signs of trauma @ CTH w/Lt BG IPH, IVH , DDAVP + keppra given at .    Problem 1: ICH  - CT head 7/5 with Left thalamic and corona radiata hemorrhage with intraventricular extension  - Interval CTH stable hemorrhage, CTA head and neck negative  - Appreciate NSICU and Neurosurgery recs  - Seizure ppx: Keppra 500mg BID x 7 days     Problem 2: LE edema  - TTE (OP) 5/2025 - EF 65-70%, mild grade 1 DD, LA mod dilated, lipomatous intratrial septum, ascending aorta 3.8cm, mod-severe TR, mod MR, calcified mitral valve with prolapse of posterior mitral leaflet, mild-mod AR and IA  -- findings stable compared to TTE 2/2024  - Duplex US LE 5/2025 - no evidence of DVT  - Duplex US LE 7/5/25 - no evidence of DVT  - TTE 7/7 EF 60%, no rmwa. Compared to prior echo, the posterior leaflet of the mitral valve is not proalpsing on this study (though it was on the prior). There is trace mitral regurgitation. There is decreased tricuspid regurgitation.    Problem 3: HTN  - At home on candesartan 32 mg, metoprolol 50mg BID & Lasix 20mg   - In hospital, c/w Coreg 12.5mg BID, Losartan 100mg QD, amlodipine 5mg DQ    Problem 4: HLD  - On simvastatin 10mg QD at home  - In hospital, c/w Lipitor 10mg     Follows OP cardio Dr. Jeffrey Spivak Iolani Behrbom, AG-NP   Fahad Ibarra,  [facility]  Cardiovascular Medicine  [address] beds at the [facility], [address]  Available through call or text on [employer] TEAM[employer]  Office: [phone]   DATE OF SERVICE: 07-13-25 @ 13:15    Patient is a 84y old  Female who presents with a chief complaint of IPH w/ IVH (13 Jul 2025 12:52)      INTERVAL HISTORY: in no acute distress, family at bedside    REVIEW OF SYSTEMS:  CONSTITUTIONAL: No weakness  EYES/ENT: No visual changes;  No throat pain   NECK: No pain or stiffness  RESPIRATORY: No cough, wheezing; No shortness of breath  CARDIOVASCULAR: No chest pain or palpitations  GASTROINTESTINAL: No abdominal  pain. No nausea, vomiting, or hematemesis  GENITOURINARY: No dysuria, frequency or hematuria  NEUROLOGICAL: No stroke like symptoms  SKIN: No rashes      	  MEDICATIONS:  amLODIPine   Tablet 5 milliGRAM(s) Oral daily  carvedilol 25 milliGRAM(s) Oral every 12 hours  losartan 100 milliGRAM(s) Oral daily        PHYSICAL EXAM:  T(C): 36.9 (07-13-25 @ 08:00), Max: 37.3 (07-13-25 @ 00:00)  HR: 73 (07-13-25 @ 08:00) (70 - 75)  BP: 104/64 (07-13-25 @ 08:00) (104/64 - 131/66)  RR: 18 (07-13-25 @ 08:00) (18 - 18)  SpO2: 97% (07-13-25 @ 08:00) (94% - 97%)  Wt(kg): --  I&O's Summary    12 Jul 2025 07:01  -  13 Jul 2025 07:00  --------------------------------------------------------  IN: 990 mL / OUT: 450 mL / NET: 540 mL          Appearance: In no distress	  HEENT:    PERRL, EOMI	  Cardiovascular:  S1 S2, No JVD  Respiratory: Lungs clear to auscultation	  Gastrointestinal:  Soft, Non-tender, + BS	  Vascularature:  No edema of LE  Psychiatric: Appropriate affect   Neuro: no acute focal deficits                               10.7   6.27  )-----------( 182      ( 13 Jul 2025 06:32 )             32.7     07-13    133[L]  |  103  |  20  ----------------------------<  91  3.4[L]   |  18[L]  |  0.56    Ca    8.4      13 Jul 2025 06:32    TPro  5.7[L]  /  Alb  3.1[L]  /  TBili  0.6  /  DBili  x   /  AST  17  /  ALT  17  /  AlkPhos  49  07-13        Labs personally reviewed      ASSESSMENT/PLAN: 	    84F on ASA81, PMHx HTN, HLD pt found down, no external signs of trauma @ CTH w/Lt BG IPH, IVH , DDAVP + keppra given at .    Problem 1: ICH  - CT head 7/5 with Left thalamic and corona radiata hemorrhage with intraventricular extension  - Interval CTH stable hemorrhage, CTA head and neck negative  - Appreciate NSICU and Neurosurgery recs  - Seizure ppx: Keppra 500mg BID x 7 days     Problem 2: LE edema  - TTE (OP) 5/2025 - EF 65-70%, mild grade 1 DD, LA mod dilated, lipomatous intratrial septum, ascending aorta 3.8cm, mod-severe TR, mod MR, calcified mitral valve with prolapse of posterior mitral leaflet, mild-mod AR and DC  -- findings stable compared to TTE 2/2024  - Duplex US LE 5/2025 - no evidence of DVT  - Duplex US LE 7/5/25 - no evidence of DVT  - TTE 7/7 EF 60%, no rmwa. Compared to prior echo, the posterior leaflet of the mitral valve is not proalpsing on this study (though it was on the prior). There is trace mitral regurgitation. There is decreased tricuspid regurgitation.    Problem 3: HTN  - At home on candesartan 32 mg, metoprolol 50mg BID & Lasix 20mg   - In hospital, c/w Coreg 12.5mg BID, Losartan 100mg QD, amlodipine 5mg DQ    Problem 4: HLD  - On simvastatin 10mg QD at home  - In hospital, c/w Lipitor 10mg     Follows OP cardio Dr. Jeffrey Spivak Iolani Behrbom, AG-NP   Fahad Ibarra,  [facility]  Cardiovascular Medicine  [address] [facility], [address]  Available through call or text on [employer] TEAMs  Office: [phone]

## 2025-07-14 ENCOUNTER — INPATIENT (INPATIENT)
Facility: HOSPITAL | Age: 85
LOS: 21 days | Discharge: HOME | DRG: 66 | End: 2025-08-05
Attending: STUDENT IN AN ORGANIZED HEALTH CARE EDUCATION/TRAINING PROGRAM | Admitting: STUDENT IN AN ORGANIZED HEALTH CARE EDUCATION/TRAINING PROGRAM
Payer: MEDICARE

## 2025-07-14 VITALS
DIASTOLIC BLOOD PRESSURE: 72 MMHG | OXYGEN SATURATION: 96 % | HEART RATE: 73 BPM | SYSTOLIC BLOOD PRESSURE: 131 MMHG | WEIGHT: 106.48 LBS | RESPIRATION RATE: 15 BRPM | HEIGHT: 64 IN | TEMPERATURE: 98 F

## 2025-07-14 VITALS
DIASTOLIC BLOOD PRESSURE: 74 MMHG | RESPIRATION RATE: 18 BRPM | TEMPERATURE: 98 F | HEART RATE: 91 BPM | OXYGEN SATURATION: 97 % | SYSTOLIC BLOOD PRESSURE: 121 MMHG

## 2025-07-14 DIAGNOSIS — I61.8 OTHER NONTRAUMATIC INTRACEREBRAL HEMORRHAGE: ICD-10-CM

## 2025-07-14 LAB — SARS-COV-2 RNA SPEC QL NAA+PROBE: SIGNIFICANT CHANGE UP

## 2025-07-14 PROCEDURE — 80053 COMPREHEN METABOLIC PANEL: CPT

## 2025-07-14 PROCEDURE — 85610 PROTHROMBIN TIME: CPT

## 2025-07-14 PROCEDURE — 70450 CT HEAD/BRAIN W/O DYE: CPT

## 2025-07-14 PROCEDURE — 84443 ASSAY THYROID STIM HORMONE: CPT

## 2025-07-14 PROCEDURE — 86900 BLOOD TYPING SEROLOGIC ABO: CPT

## 2025-07-14 PROCEDURE — 99285 EMERGENCY DEPT VISIT HI MDM: CPT

## 2025-07-14 PROCEDURE — 87635 SARS-COV-2 COVID-19 AMP PRB: CPT

## 2025-07-14 PROCEDURE — 86850 RBC ANTIBODY SCREEN: CPT

## 2025-07-14 PROCEDURE — 80048 BASIC METABOLIC PNL TOTAL CA: CPT

## 2025-07-14 PROCEDURE — 97166 OT EVAL MOD COMPLEX 45 MIN: CPT

## 2025-07-14 PROCEDURE — 96376 TX/PRO/DX INJ SAME DRUG ADON: CPT

## 2025-07-14 PROCEDURE — 97535 SELF CARE MNGMENT TRAINING: CPT

## 2025-07-14 PROCEDURE — 86901 BLOOD TYPING SEROLOGIC RH(D): CPT

## 2025-07-14 PROCEDURE — 99232 SBSQ HOSP IP/OBS MODERATE 35: CPT

## 2025-07-14 PROCEDURE — 73120 X-RAY EXAM OF HAND: CPT

## 2025-07-14 PROCEDURE — 97161 PT EVAL LOW COMPLEX 20 MIN: CPT

## 2025-07-14 PROCEDURE — 96375 TX/PRO/DX INJ NEW DRUG ADDON: CPT

## 2025-07-14 PROCEDURE — 93970 EXTREMITY STUDY: CPT

## 2025-07-14 PROCEDURE — 80061 LIPID PANEL: CPT

## 2025-07-14 PROCEDURE — 70498 CT ANGIOGRAPHY NECK: CPT

## 2025-07-14 PROCEDURE — 93306 TTE W/DOPPLER COMPLETE: CPT

## 2025-07-14 PROCEDURE — 92610 EVALUATE SWALLOWING FUNCTION: CPT

## 2025-07-14 PROCEDURE — 92526 ORAL FUNCTION THERAPY: CPT

## 2025-07-14 PROCEDURE — A9585: CPT

## 2025-07-14 PROCEDURE — 87640 STAPH A DNA AMP PROBE: CPT

## 2025-07-14 PROCEDURE — 84480 ASSAY TRIIODOTHYRONINE (T3): CPT

## 2025-07-14 PROCEDURE — 96374 THER/PROPH/DIAG INJ IV PUSH: CPT

## 2025-07-14 PROCEDURE — 83735 ASSAY OF MAGNESIUM: CPT

## 2025-07-14 PROCEDURE — 92612 ENDOSCOPY SWALLOW (FEES) VID: CPT

## 2025-07-14 PROCEDURE — 99223 1ST HOSP IP/OBS HIGH 75: CPT | Mod: GC,25

## 2025-07-14 PROCEDURE — 97112 NEUROMUSCULAR REEDUCATION: CPT

## 2025-07-14 PROCEDURE — 84439 ASSAY OF FREE THYROXINE: CPT

## 2025-07-14 PROCEDURE — 92523 SPEECH SOUND LANG COMPREHEN: CPT

## 2025-07-14 PROCEDURE — 85576 BLOOD PLATELET AGGREGATION: CPT

## 2025-07-14 PROCEDURE — 83036 HEMOGLOBIN GLYCOSYLATED A1C: CPT

## 2025-07-14 PROCEDURE — 87186 SC STD MICRODIL/AGAR DIL: CPT

## 2025-07-14 PROCEDURE — 84100 ASSAY OF PHOSPHORUS: CPT

## 2025-07-14 PROCEDURE — 97530 THERAPEUTIC ACTIVITIES: CPT

## 2025-07-14 PROCEDURE — 87086 URINE CULTURE/COLONY COUNT: CPT

## 2025-07-14 PROCEDURE — 85025 COMPLETE CBC W/AUTO DIFF WBC: CPT

## 2025-07-14 PROCEDURE — 92507 TX SP LANG VOICE COMM INDIV: CPT

## 2025-07-14 PROCEDURE — 81001 URINALYSIS AUTO W/SCOPE: CPT

## 2025-07-14 PROCEDURE — 70496 CT ANGIOGRAPHY HEAD: CPT

## 2025-07-14 PROCEDURE — 85027 COMPLETE CBC AUTOMATED: CPT

## 2025-07-14 PROCEDURE — 70553 MRI BRAIN STEM W/O & W/DYE: CPT

## 2025-07-14 PROCEDURE — 97110 THERAPEUTIC EXERCISES: CPT

## 2025-07-14 PROCEDURE — 85730 THROMBOPLASTIN TIME PARTIAL: CPT

## 2025-07-14 PROCEDURE — 84436 ASSAY OF TOTAL THYROXINE: CPT

## 2025-07-14 PROCEDURE — 87641 MR-STAPH DNA AMP PROBE: CPT

## 2025-07-14 RX ORDER — ASPIRIN 325 MG
1 TABLET ORAL
Refills: 0 | DISCHARGE

## 2025-07-14 RX ORDER — CARVEDILOL 3.12 MG/1
25 TABLET, FILM COATED ORAL EVERY 12 HOURS
Refills: 0 | Status: DISCONTINUED | OUTPATIENT
Start: 2025-07-14 | End: 2025-07-21

## 2025-07-14 RX ORDER — TAMSULOSIN HYDROCHLORIDE 0.4 MG/1
0.4 CAPSULE ORAL AT BEDTIME
Refills: 0 | Status: DISCONTINUED | OUTPATIENT
Start: 2025-07-14 | End: 2025-07-23

## 2025-07-14 RX ORDER — LANOLIN/MINERAL OIL/PETROLATUM
1 OINTMENT (GRAM) OPHTHALMIC (EYE)
Qty: 0 | Refills: 0 | DISCHARGE
Start: 2025-07-14

## 2025-07-14 RX ORDER — CARVEDILOL 3.12 MG/1
12.5 TABLET, FILM COATED ORAL EVERY 12 HOURS
Refills: 0 | Status: DISCONTINUED | OUTPATIENT
Start: 2025-07-14 | End: 2025-07-14

## 2025-07-14 RX ORDER — CEFPODOXIME PROXETIL 200 MG/1
100 TABLET, FILM COATED ORAL EVERY 12 HOURS
Refills: 0 | Status: COMPLETED | OUTPATIENT
Start: 2025-07-14 | End: 2025-07-16

## 2025-07-14 RX ORDER — TAMSULOSIN HYDROCHLORIDE 0.4 MG/1
1 CAPSULE ORAL
Qty: 0 | Refills: 0 | DISCHARGE
Start: 2025-07-14

## 2025-07-14 RX ORDER — CALCIUM CARBONATE 750 MG/1
1 TABLET ORAL
Refills: 0 | Status: DISCONTINUED | OUTPATIENT
Start: 2025-07-14 | End: 2025-08-05

## 2025-07-14 RX ORDER — METOPROLOL SUCCINATE 50 MG/1
1 TABLET, EXTENDED RELEASE ORAL
Refills: 0 | DISCHARGE

## 2025-07-14 RX ORDER — CEFPODOXIME PROXETIL 200 MG/1
1 TABLET, FILM COATED ORAL
Qty: 0 | Refills: 0 | DISCHARGE
Start: 2025-07-14

## 2025-07-14 RX ORDER — LANOLIN/MINERAL OIL/PETROLATUM
1 OINTMENT (GRAM) OPHTHALMIC (EYE)
Refills: 0 | Status: DISCONTINUED | OUTPATIENT
Start: 2025-07-14 | End: 2025-08-05

## 2025-07-14 RX ORDER — ATORVASTATIN CALCIUM 80 MG/1
10 TABLET, FILM COATED ORAL AT BEDTIME
Refills: 0 | Status: DISCONTINUED | OUTPATIENT
Start: 2025-07-14 | End: 2025-07-23

## 2025-07-14 RX ORDER — SENNA 187 MG
2 TABLET ORAL AT BEDTIME
Refills: 0 | Status: DISCONTINUED | OUTPATIENT
Start: 2025-07-14 | End: 2025-07-23

## 2025-07-14 RX ORDER — CANDESARTAN CILEXETIL 8 MG/1
1 TABLET ORAL
Refills: 0 | DISCHARGE

## 2025-07-14 RX ORDER — LOSARTAN POTASSIUM 100 MG/1
100 TABLET, FILM COATED ORAL DAILY
Refills: 0 | Status: DISCONTINUED | OUTPATIENT
Start: 2025-07-15 | End: 2025-07-21

## 2025-07-14 RX ORDER — ENOXAPARIN SODIUM 100 MG/ML
30 INJECTION SUBCUTANEOUS
Refills: 0 | Status: DISCONTINUED | OUTPATIENT
Start: 2025-07-14 | End: 2025-08-05

## 2025-07-14 RX ORDER — FUROSEMIDE 10 MG/ML
1 INJECTION INTRAMUSCULAR; INTRAVENOUS
Refills: 0 | DISCHARGE

## 2025-07-14 RX ORDER — ACETAMINOPHEN 500 MG/5ML
650 LIQUID (ML) ORAL EVERY 6 HOURS
Refills: 0 | Status: DISCONTINUED | OUTPATIENT
Start: 2025-07-14 | End: 2025-08-05

## 2025-07-14 RX ORDER — LEVOTHYROXINE SODIUM 300 MCG
88 TABLET ORAL DAILY
Refills: 0 | Status: DISCONTINUED | OUTPATIENT
Start: 2025-07-15 | End: 2025-08-05

## 2025-07-14 RX ORDER — AMLODIPINE BESYLATE 10 MG/1
5 TABLET ORAL
Refills: 0 | Status: DISCONTINUED | OUTPATIENT
Start: 2025-07-15 | End: 2025-07-18

## 2025-07-14 RX ORDER — POLYETHYLENE GLYCOL 3350 17 G/17G
17 POWDER, FOR SOLUTION ORAL DAILY
Refills: 0 | Status: DISCONTINUED | OUTPATIENT
Start: 2025-07-15 | End: 2025-08-05

## 2025-07-14 RX ADMIN — AMLODIPINE BESYLATE 5 MILLIGRAM(S): 10 TABLET ORAL at 06:04

## 2025-07-14 RX ADMIN — ENOXAPARIN SODIUM 30 MILLIGRAM(S): 100 INJECTION SUBCUTANEOUS at 17:49

## 2025-07-14 RX ADMIN — Medication 2 TABLET(S): at 21:04

## 2025-07-14 RX ADMIN — CARVEDILOL 25 MILLIGRAM(S): 3.12 TABLET, FILM COATED ORAL at 17:50

## 2025-07-14 RX ADMIN — Medication 88 MICROGRAM(S): at 06:04

## 2025-07-14 RX ADMIN — POLYETHYLENE GLYCOL 3350 17 GRAM(S): 17 POWDER, FOR SOLUTION ORAL at 12:39

## 2025-07-14 RX ADMIN — CEFPODOXIME PROXETIL 100 MILLIGRAM(S): 200 TABLET, FILM COATED ORAL at 17:49

## 2025-07-14 RX ADMIN — TAMSULOSIN HYDROCHLORIDE 0.4 MILLIGRAM(S): 0.4 CAPSULE ORAL at 21:04

## 2025-07-14 RX ADMIN — ATORVASTATIN CALCIUM 10 MILLIGRAM(S): 80 TABLET, FILM COATED ORAL at 21:04

## 2025-07-14 RX ADMIN — Medication 1 APPLICATION(S): at 06:04

## 2025-07-14 RX ADMIN — LOSARTAN POTASSIUM 100 MILLIGRAM(S): 100 TABLET, FILM COATED ORAL at 06:03

## 2025-07-14 RX ADMIN — CEFPODOXIME PROXETIL 100 MILLIGRAM(S): 200 TABLET, FILM COATED ORAL at 06:04

## 2025-07-14 RX ADMIN — CALCIUM CARBONATE 1 TABLET(S): 750 TABLET ORAL at 12:39

## 2025-07-14 RX ADMIN — Medication 1 APPLICATION(S): at 17:50

## 2025-07-14 RX ADMIN — Medication 2000 UNIT(S): at 12:39

## 2025-07-14 RX ADMIN — CARVEDILOL 25 MILLIGRAM(S): 3.12 TABLET, FILM COATED ORAL at 06:04

## 2025-07-14 NOTE — PROGRESS NOTE ADULT - NS ATTEND AMEND GEN_ALL_CORE FT
I saw and examined the patient, reviewed diagnostic studies, and reviewed images personally. I agree with NP/PA’s history, exam, orders placed, and plan of care. Total care time spent, 50 minutes. Medical issues needing to be addressed include: L thalamic IPH w/ cerebral edema and surrounding brain compression and IVH, AMS, HTN, HLD, R hemiparesis. Stable. SBP 120s-130s stable the past 24 hrs. Slightly increased drowsiness, but overall exam stable. urinary incontinence. UA. dispo AR
I saw and examined the patient, reviewed diagnostic studies, and reviewed images personally. I agree with NP/PA’s history, exam, orders placed, and plan of care. Total care time spent, 50 minutes. Medical issues needing to be addressed include: L thalamic IPH w/ cerebral edema and surrounding brain compression and IVH, AMS, HTN, HLD, R hemiparesis. Stable. SBP 120s-130s. Due to increased drowsiness, urinary incontinence and being less interactive, UA obtained, +UTI, started PO abx. mentation much improved. Following commands, motor exam stable.
Patient care and plan discussed and reviewed with Advanced Care Provider. Plan as outlined above edited by me to reflect our discussion.   In addition, I participated in    - Ordering, reviewing, and interpreting labs, testing, and imaging.  - Reviewing prior hospitalization and outpatient records when necessary  - Counselling and educating patient and/or family regarding interpretation of aforementioned items and plan of care.  - Communicating with other health professionals (when not separately reported), and documenting clinical information in the electronic health record.
agree with sandy   spoke with shashi at bedside   abx for UTI  pending AR   Perez Young MD  Vascular Neurology  Office: [phone]
Patient care and plan discussed and reviewed with Advanced Care Provider. Plan as outlined above edited by me to reflect our discussion.   In addition, I participated in    - Ordering, reviewing, and interpreting labs, testing, and imaging.  - Reviewing prior hospitalization and outpatient records when necessary  - Counselling and educating patient and/or family regarding interpretation of aforementioned items and plan of care.  - Communicating with other health professionals (when not separately reported), and documenting clinical information in the electronic health record.
I saw and examined the patient, reviewed diagnostic studies, and reviewed images personally. I agree with NP/PA’s history, exam, orders placed, and plan of care. Total care time spent, 50 minutes. Medical issues needing to be addressed include: L thalamic IPH w/ cerebral edema and surrounding brain compression and IVH, AMS, HTN, HLD, R hemiparesis. Stable. SBP 120s-130s. Due to increased drowsiness, urinary incontinence and being less interactive, UA obtained, +UTI, started PO abx. F up urine culture - hx of chronic recurrent UTI. mentation much improved. Following commands, exam stable. Na improving (131).
seen with stroke team on morning rounds  agree with above   Perez Young MD  Vascular Neurology  Office: [phone]
I saw and examined the patient, reviewed diagnostic studies, and reviewed images personally. I agree with NP/PA’s history, exam, orders placed, and plan of care. Total care time spent, 50 minutes. Medical issues needing to be addressed include: L thalamic IPH w/ cerebral edema and surrounding brain compression and IVH, AMS, HTN, HLD, R hemiparesis. Stable. SBP 120s-130s now with one 60s outlier. Titrate antihypertensives to meet continued normotension goal.

## 2025-07-14 NOTE — PROGRESS NOTE ADULT - PROVIDER SPECIALTY LIST ADULT
Cardiology
Cardiology
NSICU
Neurology
Neurosurgery
Rehab Medicine
Cardiology
NSICU
NSICU
Neurology
Neurology
Neurosurgery
Cardiology
NSICU
Neurology
Rehab Medicine
Rehab Medicine
Neurology
NSICU

## 2025-07-14 NOTE — PROGRESS NOTE ADULT - ASSESSMENT
85yo female with PMH of HTN, HLD, hypothyroidism on aspirin presenting to emergency department as transfer from [place] due to CTH finding of 3 cm acute parenchymal hemorrhage in the left corona radiata and thalamus with intraventricular hemorrhage and bilateral lateral 3rd and 4th ventricles. Patient initially presented to  after being found down with right sided weakness and concern for possible facial droop and altered mental status. At outside hospital patient received DDAVP and Keppra 500mg.    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 Mechanism likely hypertensive.    NEURO: Neuro exam stable. Continue close monitoring for neurologic deterioration. Keep normotension. LDL 79, continue home statin, no high intensity statin indicated as mechanism is nonatherosclerotic. MRI Brain w/wo, results above. Repeat MRI Brain w/wo in 4-6 weeks to eval for underlying lesion or malformation. Physical therapy/Occupational therapy recommend AR    ANTITHROMBOTIC THERAPY: No AC/AP given hemorrhage    PULMONARY: protecting airway, saturating well     CARDIOVASCULAR: TTE: EF 60%, normal LA. Continue inpatient cardiac monitoring, no events. Cardiology recs appreciated for HTN. Continue Coreg 25mg BID, losartan 100mg daily, amlodipine 5mg daily                             SBP goal: < 140    GASTROINTESTINAL:  Dysphagia screen initially failed.  S/S eval 7/6 rec FEES, placed on diet, tolerating well. Constipation previously treated with suppository and had bowel movement on 7/11.        Diet: Regular    RENAL: BUN/Cr stable, good urine output ; hyponatremia - likely dehydration, improved with IVF. Urinary retention, started Flomax.      Na Goal: Greater than 135     Quintero: no    HEMATOLOGY: H/H stable, Platelets normal. BLE dopplers (7/5): no DVT     DVT ppx:  LMWH started 7/7    ID: afebrile, no leukocytosis ; UTI started on PO abx, UCx: Ecoli pending sensitivities     OTHER: R hand xray performed due to fall, no displaced fractures, middle finger possible underlying ligamentous or tendinous injury  Case and plan discussed with patient and and family at bedside, all questions addressed    DISPOSITION: AR per PT/OT eval, medically cleared for discharge    CORE MEASURES:        Admission NIHSS: 8     TPA: [] YES [x] NO      LDL/HDL: 79/56     Depression Screen: p     Statin Therapy: yes     Dysphagia Screen: [] PASS [x] FAIL initially     Smoking [] YES [x] NO      Afib [] YES [x] NO     Stroke Education [x] YES [] NO    Obtain screening lower extremity venous ultrasound in patients who meet 1 or more of the following criteria as patient is high risk for DVT/PE on admission:   [] History of DVT/PE  [] Hypercoagulable states (Factor V Leiden, Cancer, OCP, etc. )  [] Prolonged immobility (hemiplegia/hemiparesis/post operative or any other extended immobilization)  [] Transferred from outside facility (Rehab or Long term care)  [] Age </= to 50     85yo female with PMH of HTN, HLD, hypothyroidism on aspirin presenting to emergency department as transfer from [place] due to CTH finding of 3 cm acute parenchymal hemorrhage in the left corona radiata and thalamus with intraventricular hemorrhage and bilateral lateral 3rd and 4th ventricles. Patient initially presented to  after being found down with right sided weakness and concern for possible facial droop and altered mental status. At outside hospital patient received DDAVP and Keppra 500mg.    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 Mechanism likely hypertensive.    NEURO: Neuro exam stable. Continue close monitoring for neurologic deterioration. Keep normotension. LDL 79, continue home statin, no high intensity statin indicated as mechanism is nonatherosclerotic. MRI Brain w/wo, results above. Repeat MRI Brain w/wo in 4-6 weeks to eval for underlying lesion or malformation. Physical therapy/Occupational therapy recommend AR    ANTITHROMBOTIC THERAPY: No AC/AP given hemorrhage    PULMONARY: protecting airway, saturating well     CARDIOVASCULAR: TTE: EF 60%, normal LA. Continue inpatient cardiac monitoring, no events. Cardiology recs appreciated for HTN. Continue Coreg 25mg BID, losartan 100mg daily, amlodipine 5mg daily                             SBP goal: < 140    GASTROINTESTINAL:  Dysphagia screen initially failed.  S/S eval 7/6 rec FEES, placed on diet, tolerating well. Constipation previously treated with suppository and had bowel movement on 7/11.        Diet: Regular    RENAL: BUN/Cr stable, good urine output ; hyponatremia - likely dehydration, improved with IVF. Urinary retention, started Flomax.      Na Goal: Greater than 135     Quintero: no    HEMATOLOGY: H/H stable, Platelets normal. BLE dopplers (7/5): no DVT     DVT ppx:  LMWH started 7/7    ID: afebrile, no leukocytosis ; UTI started on PO abx, UCx: Ecoli     OTHER: R hand xray performed due to fall, no displaced fractures, middle finger possible underlying ligamentous or tendinous injury  Case and plan discussed with patient and family at bedside, all questions addressed    DISPOSITION: AR per PT/OT eval, medically cleared for discharge    CORE MEASURES:        Admission NIHSS: 8     TPA: [] YES [x] NO      LDL/HDL: 79/56     Depression Screen: p     Statin Therapy: yes     Dysphagia Screen: [] PASS [x] FAIL initially     Smoking [] YES [x] NO      Afib [] YES [x] NO     Stroke Education [x] YES [] NO    Obtain screening lower extremity venous ultrasound in patients who meet 1 or more of the following criteria as patient is high risk for DVT/PE on admission:   [] History of DVT/PE  [] Hypercoagulable states (Factor V Leiden, Cancer, OCP, etc. )  [] Prolonged immobility (hemiplegia/hemiparesis/post operative or any other extended immobilization)  [] Transferred from outside facility (Rehab or Long term care)  [] Age </= to 50

## 2025-07-14 NOTE — PROGRESS NOTE ADULT - REASON FOR ADMISSION
IPH w/ IVH
Lt IPH w/ IVH
IPH w/ IVH

## 2025-07-14 NOTE — PROGRESS NOTE ADULT - SUBJECTIVE AND OBJECTIVE BOX
Patient seen for rehab follow up  CC:  [facility]    no new complaints  decreased appetite         REVIEW OF SYSTEMS  Constitutional - No fever,  No fatigue  HEENT - No vertigo, No neck pain  Neurological - No headaches, No memory loss  Psychiatric - No depression, No anxiety    FUNCTIONAL PROGRESS  PT 7/14  transfers mod to max assist with non mech lift     OT 7/14  bed mobility mod assist   transfers max assist, non mech lift   dressing  max assist     VITALS  T(C): 36.3 (07-14-25 @ 08:00), Max: 37.3 (07-13-25 @ 23:45)  HR: 85 (07-14-25 @ 08:00) (72 - 85)  BP: 102/67 (07-14-25 @ 08:00) (102/67 - 146/72)  RR: 18 (07-14-25 @ 08:00) (18 - 18)  SpO2: 97% (07-14-25 @ 08:00) (96% - 97%)  Wt(kg): --    MEDICATIONS   acetaminophen     Tablet .. 650 milliGRAM(s) every 6 hours PRN  amLODIPine   Tablet 5 milliGRAM(s) daily  artificial  tears Solution 1 Drop(s) four times a day PRN  atorvastatin 10 milliGRAM(s) at bedtime  calcium carbonate    500 mG (Tums) Chewable 1 Tablet(s) daily  carvedilol 25 milliGRAM(s) every 12 hours  cefpodoxime 100 milliGRAM(s) every 12 hours  chlorhexidine 4% Liquid 1 Application(s) daily  cholecalciferol 2000 Unit(s) daily  enoxaparin Injectable 40 milliGRAM(s) <User Schedule>  levothyroxine 88 MICROGram(s) daily  losartan 100 milliGRAM(s) daily  petrolatum Ophthalmic Ointment 1 Application(s) two times a day  polyethylene glycol 3350 17 Gram(s) daily  senna 2 Tablet(s) at bedtime  tamsulosin 0.4 milliGRAM(s) at bedtime      RECENT LABS - Reviewed                        10.7   6.27  )-----------( 182      ( 13 Jul 2025 06:32 )             32.7     07-13    133[L]  |  103  |  20  ----------------------------<  91  3.4[L]   |  18[L]  |  0.56    Ca    8.4      13 Jul 2025 06:32    TPro  5.7[L]  /  Alb  3.1[L]  /  TBili  0.6  /  DBili  x   /  AST  17  /  ALT  17  /  AlkPhos  49  07-13      Urinalysis Basic - ( 13 Jul 2025 06:32 )    Color: x / Appearance: x / SG: x / pH: x  Gluc: 91 mg/dL / Ketone: x  / Bili: x / Urobili: x   Blood: x / Protein: x / Nitrite: x   Leuk Esterase: x / RBC: x / WBC x   Sq Epi: x / Non Sq Epi: x / Bacteria: x          < from: CT Head No Cont (07.06.25 @ 09:22) >    FINDINGS:    No significant interval change in appearance of 3.1 x 1.5 cm left   thalamic acute parenchymal hemorrhage with surrounding edema.    Similar mild to moderate intraventricular extension.    Ventricles similar in size, no hydrocephalus. No midline shift or   effacement of basal cisterns.    IMPRESSION:    No significant interval change from CT brain 7/5/2025.    < end of copied text >    < from: MR Head w/wo IV Cont (07.07.25 @ 13:04) >    IMPRESSION:    Redemonstrated left thalamic intraparenchymal hemorrhage with   intraventricular extension, relatively unchanged as compared to prior CT   head 7/6/2025 given differences in technique. No underlying enhancement   is identified.    --- End of Report ---      < end of copied text >      ----------------------------------------------------------------------------------------  PHYSICAL EXAM  Constitutional - NAD, Comfortable, in chair   Chest - Breathing comfortably on room air   Cardiovascular - S1S2   Extremities - No C/C/E, No calf tenderness   Neurologic Exam -   follows commands                 Cognitive -awake, alert     Communication -speech fluent responds to questions      Motor- RUE drift, RLE 4/5      Psychiatric - Mood stable, Affect WNL  ----------------------------------------------------------------------------------------  ASSESSMENT/PLAN  84yFemale h/o HTN< hypothyroid with functional deficits 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  CT stable as above, MRI with left thalamic IPH  s/p FEES, regular diet   Pain - Tylenol  DVT PPX - SCDs lovenox   Rehab -    patient requires mod assist with mobility, +cognitive deficits   continue bedside therapy while admitted to prevent secondary complications of immobility, bed mobility, transfer training, progressive ambulation, equipment evaluation, ADLs   OOB throughout the day with staff, OOB to chair with meals/3 hours daily          Recommend ACUTE inpatient rehabilitation for the functional deficits consisting of 3 hours of multidisciplinary intense therapy/day x 5 days/week x 2-4 weeks depending on progress at rehabilitation facility, 24 hour RN/daily PMR physician for comorbid medical management.      Patient will be able to participate in and benefit from intense rehabilitation therapies for 3 hours a day x 5 days/week to maximize independence.     Rehab recommendations are dependent on functional progress and participation with bedside therapy     daughter reports they are planning to provide assist in the home on discharge from rehab     Will continue to follow for ongoing rehab needs and recommendations.                 35 minutes spent on total encounter  with chart review of PT/OT/SLP notes, exam, imaging, counseling and education on inpatient rehabilitation, coordination of care with rehab team and

## 2025-07-14 NOTE — PROGRESS NOTE ADULT - SUBJECTIVE AND OBJECTIVE BOX
THE PATIENT WAS SEEN AND EXAMINED BY ME WITH THE HOUSESTAFF AND STROKE TEAM DURING MORNING ROUNDS.     HPI: 85 yo female with PMH of HTN, HLD, hypothyroidism on aspirin presented to emergency department as transfer from [place] due to CTH finding of 3 cm acute parenchymal hemorrhage in the left corona radiata and thalamus with intraventricular hemorrhage and bilateral lateral 3rd and 4th ventricles. As per family patient was found down in shower this morning, had right sided weakness and concern for possible facial droop, altered mental status.      SUBJECTIVE: No events overnight. No new neurologic complaints.    acetaminophen     Tablet .. 650 milliGRAM(s) Oral every 6 hours PRN  amLODIPine   Tablet 5 milliGRAM(s) Oral daily  artificial  tears Solution 1 Drop(s) Both EYES four times a day PRN  atorvastatin 10 milliGRAM(s) Oral at bedtime  calcium carbonate    500 mG (Tums) Chewable 1 Tablet(s) Chew daily  carvedilol 25 milliGRAM(s) Oral every 12 hours  cefpodoxime 100 milliGRAM(s) Oral every 12 hours  chlorhexidine 4% Liquid 1 Application(s) Topical daily  cholecalciferol 2000 Unit(s) Oral daily  enoxaparin Injectable 40 milliGRAM(s) SubCutaneous <User Schedule>  levothyroxine 88 MICROGram(s) Oral daily  losartan 100 milliGRAM(s) Oral daily  petrolatum Ophthalmic Ointment 1 Application(s) Both EYES two times a day  polyethylene glycol 3350 17 Gram(s) Oral daily  senna 2 Tablet(s) Oral at bedtime  tamsulosin 0.4 milliGRAM(s) Oral at bedtime      PHYSICAL EXAM:   Vital Signs Last 24 Hrs  T(C): 37.1 (14 Jul 2025 04:20), Max: 37.3 (13 Jul 2025 23:45)  T(F): 98.7 (14 Jul 2025 04:20), Max: 99.1 (13 Jul 2025 23:45)  HR: 72 (14 Jul 2025 04:20) (72 - 78)  BP: 132/76 (14 Jul 2025 04:20) (104/64 - 146/72)  BP(mean): --  RR: 18 (14 Jul 2025 04:20) (18 - 18)  SpO2: 96% (14 Jul 2025 04:20) (96% - 97%)    Parameters below as of 14 Jul 2025 04:20  Patient On (Oxygen Delivery Method): room air          [place]grin Translation provided by patient's daughter  General: No acute distress  Abdomen: Soft, nontender, nondistended   Extremities: No edema    NEUROLOGICAL EXAM:  Mental status: Eyes open to voice. Oriented to self and to hospital (with choices). Incorrect month (baseline). Speech fluent. Follows simple commands.  Cranial Nerves: No facial asymmetry, No dysarthria  Motor exam: RUE 4/5 with drift, RLE subtle drift. LUE/LLE antigravity without drifts  Sensation: Intact to light touch   Coordination/ Gait: Deferred    LABS:                        10.7   6.27  )-----------( 182      ( 13 Jul 2025 06:32 )             32.7    07-13    133[L]  |  103  |  20  ----------------------------<  91  3.4[L]   |  18[L]  |  0.56    Ca    8.4      13 Jul 2025 06:32    TPro  5.7[L]  /  Alb  3.1[L]  /  TBili  0.6  /  DBili  x   /  AST  17  /  ALT  17  /  AlkPhos  49  07-13        IMAGING: Reviewed by me.       MRI Brain w/wo (7/7/25): Redemonstrated left thalamic intraparenchymal hemorrhage with intraventricular extension, relatively unchanged as compared to prior CT head 7/6/2025 given differences in technique. No underlying enhancement is identified.    CTH 7/6/25: No significant interval change from CT brain 7/5/2025.    CTH 75/25 @ 21:29: Grossly unchanged left thalamic and corona radiata hemorrhage with intraventricular extension.    7/5/25 @ 13:53:  CT HEAD:  -No new or increasing intracranial hemorrhage since this morning's scan at outside hospital (separate record number, accession #[id number]).  -Left thalamic and corona radiata hemorrhage with intraventricular extension. Stable ventricular size but close follow-up advised.    CTA BRAIN: No arterial occlusion or significant stenosis. No aneurysm or other arterial source for the left parenchymal hemorrhage.  CTA NECK: No arterial steno-occlusive disease or evidence of dissection.

## 2025-07-14 NOTE — PROGRESS NOTE ADULT - SUBJECTIVE AND OBJECTIVE BOX
DATE OF SERVICE: 07-14-25     Patient is a 84y old  Female who presents with a chief complaint of IPH w/ IVH (14 Jul 2025 13:14)      INTERVAL HISTORY: feels ok    TELEMETRY Personally reviewed: no events  	  MEDICATIONS:  amLODIPine   Tablet 5 milliGRAM(s) Oral daily  carvedilol 25 milliGRAM(s) Oral every 12 hours  losartan 100 milliGRAM(s) Oral daily        PHYSICAL EXAM:  T(C): 36.4 (07-14-25 @ 12:00), Max: 37.3 (07-13-25 @ 23:45)  HR: 91 (07-14-25 @ 12:00) (72 - 91)  BP: 121/74 (07-14-25 @ 12:00) (102/67 - 146/72)  RR: 18 (07-14-25 @ 12:00) (18 - 18)  SpO2: 97% (07-14-25 @ 12:00) (96% - 97%)  Wt(kg): --  I&O's Summary    13 Jul 2025 07:01  -  14 Jul 2025 07:00  --------------------------------------------------------  IN: 480 mL / OUT: 1500 mL / NET: -1020 mL    14 Jul 2025 07:01  -  14 Jul 2025 14:39  --------------------------------------------------------  IN: 360 mL / OUT: 700 mL / NET: -340 mL          Appearance: In no distress	  HEENT:    PERRL, EOMI	  Cardiovascular:  S1 S2, No JVD  Respiratory: Lungs clear to auscultation	  Gastrointestinal:  Soft, Non-tender, + BS	  Vascularature:  No edema of LE  Psychiatric: Appropriate affect   Neuro: no acute focal deficits                               10.7   6.27  )-----------( 182      ( 13 Jul 2025 06:32 )             32.7     07-13    133[L]  |  103  |  20  ----------------------------<  91  3.4[L]   |  18[L]  |  0.56    Ca    8.4      13 Jul 2025 06:32    TPro  5.7[L]  /  Alb  3.1[L]  /  TBili  0.6  /  DBili  x   /  AST  17  /  ALT  17  /  AlkPhos  49  07-13        Labs personally reviewed      ASSESSMENT/PLAN: 	    84F on ASA81, PMHx HTN, HLD pt found down, no external signs of trauma @ CTH w/Lt BG IPH, IVH , DDAVP + keppra given at .    Problem 1: ICH  - CT head 7/5 with Left thalamic and corona radiata hemorrhage with intraventricular extension  - Interval CTH stable hemorrhage, CTA head and neck negative  - Appreciate NSICU and Neurosurgery recs  - Seizure ppx: Keppra 500mg BID x 7 days     Problem 2: LE edema  - TTE (OP) 5/2025 - EF 65-70%, mild grade 1 DD, LA mod dilated, lipomatous intratrial septum, ascending aorta 3.8cm, mod-severe TR, mod MR, calcified mitral valve with prolapse of posterior mitral leaflet, mild-mod AR and SD  -- findings stable compared to TTE 2/2024  - Duplex US LE 5/2025 - no evidence of DVT  - Duplex US LE 7/5/25 - no evidence of DVT  - TTE 7/7 EF 60%, no rmwa. Compared to prior echo, the posterior leaflet of the mitral valve is not proalpsing on this study (though it was on the prior). There is trace mitral regurgitation. There is decreased tricuspid regurgitation.    Problem 3: HTN  - At home on candesartan 32 mg, metoprolol 50mg BID & Lasix 20mg   - In hospital, c/w Coreg 12.5mg BID, Losartan 100mg QD, amlodipine 5mg DQ    Problem 4: HLD  - On simvastatin 10mg QD at home  - In hospital, c/w Lipitor 10mg     Follows OP cardio Dr. Kartik Ibarra, DO [facility]  Cardiovascular Medicine  [address], [address]  Office: [phone]  Available via Text/call on Microsoft Teams

## 2025-07-15 LAB
-  AMOXICILLIN/CLAVULANIC ACID: SIGNIFICANT CHANGE UP
-  AMPICILLIN/SULBACTAM: SIGNIFICANT CHANGE UP
-  AMPICILLIN: SIGNIFICANT CHANGE UP
-  AZTREONAM: SIGNIFICANT CHANGE UP
-  CEFAZOLIN: SIGNIFICANT CHANGE UP
-  CEFEPIME: SIGNIFICANT CHANGE UP
-  CEFOXITIN: SIGNIFICANT CHANGE UP
-  CEFTRIAXONE: SIGNIFICANT CHANGE UP
-  CEFUROXIME: SIGNIFICANT CHANGE UP
-  CIPROFLOXACIN: SIGNIFICANT CHANGE UP
-  ERTAPENEM: SIGNIFICANT CHANGE UP
-  GENTAMICIN: SIGNIFICANT CHANGE UP
-  IMIPENEM: SIGNIFICANT CHANGE UP
-  LEVOFLOXACIN: SIGNIFICANT CHANGE UP
-  MEROPENEM: SIGNIFICANT CHANGE UP
-  NITROFURANTOIN: SIGNIFICANT CHANGE UP
-  PIPERACILLIN/TAZOBACTAM: SIGNIFICANT CHANGE UP
-  TIGECYCLINE: SIGNIFICANT CHANGE UP
-  TOBRAMYCIN: SIGNIFICANT CHANGE UP
-  TRIMETHOPRIM/SULFAMETHOXAZOLE: SIGNIFICANT CHANGE UP
ALBUMIN SERPL ELPH-MCNC: 2.7 G/DL — LOW (ref 3.3–5)
ALP SERPL-CCNC: 55 U/L — SIGNIFICANT CHANGE UP (ref 40–120)
ALT FLD-CCNC: 17 U/L — SIGNIFICANT CHANGE UP (ref 10–45)
ANION GAP SERPL CALC-SCNC: 8 MMOL/L — SIGNIFICANT CHANGE UP (ref 5–17)
AST SERPL-CCNC: 20 U/L — SIGNIFICANT CHANGE UP (ref 10–40)
BASOPHILS # BLD AUTO: 0.04 K/UL — SIGNIFICANT CHANGE UP (ref 0–0.2)
BASOPHILS NFR BLD AUTO: 0.7 % — SIGNIFICANT CHANGE UP (ref 0–2)
BILIRUB SERPL-MCNC: 0.5 MG/DL — SIGNIFICANT CHANGE UP (ref 0.2–1.2)
BUN SERPL-MCNC: 12 MG/DL — SIGNIFICANT CHANGE UP (ref 7–23)
CALCIUM SERPL-MCNC: 8.6 MG/DL — SIGNIFICANT CHANGE UP (ref 8.4–10.5)
CHLORIDE SERPL-SCNC: 102 MMOL/L — SIGNIFICANT CHANGE UP (ref 96–108)
CO2 SERPL-SCNC: 25 MMOL/L — SIGNIFICANT CHANGE UP (ref 22–31)
CREAT SERPL-MCNC: 0.54 MG/DL — SIGNIFICANT CHANGE UP (ref 0.5–1.3)
CULTURE RESULTS: ABNORMAL
EGFR: 92 ML/MIN/1.73M2 — SIGNIFICANT CHANGE UP
EGFR: 92 ML/MIN/1.73M2 — SIGNIFICANT CHANGE UP
EOSINOPHIL # BLD AUTO: 0.18 K/UL — SIGNIFICANT CHANGE UP (ref 0–0.5)
EOSINOPHIL NFR BLD AUTO: 3.1 % — SIGNIFICANT CHANGE UP (ref 0–6)
GLUCOSE SERPL-MCNC: 101 MG/DL — HIGH (ref 70–99)
HCT VFR BLD CALC: 35.5 % — SIGNIFICANT CHANGE UP (ref 34.5–45)
HGB BLD-MCNC: 12.1 G/DL — SIGNIFICANT CHANGE UP (ref 11.5–15.5)
IMM GRANULOCYTES NFR BLD AUTO: 0.3 % — SIGNIFICANT CHANGE UP (ref 0–0.9)
LYMPHOCYTES # BLD AUTO: 1.22 K/UL — SIGNIFICANT CHANGE UP (ref 1–3.3)
LYMPHOCYTES # BLD AUTO: 20.7 % — SIGNIFICANT CHANGE UP (ref 13–44)
MCHC RBC-ENTMCNC: 29.3 PG — SIGNIFICANT CHANGE UP (ref 27–34)
MCHC RBC-ENTMCNC: 34.1 G/DL — SIGNIFICANT CHANGE UP (ref 32–36)
MCV RBC AUTO: 86 FL — SIGNIFICANT CHANGE UP (ref 80–100)
METHOD TYPE: SIGNIFICANT CHANGE UP
MONOCYTES # BLD AUTO: 0.63 K/UL — SIGNIFICANT CHANGE UP (ref 0–0.9)
MONOCYTES NFR BLD AUTO: 10.7 % — SIGNIFICANT CHANGE UP (ref 2–14)
NEUTROPHILS # BLD AUTO: 3.79 K/UL — SIGNIFICANT CHANGE UP (ref 1.8–7.4)
NEUTROPHILS NFR BLD AUTO: 64.5 % — SIGNIFICANT CHANGE UP (ref 43–77)
NRBC BLD AUTO-RTO: 0 /100 WBCS — SIGNIFICANT CHANGE UP (ref 0–0)
ORGANISM # SPEC MICROSCOPIC CNT: ABNORMAL
ORGANISM # SPEC MICROSCOPIC CNT: ABNORMAL
PLATELET # BLD AUTO: 211 K/UL — SIGNIFICANT CHANGE UP (ref 150–400)
POTASSIUM SERPL-MCNC: 3.3 MMOL/L — LOW (ref 3.5–5.3)
POTASSIUM SERPL-SCNC: 3.3 MMOL/L — LOW (ref 3.5–5.3)
PROT SERPL-MCNC: 6.3 G/DL — SIGNIFICANT CHANGE UP (ref 6–8.3)
RBC # BLD: 4.13 M/UL — SIGNIFICANT CHANGE UP (ref 3.8–5.2)
RBC # FLD: 13 % — SIGNIFICANT CHANGE UP (ref 10.3–14.5)
SODIUM SERPL-SCNC: 135 MMOL/L — SIGNIFICANT CHANGE UP (ref 135–145)
SPECIMEN SOURCE: SIGNIFICANT CHANGE UP
WBC # BLD: 5.88 K/UL — SIGNIFICANT CHANGE UP (ref 3.8–10.5)
WBC # FLD AUTO: 5.88 K/UL — SIGNIFICANT CHANGE UP (ref 3.8–10.5)

## 2025-07-15 PROCEDURE — 99223 1ST HOSP IP/OBS HIGH 75: CPT

## 2025-07-15 RX ADMIN — Medication 2000 UNIT(S): at 11:22

## 2025-07-15 RX ADMIN — Medication 2 TABLET(S): at 21:53

## 2025-07-15 RX ADMIN — Medication 40 MILLIEQUIVALENT(S): at 11:22

## 2025-07-15 RX ADMIN — Medication 1 APPLICATION(S): at 17:19

## 2025-07-15 RX ADMIN — CARVEDILOL 25 MILLIGRAM(S): 3.12 TABLET, FILM COATED ORAL at 06:04

## 2025-07-15 RX ADMIN — ATORVASTATIN CALCIUM 10 MILLIGRAM(S): 80 TABLET, FILM COATED ORAL at 21:53

## 2025-07-15 RX ADMIN — CEFPODOXIME PROXETIL 100 MILLIGRAM(S): 200 TABLET, FILM COATED ORAL at 06:04

## 2025-07-15 RX ADMIN — POLYETHYLENE GLYCOL 3350 17 GRAM(S): 17 POWDER, FOR SOLUTION ORAL at 11:22

## 2025-07-15 RX ADMIN — Medication 1 APPLICATION(S): at 06:05

## 2025-07-15 RX ADMIN — LOSARTAN POTASSIUM 100 MILLIGRAM(S): 100 TABLET, FILM COATED ORAL at 06:04

## 2025-07-15 RX ADMIN — CARVEDILOL 25 MILLIGRAM(S): 3.12 TABLET, FILM COATED ORAL at 17:19

## 2025-07-15 RX ADMIN — TAMSULOSIN HYDROCHLORIDE 0.4 MILLIGRAM(S): 0.4 CAPSULE ORAL at 21:53

## 2025-07-15 RX ADMIN — CEFPODOXIME PROXETIL 100 MILLIGRAM(S): 200 TABLET, FILM COATED ORAL at 17:19

## 2025-07-15 RX ADMIN — Medication 88 MICROGRAM(S): at 06:04

## 2025-07-15 RX ADMIN — ENOXAPARIN SODIUM 30 MILLIGRAM(S): 100 INJECTION SUBCUTANEOUS at 17:19

## 2025-07-15 RX ADMIN — Medication 40 MILLIGRAM(S): at 06:04

## 2025-07-16 PROCEDURE — 92523 SPEECH SOUND LANG COMPREHEN: CPT | Mod: GN

## 2025-07-16 PROCEDURE — 97161 PT EVAL LOW COMPLEX 20 MIN: CPT | Mod: GP

## 2025-07-16 PROCEDURE — 85025 COMPLETE CBC W/AUTO DIFF WBC: CPT

## 2025-07-16 PROCEDURE — 99233 SBSQ HOSP IP/OBS HIGH 50: CPT | Mod: GC

## 2025-07-16 PROCEDURE — 80053 COMPREHEN METABOLIC PANEL: CPT

## 2025-07-16 PROCEDURE — 99232 SBSQ HOSP IP/OBS MODERATE 35: CPT

## 2025-07-16 PROCEDURE — 87635 SARS-COV-2 COVID-19 AMP PRB: CPT

## 2025-07-16 PROCEDURE — 36415 COLL VENOUS BLD VENIPUNCTURE: CPT

## 2025-07-16 PROCEDURE — 92610 EVALUATE SWALLOWING FUNCTION: CPT | Mod: GN

## 2025-07-16 PROCEDURE — 97165 OT EVAL LOW COMPLEX 30 MIN: CPT | Mod: GO

## 2025-07-16 RX ORDER — MELATONIN 5 MG
3 TABLET ORAL
Refills: 0 | Status: DISCONTINUED | OUTPATIENT
Start: 2025-07-16 | End: 2025-07-21

## 2025-07-16 RX ADMIN — CARVEDILOL 25 MILLIGRAM(S): 3.12 TABLET, FILM COATED ORAL at 17:32

## 2025-07-16 RX ADMIN — CARVEDILOL 25 MILLIGRAM(S): 3.12 TABLET, FILM COATED ORAL at 05:43

## 2025-07-16 RX ADMIN — AMLODIPINE BESYLATE 5 MILLIGRAM(S): 10 TABLET ORAL at 07:40

## 2025-07-16 RX ADMIN — Medication 1 APPLICATION(S): at 17:34

## 2025-07-16 RX ADMIN — Medication 40 MILLIGRAM(S): at 05:43

## 2025-07-16 RX ADMIN — ATORVASTATIN CALCIUM 10 MILLIGRAM(S): 80 TABLET, FILM COATED ORAL at 21:13

## 2025-07-16 RX ADMIN — CEFPODOXIME PROXETIL 100 MILLIGRAM(S): 200 TABLET, FILM COATED ORAL at 05:43

## 2025-07-16 RX ADMIN — Medication 1 APPLICATION(S): at 05:43

## 2025-07-16 RX ADMIN — ENOXAPARIN SODIUM 30 MILLIGRAM(S): 100 INJECTION SUBCUTANEOUS at 17:32

## 2025-07-16 RX ADMIN — POLYETHYLENE GLYCOL 3350 17 GRAM(S): 17 POWDER, FOR SOLUTION ORAL at 11:01

## 2025-07-16 RX ADMIN — Medication 88 MICROGRAM(S): at 05:43

## 2025-07-16 RX ADMIN — TAMSULOSIN HYDROCHLORIDE 0.4 MILLIGRAM(S): 0.4 CAPSULE ORAL at 21:13

## 2025-07-16 RX ADMIN — Medication 2 TABLET(S): at 21:13

## 2025-07-16 RX ADMIN — Medication 2000 UNIT(S): at 11:01

## 2025-07-16 RX ADMIN — LOSARTAN POTASSIUM 100 MILLIGRAM(S): 100 TABLET, FILM COATED ORAL at 05:43

## 2025-07-16 RX ADMIN — Medication 3 MILLIGRAM(S): at 21:13

## 2025-07-17 LAB
ALBUMIN SERPL ELPH-MCNC: 2.8 G/DL — LOW (ref 3.3–5)
ALP SERPL-CCNC: 52 U/L — SIGNIFICANT CHANGE UP (ref 40–120)
ALT FLD-CCNC: 28 U/L — SIGNIFICANT CHANGE UP (ref 10–45)
ANION GAP SERPL CALC-SCNC: 10 MMOL/L — SIGNIFICANT CHANGE UP (ref 5–17)
AST SERPL-CCNC: 30 U/L — SIGNIFICANT CHANGE UP (ref 10–40)
BILIRUB SERPL-MCNC: 0.3 MG/DL — SIGNIFICANT CHANGE UP (ref 0.2–1.2)
BUN SERPL-MCNC: 14 MG/DL — SIGNIFICANT CHANGE UP (ref 7–23)
CALCIUM SERPL-MCNC: 8.6 MG/DL — SIGNIFICANT CHANGE UP (ref 8.4–10.5)
CHLORIDE SERPL-SCNC: 102 MMOL/L — SIGNIFICANT CHANGE UP (ref 96–108)
CO2 SERPL-SCNC: 24 MMOL/L — SIGNIFICANT CHANGE UP (ref 22–31)
CREAT SERPL-MCNC: 0.53 MG/DL — SIGNIFICANT CHANGE UP (ref 0.5–1.3)
EGFR: 92 ML/MIN/1.73M2 — SIGNIFICANT CHANGE UP
EGFR: 92 ML/MIN/1.73M2 — SIGNIFICANT CHANGE UP
GLUCOSE SERPL-MCNC: 95 MG/DL — SIGNIFICANT CHANGE UP (ref 70–99)
HCT VFR BLD CALC: 33 % — LOW (ref 34.5–45)
HGB BLD-MCNC: 11.2 G/DL — LOW (ref 11.5–15.5)
MCHC RBC-ENTMCNC: 29.5 PG — SIGNIFICANT CHANGE UP (ref 27–34)
MCHC RBC-ENTMCNC: 33.9 G/DL — SIGNIFICANT CHANGE UP (ref 32–36)
MCV RBC AUTO: 86.8 FL — SIGNIFICANT CHANGE UP (ref 80–100)
NRBC BLD AUTO-RTO: 0 /100 WBCS — SIGNIFICANT CHANGE UP (ref 0–0)
PLATELET # BLD AUTO: 248 K/UL — SIGNIFICANT CHANGE UP (ref 150–400)
POTASSIUM SERPL-MCNC: 3.6 MMOL/L — SIGNIFICANT CHANGE UP (ref 3.5–5.3)
POTASSIUM SERPL-SCNC: 3.6 MMOL/L — SIGNIFICANT CHANGE UP (ref 3.5–5.3)
PROT SERPL-MCNC: 6.2 G/DL — SIGNIFICANT CHANGE UP (ref 6–8.3)
RBC # BLD: 3.8 M/UL — SIGNIFICANT CHANGE UP (ref 3.8–5.2)
RBC # FLD: 13.2 % — SIGNIFICANT CHANGE UP (ref 10.3–14.5)
SODIUM SERPL-SCNC: 136 MMOL/L — SIGNIFICANT CHANGE UP (ref 135–145)
WBC # BLD: 6.13 K/UL — SIGNIFICANT CHANGE UP (ref 3.8–10.5)
WBC # FLD AUTO: 6.13 K/UL — SIGNIFICANT CHANGE UP (ref 3.8–10.5)

## 2025-07-17 PROCEDURE — 99233 SBSQ HOSP IP/OBS HIGH 50: CPT | Mod: GC

## 2025-07-17 PROCEDURE — 99232 SBSQ HOSP IP/OBS MODERATE 35: CPT

## 2025-07-17 RX ADMIN — ATORVASTATIN CALCIUM 10 MILLIGRAM(S): 80 TABLET, FILM COATED ORAL at 21:37

## 2025-07-17 RX ADMIN — Medication 1 APPLICATION(S): at 05:41

## 2025-07-17 RX ADMIN — POLYETHYLENE GLYCOL 3350 17 GRAM(S): 17 POWDER, FOR SOLUTION ORAL at 17:25

## 2025-07-17 RX ADMIN — LOSARTAN POTASSIUM 100 MILLIGRAM(S): 100 TABLET, FILM COATED ORAL at 05:41

## 2025-07-17 RX ADMIN — Medication 1 APPLICATION(S): at 17:25

## 2025-07-17 RX ADMIN — Medication 2 TABLET(S): at 21:37

## 2025-07-17 RX ADMIN — Medication 40 MILLIGRAM(S): at 05:41

## 2025-07-17 RX ADMIN — Medication 3 MILLIGRAM(S): at 21:37

## 2025-07-17 RX ADMIN — Medication 2000 UNIT(S): at 17:25

## 2025-07-17 RX ADMIN — TAMSULOSIN HYDROCHLORIDE 0.4 MILLIGRAM(S): 0.4 CAPSULE ORAL at 21:37

## 2025-07-17 RX ADMIN — Medication 88 MICROGRAM(S): at 05:41

## 2025-07-17 RX ADMIN — CARVEDILOL 25 MILLIGRAM(S): 3.12 TABLET, FILM COATED ORAL at 17:25

## 2025-07-17 RX ADMIN — ENOXAPARIN SODIUM 30 MILLIGRAM(S): 100 INJECTION SUBCUTANEOUS at 17:25

## 2025-07-17 RX ADMIN — CARVEDILOL 25 MILLIGRAM(S): 3.12 TABLET, FILM COATED ORAL at 05:40

## 2025-07-18 PROCEDURE — 99233 SBSQ HOSP IP/OBS HIGH 50: CPT | Mod: GC

## 2025-07-18 PROCEDURE — 99232 SBSQ HOSP IP/OBS MODERATE 35: CPT

## 2025-07-18 RX ADMIN — Medication 1 APPLICATION(S): at 17:35

## 2025-07-18 RX ADMIN — Medication 2 TABLET(S): at 21:50

## 2025-07-18 RX ADMIN — POLYETHYLENE GLYCOL 3350 17 GRAM(S): 17 POWDER, FOR SOLUTION ORAL at 11:39

## 2025-07-18 RX ADMIN — Medication 3 MILLIGRAM(S): at 21:50

## 2025-07-18 RX ADMIN — CARVEDILOL 25 MILLIGRAM(S): 3.12 TABLET, FILM COATED ORAL at 06:11

## 2025-07-18 RX ADMIN — LOSARTAN POTASSIUM 100 MILLIGRAM(S): 100 TABLET, FILM COATED ORAL at 06:10

## 2025-07-18 RX ADMIN — ENOXAPARIN SODIUM 30 MILLIGRAM(S): 100 INJECTION SUBCUTANEOUS at 17:35

## 2025-07-18 RX ADMIN — ATORVASTATIN CALCIUM 10 MILLIGRAM(S): 80 TABLET, FILM COATED ORAL at 21:50

## 2025-07-18 RX ADMIN — Medication 1 APPLICATION(S): at 06:11

## 2025-07-18 RX ADMIN — TAMSULOSIN HYDROCHLORIDE 0.4 MILLIGRAM(S): 0.4 CAPSULE ORAL at 21:50

## 2025-07-18 RX ADMIN — Medication 40 MILLIGRAM(S): at 06:10

## 2025-07-18 RX ADMIN — CARVEDILOL 25 MILLIGRAM(S): 3.12 TABLET, FILM COATED ORAL at 17:35

## 2025-07-18 RX ADMIN — Medication 2000 UNIT(S): at 11:39

## 2025-07-18 RX ADMIN — Medication 88 MICROGRAM(S): at 06:11

## 2025-07-19 PROCEDURE — 99232 SBSQ HOSP IP/OBS MODERATE 35: CPT

## 2025-07-19 RX ADMIN — Medication 2 TABLET(S): at 21:20

## 2025-07-19 RX ADMIN — ATORVASTATIN CALCIUM 10 MILLIGRAM(S): 80 TABLET, FILM COATED ORAL at 21:20

## 2025-07-19 RX ADMIN — Medication 3 MILLIGRAM(S): at 21:20

## 2025-07-19 RX ADMIN — Medication 1 APPLICATION(S): at 05:02

## 2025-07-19 RX ADMIN — Medication 40 MILLIGRAM(S): at 06:07

## 2025-07-19 RX ADMIN — CARVEDILOL 25 MILLIGRAM(S): 3.12 TABLET, FILM COATED ORAL at 05:01

## 2025-07-19 RX ADMIN — Medication 2000 UNIT(S): at 11:33

## 2025-07-19 RX ADMIN — POLYETHYLENE GLYCOL 3350 17 GRAM(S): 17 POWDER, FOR SOLUTION ORAL at 11:33

## 2025-07-19 RX ADMIN — CARVEDILOL 25 MILLIGRAM(S): 3.12 TABLET, FILM COATED ORAL at 17:47

## 2025-07-19 RX ADMIN — LOSARTAN POTASSIUM 100 MILLIGRAM(S): 100 TABLET, FILM COATED ORAL at 05:01

## 2025-07-19 RX ADMIN — ENOXAPARIN SODIUM 30 MILLIGRAM(S): 100 INJECTION SUBCUTANEOUS at 17:47

## 2025-07-19 RX ADMIN — Medication 88 MICROGRAM(S): at 05:01

## 2025-07-19 RX ADMIN — TAMSULOSIN HYDROCHLORIDE 0.4 MILLIGRAM(S): 0.4 CAPSULE ORAL at 21:20

## 2025-07-20 PROCEDURE — 99232 SBSQ HOSP IP/OBS MODERATE 35: CPT

## 2025-07-20 PROCEDURE — 93970 EXTREMITY STUDY: CPT | Mod: 26

## 2025-07-20 RX ADMIN — ATORVASTATIN CALCIUM 10 MILLIGRAM(S): 80 TABLET, FILM COATED ORAL at 21:46

## 2025-07-20 RX ADMIN — Medication 2 TABLET(S): at 21:47

## 2025-07-20 RX ADMIN — CARVEDILOL 25 MILLIGRAM(S): 3.12 TABLET, FILM COATED ORAL at 05:03

## 2025-07-20 RX ADMIN — TAMSULOSIN HYDROCHLORIDE 0.4 MILLIGRAM(S): 0.4 CAPSULE ORAL at 21:46

## 2025-07-20 RX ADMIN — Medication 3 MILLIGRAM(S): at 21:46

## 2025-07-20 RX ADMIN — Medication 1 APPLICATION(S): at 05:02

## 2025-07-20 RX ADMIN — ENOXAPARIN SODIUM 30 MILLIGRAM(S): 100 INJECTION SUBCUTANEOUS at 18:09

## 2025-07-20 RX ADMIN — Medication 88 MICROGRAM(S): at 05:02

## 2025-07-20 RX ADMIN — CARVEDILOL 25 MILLIGRAM(S): 3.12 TABLET, FILM COATED ORAL at 18:09

## 2025-07-20 RX ADMIN — POLYETHYLENE GLYCOL 3350 17 GRAM(S): 17 POWDER, FOR SOLUTION ORAL at 12:34

## 2025-07-20 RX ADMIN — LOSARTAN POTASSIUM 100 MILLIGRAM(S): 100 TABLET, FILM COATED ORAL at 05:02

## 2025-07-20 RX ADMIN — Medication 2000 UNIT(S): at 12:34

## 2025-07-20 RX ADMIN — Medication 40 MILLIGRAM(S): at 06:20

## 2025-07-21 LAB
ALBUMIN SERPL ELPH-MCNC: 2.8 G/DL — LOW (ref 3.3–5)
ALP SERPL-CCNC: 63 U/L — SIGNIFICANT CHANGE UP (ref 40–120)
ALT FLD-CCNC: 29 U/L — SIGNIFICANT CHANGE UP (ref 10–45)
ANION GAP SERPL CALC-SCNC: 8 MMOL/L — SIGNIFICANT CHANGE UP (ref 5–17)
AST SERPL-CCNC: 26 U/L — SIGNIFICANT CHANGE UP (ref 10–40)
BILIRUB SERPL-MCNC: 0.3 MG/DL — SIGNIFICANT CHANGE UP (ref 0.2–1.2)
BUN SERPL-MCNC: 14 MG/DL — SIGNIFICANT CHANGE UP (ref 7–23)
CALCIUM SERPL-MCNC: 8.4 MG/DL — SIGNIFICANT CHANGE UP (ref 8.4–10.5)
CHLORIDE SERPL-SCNC: 106 MMOL/L — SIGNIFICANT CHANGE UP (ref 96–108)
CO2 SERPL-SCNC: 26 MMOL/L — SIGNIFICANT CHANGE UP (ref 22–31)
CREAT SERPL-MCNC: 0.55 MG/DL — SIGNIFICANT CHANGE UP (ref 0.5–1.3)
EGFR: 92 ML/MIN/1.73M2 — SIGNIFICANT CHANGE UP
EGFR: 92 ML/MIN/1.73M2 — SIGNIFICANT CHANGE UP
GLUCOSE SERPL-MCNC: 127 MG/DL — HIGH (ref 70–99)
HCT VFR BLD CALC: 33 % — LOW (ref 34.5–45)
HGB BLD-MCNC: 11 G/DL — LOW (ref 11.5–15.5)
MCHC RBC-ENTMCNC: 29.7 PG — SIGNIFICANT CHANGE UP (ref 27–34)
MCHC RBC-ENTMCNC: 33.3 G/DL — SIGNIFICANT CHANGE UP (ref 32–36)
MCV RBC AUTO: 89.2 FL — SIGNIFICANT CHANGE UP (ref 80–100)
NRBC BLD AUTO-RTO: 0 /100 WBCS — SIGNIFICANT CHANGE UP (ref 0–0)
PLATELET # BLD AUTO: 200 K/UL — SIGNIFICANT CHANGE UP (ref 150–400)
POTASSIUM SERPL-MCNC: 4 MMOL/L — SIGNIFICANT CHANGE UP (ref 3.5–5.3)
POTASSIUM SERPL-SCNC: 4 MMOL/L — SIGNIFICANT CHANGE UP (ref 3.5–5.3)
PROT SERPL-MCNC: 6.1 G/DL — SIGNIFICANT CHANGE UP (ref 6–8.3)
RBC # BLD: 3.7 M/UL — LOW (ref 3.8–5.2)
RBC # FLD: 13.6 % — SIGNIFICANT CHANGE UP (ref 10.3–14.5)
SODIUM SERPL-SCNC: 140 MMOL/L — SIGNIFICANT CHANGE UP (ref 135–145)
WBC # BLD: 6.16 K/UL — SIGNIFICANT CHANGE UP (ref 3.8–10.5)
WBC # FLD AUTO: 6.16 K/UL — SIGNIFICANT CHANGE UP (ref 3.8–10.5)

## 2025-07-21 PROCEDURE — 99232 SBSQ HOSP IP/OBS MODERATE 35: CPT | Mod: GC

## 2025-07-21 PROCEDURE — 99232 SBSQ HOSP IP/OBS MODERATE 35: CPT

## 2025-07-21 RX ORDER — MELATONIN 5 MG
6 TABLET ORAL
Refills: 0 | Status: DISCONTINUED | OUTPATIENT
Start: 2025-07-21 | End: 2025-07-25

## 2025-07-21 RX ORDER — MELATONIN 5 MG
6 TABLET ORAL
Refills: 0 | Status: DISCONTINUED | OUTPATIENT
Start: 2025-07-21 | End: 2025-07-21

## 2025-07-21 RX ORDER — LOSARTAN POTASSIUM 100 MG/1
100 TABLET, FILM COATED ORAL
Refills: 0 | Status: DISCONTINUED | OUTPATIENT
Start: 2025-07-22 | End: 2025-08-05

## 2025-07-21 RX ORDER — CARVEDILOL 3.12 MG/1
25 TABLET, FILM COATED ORAL
Refills: 0 | Status: DISCONTINUED | OUTPATIENT
Start: 2025-07-21 | End: 2025-08-05

## 2025-07-21 RX ADMIN — Medication 1 APPLICATION(S): at 05:21

## 2025-07-21 RX ADMIN — Medication 1 APPLICATION(S): at 17:20

## 2025-07-21 RX ADMIN — Medication 2000 UNIT(S): at 12:07

## 2025-07-21 RX ADMIN — POLYETHYLENE GLYCOL 3350 17 GRAM(S): 17 POWDER, FOR SOLUTION ORAL at 12:07

## 2025-07-21 RX ADMIN — Medication 88 MICROGRAM(S): at 05:21

## 2025-07-21 RX ADMIN — CARVEDILOL 25 MILLIGRAM(S): 3.12 TABLET, FILM COATED ORAL at 05:21

## 2025-07-21 RX ADMIN — ENOXAPARIN SODIUM 30 MILLIGRAM(S): 100 INJECTION SUBCUTANEOUS at 17:19

## 2025-07-21 RX ADMIN — Medication 40 MILLIGRAM(S): at 05:22

## 2025-07-21 RX ADMIN — CARVEDILOL 25 MILLIGRAM(S): 3.12 TABLET, FILM COATED ORAL at 20:47

## 2025-07-21 RX ADMIN — ATORVASTATIN CALCIUM 10 MILLIGRAM(S): 80 TABLET, FILM COATED ORAL at 21:34

## 2025-07-21 RX ADMIN — Medication 2 TABLET(S): at 21:34

## 2025-07-21 RX ADMIN — LOSARTAN POTASSIUM 100 MILLIGRAM(S): 100 TABLET, FILM COATED ORAL at 05:21

## 2025-07-21 RX ADMIN — TAMSULOSIN HYDROCHLORIDE 0.4 MILLIGRAM(S): 0.4 CAPSULE ORAL at 21:34

## 2025-07-21 RX ADMIN — Medication 6 MILLIGRAM(S): at 20:46

## 2025-07-22 PROCEDURE — 99232 SBSQ HOSP IP/OBS MODERATE 35: CPT

## 2025-07-22 PROCEDURE — 99233 SBSQ HOSP IP/OBS HIGH 50: CPT | Mod: GC

## 2025-07-22 RX ORDER — PHENYLEPHRINE HYDROCHLORIDE AND FAT, HARD .00525; 1.86 G/1; G/1
1 SUPPOSITORY RECTAL THREE TIMES A DAY
Refills: 0 | Status: DISCONTINUED | OUTPATIENT
Start: 2025-07-22 | End: 2025-08-05

## 2025-07-22 RX ADMIN — LOSARTAN POTASSIUM 100 MILLIGRAM(S): 100 TABLET, FILM COATED ORAL at 07:10

## 2025-07-22 RX ADMIN — TAMSULOSIN HYDROCHLORIDE 0.4 MILLIGRAM(S): 0.4 CAPSULE ORAL at 21:04

## 2025-07-22 RX ADMIN — Medication 88 MICROGRAM(S): at 05:19

## 2025-07-22 RX ADMIN — CARVEDILOL 25 MILLIGRAM(S): 3.12 TABLET, FILM COATED ORAL at 07:10

## 2025-07-22 RX ADMIN — Medication 40 MILLIGRAM(S): at 05:19

## 2025-07-22 RX ADMIN — ENOXAPARIN SODIUM 30 MILLIGRAM(S): 100 INJECTION SUBCUTANEOUS at 17:22

## 2025-07-22 RX ADMIN — Medication 1 APPLICATION(S): at 17:22

## 2025-07-22 RX ADMIN — Medication 6 MILLIGRAM(S): at 21:00

## 2025-07-22 RX ADMIN — Medication 1 APPLICATION(S): at 05:19

## 2025-07-22 RX ADMIN — Medication 2000 UNIT(S): at 11:13

## 2025-07-22 RX ADMIN — ATORVASTATIN CALCIUM 10 MILLIGRAM(S): 80 TABLET, FILM COATED ORAL at 21:04

## 2025-07-22 RX ADMIN — CARVEDILOL 25 MILLIGRAM(S): 3.12 TABLET, FILM COATED ORAL at 21:03

## 2025-07-22 RX ADMIN — POLYETHYLENE GLYCOL 3350 17 GRAM(S): 17 POWDER, FOR SOLUTION ORAL at 11:13

## 2025-07-23 LAB
HCT VFR BLD CALC: 30.7 % — LOW (ref 34.5–45)
HGB BLD-MCNC: 10.3 G/DL — LOW (ref 11.5–15.5)
MCHC RBC-ENTMCNC: 29.7 PG — SIGNIFICANT CHANGE UP (ref 27–34)
MCHC RBC-ENTMCNC: 33.6 G/DL — SIGNIFICANT CHANGE UP (ref 32–36)
MCV RBC AUTO: 88.5 FL — SIGNIFICANT CHANGE UP (ref 80–100)
NRBC BLD AUTO-RTO: 0 /100 WBCS — SIGNIFICANT CHANGE UP (ref 0–0)
PLATELET # BLD AUTO: 200 K/UL — SIGNIFICANT CHANGE UP (ref 150–400)
RBC # BLD: 3.47 M/UL — LOW (ref 3.8–5.2)
RBC # FLD: 13.7 % — SIGNIFICANT CHANGE UP (ref 10.3–14.5)
WBC # BLD: 5.51 K/UL — SIGNIFICANT CHANGE UP (ref 3.8–10.5)
WBC # FLD AUTO: 5.51 K/UL — SIGNIFICANT CHANGE UP (ref 3.8–10.5)

## 2025-07-23 PROCEDURE — 99233 SBSQ HOSP IP/OBS HIGH 50: CPT | Mod: GC

## 2025-07-23 PROCEDURE — 99232 SBSQ HOSP IP/OBS MODERATE 35: CPT

## 2025-07-23 RX ORDER — TAMSULOSIN HYDROCHLORIDE 0.4 MG/1
0.4 CAPSULE ORAL
Refills: 0 | Status: DISCONTINUED | OUTPATIENT
Start: 2025-07-23 | End: 2025-08-05

## 2025-07-23 RX ORDER — SENNA 187 MG
2 TABLET ORAL
Refills: 0 | Status: DISCONTINUED | OUTPATIENT
Start: 2025-07-23 | End: 2025-08-05

## 2025-07-23 RX ORDER — ATORVASTATIN CALCIUM 80 MG/1
10 TABLET, FILM COATED ORAL
Refills: 0 | Status: DISCONTINUED | OUTPATIENT
Start: 2025-07-23 | End: 2025-08-05

## 2025-07-23 RX ADMIN — ATORVASTATIN CALCIUM 10 MILLIGRAM(S): 80 TABLET, FILM COATED ORAL at 19:43

## 2025-07-23 RX ADMIN — CARVEDILOL 25 MILLIGRAM(S): 3.12 TABLET, FILM COATED ORAL at 19:43

## 2025-07-23 RX ADMIN — LOSARTAN POTASSIUM 100 MILLIGRAM(S): 100 TABLET, FILM COATED ORAL at 07:14

## 2025-07-23 RX ADMIN — TAMSULOSIN HYDROCHLORIDE 0.4 MILLIGRAM(S): 0.4 CAPSULE ORAL at 19:43

## 2025-07-23 RX ADMIN — Medication 6 MILLIGRAM(S): at 19:43

## 2025-07-23 RX ADMIN — Medication 2000 UNIT(S): at 11:24

## 2025-07-23 RX ADMIN — Medication 1 APPLICATION(S): at 05:23

## 2025-07-23 RX ADMIN — POLYETHYLENE GLYCOL 3350 17 GRAM(S): 17 POWDER, FOR SOLUTION ORAL at 11:24

## 2025-07-23 RX ADMIN — Medication 1 APPLICATION(S): at 17:28

## 2025-07-23 RX ADMIN — Medication 88 MICROGRAM(S): at 05:23

## 2025-07-23 RX ADMIN — ENOXAPARIN SODIUM 30 MILLIGRAM(S): 100 INJECTION SUBCUTANEOUS at 17:28

## 2025-07-23 RX ADMIN — CARVEDILOL 25 MILLIGRAM(S): 3.12 TABLET, FILM COATED ORAL at 07:15

## 2025-07-23 RX ADMIN — Medication 40 MILLIGRAM(S): at 05:23

## 2025-07-24 LAB
ALBUMIN SERPL ELPH-MCNC: 2.8 G/DL — LOW (ref 3.3–5)
ALP SERPL-CCNC: 64 U/L — SIGNIFICANT CHANGE UP (ref 40–120)
ALT FLD-CCNC: 27 U/L — SIGNIFICANT CHANGE UP (ref 10–45)
ANION GAP SERPL CALC-SCNC: 9 MMOL/L — SIGNIFICANT CHANGE UP (ref 5–17)
AST SERPL-CCNC: 24 U/L — SIGNIFICANT CHANGE UP (ref 10–40)
BILIRUB SERPL-MCNC: 0.3 MG/DL — SIGNIFICANT CHANGE UP (ref 0.2–1.2)
BUN SERPL-MCNC: 28 MG/DL — HIGH (ref 7–23)
CALCIUM SERPL-MCNC: 8.8 MG/DL — SIGNIFICANT CHANGE UP (ref 8.4–10.5)
CHLORIDE SERPL-SCNC: 105 MMOL/L — SIGNIFICANT CHANGE UP (ref 96–108)
CO2 SERPL-SCNC: 25 MMOL/L — SIGNIFICANT CHANGE UP (ref 22–31)
CREAT SERPL-MCNC: 1.1 MG/DL — SIGNIFICANT CHANGE UP (ref 0.5–1.3)
EGFR: 50 ML/MIN/1.73M2 — LOW
EGFR: 50 ML/MIN/1.73M2 — LOW
GLUCOSE SERPL-MCNC: 95 MG/DL — SIGNIFICANT CHANGE UP (ref 70–99)
HCT VFR BLD CALC: 32.2 % — LOW (ref 34.5–45)
HGB BLD-MCNC: 10.6 G/DL — LOW (ref 11.5–15.5)
MCHC RBC-ENTMCNC: 29.6 PG — SIGNIFICANT CHANGE UP (ref 27–34)
MCHC RBC-ENTMCNC: 32.9 G/DL — SIGNIFICANT CHANGE UP (ref 32–36)
MCV RBC AUTO: 89.9 FL — SIGNIFICANT CHANGE UP (ref 80–100)
NRBC BLD AUTO-RTO: 0 /100 WBCS — SIGNIFICANT CHANGE UP (ref 0–0)
PLATELET # BLD AUTO: 193 K/UL — SIGNIFICANT CHANGE UP (ref 150–400)
POTASSIUM SERPL-MCNC: 4.5 MMOL/L — SIGNIFICANT CHANGE UP (ref 3.5–5.3)
POTASSIUM SERPL-SCNC: 4.5 MMOL/L — SIGNIFICANT CHANGE UP (ref 3.5–5.3)
PROT SERPL-MCNC: 6.2 G/DL — SIGNIFICANT CHANGE UP (ref 6–8.3)
RBC # BLD: 3.58 M/UL — LOW (ref 3.8–5.2)
RBC # FLD: 13.8 % — SIGNIFICANT CHANGE UP (ref 10.3–14.5)
SODIUM SERPL-SCNC: 139 MMOL/L — SIGNIFICANT CHANGE UP (ref 135–145)
WBC # BLD: 6.11 K/UL — SIGNIFICANT CHANGE UP (ref 3.8–10.5)
WBC # FLD AUTO: 6.11 K/UL — SIGNIFICANT CHANGE UP (ref 3.8–10.5)

## 2025-07-24 PROCEDURE — 99233 SBSQ HOSP IP/OBS HIGH 50: CPT | Mod: GC

## 2025-07-24 PROCEDURE — 99232 SBSQ HOSP IP/OBS MODERATE 35: CPT

## 2025-07-24 RX ADMIN — ENOXAPARIN SODIUM 30 MILLIGRAM(S): 100 INJECTION SUBCUTANEOUS at 17:49

## 2025-07-24 RX ADMIN — CARVEDILOL 25 MILLIGRAM(S): 3.12 TABLET, FILM COATED ORAL at 07:33

## 2025-07-24 RX ADMIN — Medication 1 APPLICATION(S): at 05:46

## 2025-07-24 RX ADMIN — TAMSULOSIN HYDROCHLORIDE 0.4 MILLIGRAM(S): 0.4 CAPSULE ORAL at 20:08

## 2025-07-24 RX ADMIN — Medication 6 MILLIGRAM(S): at 20:05

## 2025-07-24 RX ADMIN — ATORVASTATIN CALCIUM 10 MILLIGRAM(S): 80 TABLET, FILM COATED ORAL at 20:05

## 2025-07-24 RX ADMIN — POLYETHYLENE GLYCOL 3350 17 GRAM(S): 17 POWDER, FOR SOLUTION ORAL at 12:11

## 2025-07-24 RX ADMIN — Medication 2 TABLET(S): at 20:06

## 2025-07-24 RX ADMIN — Medication 88 MICROGRAM(S): at 05:44

## 2025-07-24 RX ADMIN — Medication 2000 UNIT(S): at 12:11

## 2025-07-24 RX ADMIN — LOSARTAN POTASSIUM 100 MILLIGRAM(S): 100 TABLET, FILM COATED ORAL at 07:33

## 2025-07-24 RX ADMIN — Medication 40 MILLIGRAM(S): at 05:44

## 2025-07-25 PROCEDURE — 99232 SBSQ HOSP IP/OBS MODERATE 35: CPT

## 2025-07-25 PROCEDURE — 99232 SBSQ HOSP IP/OBS MODERATE 35: CPT | Mod: GC

## 2025-07-25 RX ORDER — RAMELTEON 8 MG/1
8 TABLET, FILM COATED ORAL
Refills: 0 | Status: DISCONTINUED | OUTPATIENT
Start: 2025-07-25 | End: 2025-08-05

## 2025-07-25 RX ORDER — BISACODYL 5 MG
10 TABLET, DELAYED RELEASE (ENTERIC COATED) ORAL ONCE
Refills: 0 | Status: COMPLETED | OUTPATIENT
Start: 2025-07-25 | End: 2025-07-25

## 2025-07-25 RX ADMIN — ENOXAPARIN SODIUM 30 MILLIGRAM(S): 100 INJECTION SUBCUTANEOUS at 17:14

## 2025-07-25 RX ADMIN — PHENYLEPHRINE HYDROCHLORIDE AND FAT, HARD 1 APPLICATION(S): .00525; 1.86 SUPPOSITORY RECTAL at 13:12

## 2025-07-25 RX ADMIN — Medication 88 MICROGRAM(S): at 05:55

## 2025-07-25 RX ADMIN — Medication 40 MILLIGRAM(S): at 05:54

## 2025-07-25 RX ADMIN — POLYETHYLENE GLYCOL 3350 17 GRAM(S): 17 POWDER, FOR SOLUTION ORAL at 12:09

## 2025-07-25 RX ADMIN — Medication 2 TABLET(S): at 20:35

## 2025-07-25 RX ADMIN — CALCIUM CARBONATE 1 TABLET(S): 750 TABLET ORAL at 13:11

## 2025-07-25 RX ADMIN — LOSARTAN POTASSIUM 100 MILLIGRAM(S): 100 TABLET, FILM COATED ORAL at 10:45

## 2025-07-25 RX ADMIN — Medication 10 MILLIGRAM(S): at 13:21

## 2025-07-25 RX ADMIN — Medication 1 APPLICATION(S): at 05:56

## 2025-07-25 RX ADMIN — ATORVASTATIN CALCIUM 10 MILLIGRAM(S): 80 TABLET, FILM COATED ORAL at 20:35

## 2025-07-25 RX ADMIN — CARVEDILOL 25 MILLIGRAM(S): 3.12 TABLET, FILM COATED ORAL at 10:45

## 2025-07-25 RX ADMIN — CARVEDILOL 25 MILLIGRAM(S): 3.12 TABLET, FILM COATED ORAL at 20:35

## 2025-07-25 RX ADMIN — TAMSULOSIN HYDROCHLORIDE 0.4 MILLIGRAM(S): 0.4 CAPSULE ORAL at 20:35

## 2025-07-25 RX ADMIN — RAMELTEON 8 MILLIGRAM(S): 8 TABLET, FILM COATED ORAL at 20:35

## 2025-07-25 RX ADMIN — Medication 2000 UNIT(S): at 12:09

## 2025-07-26 PROCEDURE — 99232 SBSQ HOSP IP/OBS MODERATE 35: CPT

## 2025-07-26 RX ADMIN — RAMELTEON 8 MILLIGRAM(S): 8 TABLET, FILM COATED ORAL at 20:20

## 2025-07-26 RX ADMIN — ATORVASTATIN CALCIUM 10 MILLIGRAM(S): 80 TABLET, FILM COATED ORAL at 20:20

## 2025-07-26 RX ADMIN — CARVEDILOL 25 MILLIGRAM(S): 3.12 TABLET, FILM COATED ORAL at 07:52

## 2025-07-26 RX ADMIN — TAMSULOSIN HYDROCHLORIDE 0.4 MILLIGRAM(S): 0.4 CAPSULE ORAL at 20:20

## 2025-07-26 RX ADMIN — Medication 2000 UNIT(S): at 11:25

## 2025-07-26 RX ADMIN — POLYETHYLENE GLYCOL 3350 17 GRAM(S): 17 POWDER, FOR SOLUTION ORAL at 11:25

## 2025-07-26 RX ADMIN — ENOXAPARIN SODIUM 30 MILLIGRAM(S): 100 INJECTION SUBCUTANEOUS at 17:28

## 2025-07-26 RX ADMIN — Medication 40 MILLIGRAM(S): at 05:53

## 2025-07-26 RX ADMIN — Medication 88 MICROGRAM(S): at 05:53

## 2025-07-26 RX ADMIN — LOSARTAN POTASSIUM 100 MILLIGRAM(S): 100 TABLET, FILM COATED ORAL at 07:52

## 2025-07-27 PROCEDURE — 99232 SBSQ HOSP IP/OBS MODERATE 35: CPT

## 2025-07-27 RX ADMIN — CARVEDILOL 25 MILLIGRAM(S): 3.12 TABLET, FILM COATED ORAL at 07:49

## 2025-07-27 RX ADMIN — POLYETHYLENE GLYCOL 3350 17 GRAM(S): 17 POWDER, FOR SOLUTION ORAL at 11:40

## 2025-07-27 RX ADMIN — Medication 2000 UNIT(S): at 11:40

## 2025-07-27 RX ADMIN — CARVEDILOL 25 MILLIGRAM(S): 3.12 TABLET, FILM COATED ORAL at 20:19

## 2025-07-27 RX ADMIN — ENOXAPARIN SODIUM 30 MILLIGRAM(S): 100 INJECTION SUBCUTANEOUS at 17:31

## 2025-07-27 RX ADMIN — ATORVASTATIN CALCIUM 10 MILLIGRAM(S): 80 TABLET, FILM COATED ORAL at 20:19

## 2025-07-27 RX ADMIN — Medication 2 TABLET(S): at 20:19

## 2025-07-27 RX ADMIN — Medication 1 APPLICATION(S): at 05:44

## 2025-07-27 RX ADMIN — Medication 40 MILLIGRAM(S): at 05:44

## 2025-07-27 RX ADMIN — RAMELTEON 8 MILLIGRAM(S): 8 TABLET, FILM COATED ORAL at 20:19

## 2025-07-27 RX ADMIN — TAMSULOSIN HYDROCHLORIDE 0.4 MILLIGRAM(S): 0.4 CAPSULE ORAL at 20:19

## 2025-07-27 RX ADMIN — Medication 88 MICROGRAM(S): at 05:44

## 2025-07-27 RX ADMIN — LOSARTAN POTASSIUM 100 MILLIGRAM(S): 100 TABLET, FILM COATED ORAL at 07:49

## 2025-07-28 ENCOUNTER — APPOINTMENT (OUTPATIENT)
Dept: CARDIOLOGY | Facility: CLINIC | Age: 85
End: 2025-07-28

## 2025-07-28 LAB
ALBUMIN SERPL ELPH-MCNC: 2.8 G/DL — LOW (ref 3.3–5)
ALP SERPL-CCNC: 62 U/L — SIGNIFICANT CHANGE UP (ref 40–120)
ALT FLD-CCNC: 14 U/L — SIGNIFICANT CHANGE UP (ref 10–45)
ANION GAP SERPL CALC-SCNC: 6 MMOL/L — SIGNIFICANT CHANGE UP (ref 5–17)
AST SERPL-CCNC: 10 U/L — SIGNIFICANT CHANGE UP (ref 10–40)
BILIRUB SERPL-MCNC: 0.3 MG/DL — SIGNIFICANT CHANGE UP (ref 0.2–1.2)
BUN SERPL-MCNC: 15 MG/DL — SIGNIFICANT CHANGE UP (ref 7–23)
CALCIUM SERPL-MCNC: 8.7 MG/DL — SIGNIFICANT CHANGE UP (ref 8.4–10.5)
CHLORIDE SERPL-SCNC: 105 MMOL/L — SIGNIFICANT CHANGE UP (ref 96–108)
CO2 SERPL-SCNC: 29 MMOL/L — SIGNIFICANT CHANGE UP (ref 22–31)
CREAT SERPL-MCNC: 0.81 MG/DL — SIGNIFICANT CHANGE UP (ref 0.5–1.3)
EGFR: 72 ML/MIN/1.73M2 — SIGNIFICANT CHANGE UP
EGFR: 72 ML/MIN/1.73M2 — SIGNIFICANT CHANGE UP
GLUCOSE SERPL-MCNC: 91 MG/DL — SIGNIFICANT CHANGE UP (ref 70–99)
HCT VFR BLD CALC: 30.8 % — LOW (ref 34.5–45)
HGB BLD-MCNC: 10.1 G/DL — LOW (ref 11.5–15.5)
MCHC RBC-ENTMCNC: 29.6 PG — SIGNIFICANT CHANGE UP (ref 27–34)
MCHC RBC-ENTMCNC: 32.8 G/DL — SIGNIFICANT CHANGE UP (ref 32–36)
MCV RBC AUTO: 90.3 FL — SIGNIFICANT CHANGE UP (ref 80–100)
NRBC BLD AUTO-RTO: 0 /100 WBCS — SIGNIFICANT CHANGE UP (ref 0–0)
PLATELET # BLD AUTO: 158 K/UL — SIGNIFICANT CHANGE UP (ref 150–400)
POTASSIUM SERPL-MCNC: 4 MMOL/L — SIGNIFICANT CHANGE UP (ref 3.5–5.3)
POTASSIUM SERPL-SCNC: 4 MMOL/L — SIGNIFICANT CHANGE UP (ref 3.5–5.3)
PROT SERPL-MCNC: 5.9 G/DL — LOW (ref 6–8.3)
RBC # BLD: 3.41 M/UL — LOW (ref 3.8–5.2)
RBC # FLD: 14.1 % — SIGNIFICANT CHANGE UP (ref 10.3–14.5)
SODIUM SERPL-SCNC: 140 MMOL/L — SIGNIFICANT CHANGE UP (ref 135–145)
WBC # BLD: 4.84 K/UL — SIGNIFICANT CHANGE UP (ref 3.8–10.5)
WBC # FLD AUTO: 4.84 K/UL — SIGNIFICANT CHANGE UP (ref 3.8–10.5)

## 2025-07-28 PROCEDURE — 99232 SBSQ HOSP IP/OBS MODERATE 35: CPT

## 2025-07-28 PROCEDURE — 99232 SBSQ HOSP IP/OBS MODERATE 35: CPT | Mod: GC

## 2025-07-28 RX ADMIN — Medication 88 MICROGRAM(S): at 05:22

## 2025-07-28 RX ADMIN — Medication 2000 UNIT(S): at 12:11

## 2025-07-28 RX ADMIN — CARVEDILOL 25 MILLIGRAM(S): 3.12 TABLET, FILM COATED ORAL at 20:53

## 2025-07-28 RX ADMIN — CARVEDILOL 25 MILLIGRAM(S): 3.12 TABLET, FILM COATED ORAL at 08:26

## 2025-07-28 RX ADMIN — ATORVASTATIN CALCIUM 10 MILLIGRAM(S): 80 TABLET, FILM COATED ORAL at 20:53

## 2025-07-28 RX ADMIN — ENOXAPARIN SODIUM 30 MILLIGRAM(S): 100 INJECTION SUBCUTANEOUS at 18:16

## 2025-07-28 RX ADMIN — POLYETHYLENE GLYCOL 3350 17 GRAM(S): 17 POWDER, FOR SOLUTION ORAL at 12:11

## 2025-07-28 RX ADMIN — Medication 2 TABLET(S): at 20:53

## 2025-07-28 RX ADMIN — Medication 1 APPLICATION(S): at 18:18

## 2025-07-28 RX ADMIN — Medication 40 MILLIGRAM(S): at 05:22

## 2025-07-28 RX ADMIN — LOSARTAN POTASSIUM 100 MILLIGRAM(S): 100 TABLET, FILM COATED ORAL at 08:26

## 2025-07-28 RX ADMIN — RAMELTEON 8 MILLIGRAM(S): 8 TABLET, FILM COATED ORAL at 20:53

## 2025-07-28 RX ADMIN — TAMSULOSIN HYDROCHLORIDE 0.4 MILLIGRAM(S): 0.4 CAPSULE ORAL at 20:53

## 2025-07-29 PROCEDURE — 99232 SBSQ HOSP IP/OBS MODERATE 35: CPT | Mod: GC

## 2025-07-29 PROCEDURE — 99232 SBSQ HOSP IP/OBS MODERATE 35: CPT

## 2025-07-29 RX ADMIN — ATORVASTATIN CALCIUM 10 MILLIGRAM(S): 80 TABLET, FILM COATED ORAL at 20:35

## 2025-07-29 RX ADMIN — ENOXAPARIN SODIUM 30 MILLIGRAM(S): 100 INJECTION SUBCUTANEOUS at 17:23

## 2025-07-29 RX ADMIN — POLYETHYLENE GLYCOL 3350 17 GRAM(S): 17 POWDER, FOR SOLUTION ORAL at 11:15

## 2025-07-29 RX ADMIN — TAMSULOSIN HYDROCHLORIDE 0.4 MILLIGRAM(S): 0.4 CAPSULE ORAL at 20:35

## 2025-07-29 RX ADMIN — Medication 2000 UNIT(S): at 11:15

## 2025-07-29 RX ADMIN — Medication 1 APPLICATION(S): at 05:35

## 2025-07-29 RX ADMIN — Medication 40 MILLIGRAM(S): at 05:33

## 2025-07-29 RX ADMIN — Medication 1 APPLICATION(S): at 17:23

## 2025-07-29 RX ADMIN — RAMELTEON 8 MILLIGRAM(S): 8 TABLET, FILM COATED ORAL at 20:35

## 2025-07-29 RX ADMIN — LOSARTAN POTASSIUM 100 MILLIGRAM(S): 100 TABLET, FILM COATED ORAL at 07:39

## 2025-07-29 RX ADMIN — CARVEDILOL 25 MILLIGRAM(S): 3.12 TABLET, FILM COATED ORAL at 07:38

## 2025-07-29 RX ADMIN — Medication 88 MICROGRAM(S): at 05:32

## 2025-07-29 RX ADMIN — Medication 2 TABLET(S): at 20:35

## 2025-07-30 ENCOUNTER — TRANSCRIPTION ENCOUNTER (OUTPATIENT)
Age: 85
End: 2025-07-30

## 2025-07-30 PROCEDURE — 99233 SBSQ HOSP IP/OBS HIGH 50: CPT | Mod: GC

## 2025-07-30 PROCEDURE — 99232 SBSQ HOSP IP/OBS MODERATE 35: CPT

## 2025-07-30 RX ADMIN — Medication 88 MICROGRAM(S): at 05:23

## 2025-07-30 RX ADMIN — CARVEDILOL 25 MILLIGRAM(S): 3.12 TABLET, FILM COATED ORAL at 07:35

## 2025-07-30 RX ADMIN — Medication 2 TABLET(S): at 20:53

## 2025-07-30 RX ADMIN — LOSARTAN POTASSIUM 100 MILLIGRAM(S): 100 TABLET, FILM COATED ORAL at 07:35

## 2025-07-30 RX ADMIN — CARVEDILOL 25 MILLIGRAM(S): 3.12 TABLET, FILM COATED ORAL at 20:52

## 2025-07-30 RX ADMIN — Medication 1 APPLICATION(S): at 17:19

## 2025-07-30 RX ADMIN — Medication 2000 UNIT(S): at 11:49

## 2025-07-30 RX ADMIN — RAMELTEON 8 MILLIGRAM(S): 8 TABLET, FILM COATED ORAL at 20:52

## 2025-07-30 RX ADMIN — POLYETHYLENE GLYCOL 3350 17 GRAM(S): 17 POWDER, FOR SOLUTION ORAL at 11:49

## 2025-07-30 RX ADMIN — ATORVASTATIN CALCIUM 10 MILLIGRAM(S): 80 TABLET, FILM COATED ORAL at 20:53

## 2025-07-30 RX ADMIN — Medication 40 MILLIGRAM(S): at 06:08

## 2025-07-30 RX ADMIN — TAMSULOSIN HYDROCHLORIDE 0.4 MILLIGRAM(S): 0.4 CAPSULE ORAL at 20:53

## 2025-07-30 RX ADMIN — ENOXAPARIN SODIUM 30 MILLIGRAM(S): 100 INJECTION SUBCUTANEOUS at 17:19

## 2025-07-31 LAB
ALBUMIN SERPL ELPH-MCNC: 2.8 G/DL — LOW (ref 3.3–5)
ALP SERPL-CCNC: 57 U/L — SIGNIFICANT CHANGE UP (ref 40–120)
ALT FLD-CCNC: 22 U/L — SIGNIFICANT CHANGE UP (ref 10–45)
ANION GAP SERPL CALC-SCNC: 8 MMOL/L — SIGNIFICANT CHANGE UP (ref 5–17)
AST SERPL-CCNC: 16 U/L — SIGNIFICANT CHANGE UP (ref 10–40)
BILIRUB SERPL-MCNC: 0.4 MG/DL — SIGNIFICANT CHANGE UP (ref 0.2–1.2)
BUN SERPL-MCNC: 14 MG/DL — SIGNIFICANT CHANGE UP (ref 7–23)
CALCIUM SERPL-MCNC: 8.7 MG/DL — SIGNIFICANT CHANGE UP (ref 8.4–10.5)
CHLORIDE SERPL-SCNC: 104 MMOL/L — SIGNIFICANT CHANGE UP (ref 96–108)
CO2 SERPL-SCNC: 27 MMOL/L — SIGNIFICANT CHANGE UP (ref 22–31)
CREAT SERPL-MCNC: 0.68 MG/DL — SIGNIFICANT CHANGE UP (ref 0.5–1.3)
EGFR: 87 ML/MIN/1.73M2 — SIGNIFICANT CHANGE UP
EGFR: 87 ML/MIN/1.73M2 — SIGNIFICANT CHANGE UP
GLUCOSE SERPL-MCNC: 88 MG/DL — SIGNIFICANT CHANGE UP (ref 70–99)
HCT VFR BLD CALC: 30.3 % — LOW (ref 34.5–45)
HGB BLD-MCNC: 10.2 G/DL — LOW (ref 11.5–15.5)
MCHC RBC-ENTMCNC: 30 PG — SIGNIFICANT CHANGE UP (ref 27–34)
MCHC RBC-ENTMCNC: 33.7 G/DL — SIGNIFICANT CHANGE UP (ref 32–36)
MCV RBC AUTO: 89.1 FL — SIGNIFICANT CHANGE UP (ref 80–100)
NRBC BLD AUTO-RTO: 0 /100 WBCS — SIGNIFICANT CHANGE UP (ref 0–0)
PLATELET # BLD AUTO: 135 K/UL — LOW (ref 150–400)
POTASSIUM SERPL-MCNC: 3.8 MMOL/L — SIGNIFICANT CHANGE UP (ref 3.5–5.3)
POTASSIUM SERPL-SCNC: 3.8 MMOL/L — SIGNIFICANT CHANGE UP (ref 3.5–5.3)
PROT SERPL-MCNC: 6.1 G/DL — SIGNIFICANT CHANGE UP (ref 6–8.3)
RBC # BLD: 3.4 M/UL — LOW (ref 3.8–5.2)
RBC # FLD: 14.2 % — SIGNIFICANT CHANGE UP (ref 10.3–14.5)
SODIUM SERPL-SCNC: 139 MMOL/L — SIGNIFICANT CHANGE UP (ref 135–145)
WBC # BLD: 5.26 K/UL — SIGNIFICANT CHANGE UP (ref 3.8–10.5)
WBC # FLD AUTO: 5.26 K/UL — SIGNIFICANT CHANGE UP (ref 3.8–10.5)

## 2025-07-31 PROCEDURE — 99232 SBSQ HOSP IP/OBS MODERATE 35: CPT

## 2025-07-31 PROCEDURE — 99232 SBSQ HOSP IP/OBS MODERATE 35: CPT | Mod: GC

## 2025-07-31 RX ADMIN — Medication 2 TABLET(S): at 20:23

## 2025-07-31 RX ADMIN — CARVEDILOL 25 MILLIGRAM(S): 3.12 TABLET, FILM COATED ORAL at 08:00

## 2025-07-31 RX ADMIN — LOSARTAN POTASSIUM 100 MILLIGRAM(S): 100 TABLET, FILM COATED ORAL at 08:00

## 2025-07-31 RX ADMIN — TAMSULOSIN HYDROCHLORIDE 0.4 MILLIGRAM(S): 0.4 CAPSULE ORAL at 20:23

## 2025-07-31 RX ADMIN — RAMELTEON 8 MILLIGRAM(S): 8 TABLET, FILM COATED ORAL at 20:23

## 2025-07-31 RX ADMIN — ENOXAPARIN SODIUM 30 MILLIGRAM(S): 100 INJECTION SUBCUTANEOUS at 17:02

## 2025-07-31 RX ADMIN — Medication 1 APPLICATION(S): at 17:02

## 2025-07-31 RX ADMIN — POLYETHYLENE GLYCOL 3350 17 GRAM(S): 17 POWDER, FOR SOLUTION ORAL at 17:02

## 2025-07-31 RX ADMIN — Medication 2000 UNIT(S): at 17:02

## 2025-07-31 RX ADMIN — Medication 88 MICROGRAM(S): at 06:03

## 2025-07-31 RX ADMIN — ATORVASTATIN CALCIUM 10 MILLIGRAM(S): 80 TABLET, FILM COATED ORAL at 20:23

## 2025-07-31 RX ADMIN — CARVEDILOL 25 MILLIGRAM(S): 3.12 TABLET, FILM COATED ORAL at 20:23

## 2025-07-31 RX ADMIN — Medication 40 MILLIGRAM(S): at 06:02

## 2025-08-01 PROCEDURE — 99232 SBSQ HOSP IP/OBS MODERATE 35: CPT | Mod: GC

## 2025-08-01 RX ORDER — SENNA 187 MG
2 TABLET ORAL
Qty: 0 | Refills: 0 | DISCHARGE
Start: 2025-08-01

## 2025-08-01 RX ORDER — ATORVASTATIN CALCIUM 80 MG/1
1 TABLET, FILM COATED ORAL
Qty: 30 | Refills: 0
Start: 2025-08-01 | End: 2025-08-30

## 2025-08-01 RX ORDER — LOSARTAN POTASSIUM 100 MG/1
1 TABLET, FILM COATED ORAL
Qty: 30 | Refills: 0
Start: 2025-08-01 | End: 2025-08-30

## 2025-08-01 RX ORDER — FUROSEMIDE 10 MG/ML
20 INJECTION INTRAMUSCULAR; INTRAVENOUS
Refills: 0 | DISCHARGE

## 2025-08-01 RX ORDER — METOPROLOL SUCCINATE 50 MG/1
1 TABLET, EXTENDED RELEASE ORAL
Refills: 0 | DISCHARGE

## 2025-08-01 RX ORDER — LEVOTHYROXINE SODIUM 300 MCG
1 TABLET ORAL
Qty: 30 | Refills: 0
Start: 2025-08-01 | End: 2025-08-30

## 2025-08-01 RX ORDER — CARVEDILOL 3.12 MG/1
1 TABLET, FILM COATED ORAL
Qty: 30 | Refills: 0
Start: 2025-08-01 | End: 2025-08-30

## 2025-08-01 RX ORDER — CALCIUM CARBONATE 750 MG/1
1 TABLET ORAL
Refills: 0 | DISCHARGE

## 2025-08-01 RX ORDER — LEVOTHYROXINE SODIUM 300 MCG
1 TABLET ORAL
Refills: 0 | DISCHARGE

## 2025-08-01 RX ORDER — LANOLIN/MINERAL OIL/PETROLATUM
1 OINTMENT (GRAM) OPHTHALMIC (EYE)
Qty: 0 | Refills: 0 | DISCHARGE
Start: 2025-08-01

## 2025-08-01 RX ORDER — ACETAMINOPHEN 500 MG/5ML
2 LIQUID (ML) ORAL
Qty: 0 | Refills: 0 | DISCHARGE
Start: 2025-08-01

## 2025-08-01 RX ORDER — TAMSULOSIN HYDROCHLORIDE 0.4 MG/1
1 CAPSULE ORAL
Qty: 30 | Refills: 0
Start: 2025-08-01 | End: 2025-08-30

## 2025-08-01 RX ORDER — POLYETHYLENE GLYCOL 3350 17 G/17G
17 POWDER, FOR SOLUTION ORAL
Qty: 0 | Refills: 0 | DISCHARGE
Start: 2025-08-01

## 2025-08-01 RX ORDER — CANDESARTAN CILEXETIL 8 MG/1
1 TABLET ORAL
Refills: 0 | DISCHARGE

## 2025-08-01 RX ADMIN — CARVEDILOL 25 MILLIGRAM(S): 3.12 TABLET, FILM COATED ORAL at 07:10

## 2025-08-01 RX ADMIN — ENOXAPARIN SODIUM 30 MILLIGRAM(S): 100 INJECTION SUBCUTANEOUS at 17:02

## 2025-08-01 RX ADMIN — Medication 2 TABLET(S): at 19:56

## 2025-08-01 RX ADMIN — CARVEDILOL 25 MILLIGRAM(S): 3.12 TABLET, FILM COATED ORAL at 19:56

## 2025-08-01 RX ADMIN — ATORVASTATIN CALCIUM 10 MILLIGRAM(S): 80 TABLET, FILM COATED ORAL at 19:56

## 2025-08-01 RX ADMIN — Medication 88 MICROGRAM(S): at 06:02

## 2025-08-01 RX ADMIN — POLYETHYLENE GLYCOL 3350 17 GRAM(S): 17 POWDER, FOR SOLUTION ORAL at 11:07

## 2025-08-01 RX ADMIN — LOSARTAN POTASSIUM 100 MILLIGRAM(S): 100 TABLET, FILM COATED ORAL at 07:10

## 2025-08-01 RX ADMIN — Medication 2000 UNIT(S): at 11:06

## 2025-08-01 RX ADMIN — RAMELTEON 8 MILLIGRAM(S): 8 TABLET, FILM COATED ORAL at 19:56

## 2025-08-01 RX ADMIN — TAMSULOSIN HYDROCHLORIDE 0.4 MILLIGRAM(S): 0.4 CAPSULE ORAL at 19:56

## 2025-08-01 RX ADMIN — Medication 40 MILLIGRAM(S): at 06:02

## 2025-08-02 PROCEDURE — 99232 SBSQ HOSP IP/OBS MODERATE 35: CPT

## 2025-08-02 RX ADMIN — CARVEDILOL 25 MILLIGRAM(S): 3.12 TABLET, FILM COATED ORAL at 07:28

## 2025-08-02 RX ADMIN — LOSARTAN POTASSIUM 100 MILLIGRAM(S): 100 TABLET, FILM COATED ORAL at 07:28

## 2025-08-02 RX ADMIN — ENOXAPARIN SODIUM 30 MILLIGRAM(S): 100 INJECTION SUBCUTANEOUS at 17:11

## 2025-08-02 RX ADMIN — ATORVASTATIN CALCIUM 10 MILLIGRAM(S): 80 TABLET, FILM COATED ORAL at 20:08

## 2025-08-02 RX ADMIN — Medication 40 MILLIGRAM(S): at 05:36

## 2025-08-02 RX ADMIN — RAMELTEON 8 MILLIGRAM(S): 8 TABLET, FILM COATED ORAL at 19:58

## 2025-08-02 RX ADMIN — Medication 2000 UNIT(S): at 17:10

## 2025-08-02 RX ADMIN — Medication 2 TABLET(S): at 19:58

## 2025-08-02 RX ADMIN — CARVEDILOL 25 MILLIGRAM(S): 3.12 TABLET, FILM COATED ORAL at 19:58

## 2025-08-02 RX ADMIN — Medication 88 MICROGRAM(S): at 05:36

## 2025-08-02 RX ADMIN — TAMSULOSIN HYDROCHLORIDE 0.4 MILLIGRAM(S): 0.4 CAPSULE ORAL at 19:59

## 2025-08-03 PROCEDURE — 99232 SBSQ HOSP IP/OBS MODERATE 35: CPT

## 2025-08-03 RX ADMIN — Medication 40 MILLIGRAM(S): at 06:13

## 2025-08-03 RX ADMIN — ATORVASTATIN CALCIUM 10 MILLIGRAM(S): 80 TABLET, FILM COATED ORAL at 20:17

## 2025-08-03 RX ADMIN — LOSARTAN POTASSIUM 100 MILLIGRAM(S): 100 TABLET, FILM COATED ORAL at 08:50

## 2025-08-03 RX ADMIN — Medication 2000 UNIT(S): at 12:03

## 2025-08-03 RX ADMIN — TAMSULOSIN HYDROCHLORIDE 0.4 MILLIGRAM(S): 0.4 CAPSULE ORAL at 20:18

## 2025-08-03 RX ADMIN — Medication 2 TABLET(S): at 20:17

## 2025-08-03 RX ADMIN — ENOXAPARIN SODIUM 30 MILLIGRAM(S): 100 INJECTION SUBCUTANEOUS at 17:34

## 2025-08-03 RX ADMIN — CARVEDILOL 25 MILLIGRAM(S): 3.12 TABLET, FILM COATED ORAL at 08:51

## 2025-08-03 RX ADMIN — CARVEDILOL 25 MILLIGRAM(S): 3.12 TABLET, FILM COATED ORAL at 20:17

## 2025-08-03 RX ADMIN — Medication 88 MICROGRAM(S): at 06:13

## 2025-08-03 RX ADMIN — RAMELTEON 8 MILLIGRAM(S): 8 TABLET, FILM COATED ORAL at 20:17

## 2025-08-04 LAB
ALBUMIN SERPL ELPH-MCNC: 2.6 G/DL — LOW (ref 3.3–5)
ALP SERPL-CCNC: 56 U/L — SIGNIFICANT CHANGE UP (ref 40–120)
ALT FLD-CCNC: 24 U/L — SIGNIFICANT CHANGE UP (ref 10–45)
ANION GAP SERPL CALC-SCNC: 10 MMOL/L — SIGNIFICANT CHANGE UP (ref 5–17)
AST SERPL-CCNC: 20 U/L — SIGNIFICANT CHANGE UP (ref 10–40)
BILIRUB SERPL-MCNC: 0.6 MG/DL — SIGNIFICANT CHANGE UP (ref 0.2–1.2)
BUN SERPL-MCNC: 15 MG/DL — SIGNIFICANT CHANGE UP (ref 7–23)
CALCIUM SERPL-MCNC: 8.9 MG/DL — SIGNIFICANT CHANGE UP (ref 8.4–10.5)
CHLORIDE SERPL-SCNC: 102 MMOL/L — SIGNIFICANT CHANGE UP (ref 96–108)
CO2 SERPL-SCNC: 25 MMOL/L — SIGNIFICANT CHANGE UP (ref 22–31)
CREAT SERPL-MCNC: 0.94 MG/DL — SIGNIFICANT CHANGE UP (ref 0.5–1.3)
EGFR: 61 ML/MIN/1.73M2 — SIGNIFICANT CHANGE UP
EGFR: 61 ML/MIN/1.73M2 — SIGNIFICANT CHANGE UP
GLUCOSE SERPL-MCNC: 91 MG/DL — SIGNIFICANT CHANGE UP (ref 70–99)
HCT VFR BLD CALC: 31 % — LOW (ref 34.5–45)
HGB BLD-MCNC: 10.4 G/DL — LOW (ref 11.5–15.5)
MCHC RBC-ENTMCNC: 29.8 PG — SIGNIFICANT CHANGE UP (ref 27–34)
MCHC RBC-ENTMCNC: 33.5 G/DL — SIGNIFICANT CHANGE UP (ref 32–36)
MCV RBC AUTO: 88.8 FL — SIGNIFICANT CHANGE UP (ref 80–100)
NRBC BLD AUTO-RTO: 0 /100 WBCS — SIGNIFICANT CHANGE UP (ref 0–0)
PLATELET # BLD AUTO: 143 K/UL — LOW (ref 150–400)
POTASSIUM SERPL-MCNC: 4.2 MMOL/L — SIGNIFICANT CHANGE UP (ref 3.5–5.3)
POTASSIUM SERPL-SCNC: 4.2 MMOL/L — SIGNIFICANT CHANGE UP (ref 3.5–5.3)
PROT SERPL-MCNC: 6.5 G/DL — SIGNIFICANT CHANGE UP (ref 6–8.3)
RBC # BLD: 3.49 M/UL — LOW (ref 3.8–5.2)
RBC # FLD: 13.6 % — SIGNIFICANT CHANGE UP (ref 10.3–14.5)
SODIUM SERPL-SCNC: 137 MMOL/L — SIGNIFICANT CHANGE UP (ref 135–145)
WBC # BLD: 5.65 K/UL — SIGNIFICANT CHANGE UP (ref 3.8–10.5)
WBC # FLD AUTO: 5.65 K/UL — SIGNIFICANT CHANGE UP (ref 3.8–10.5)

## 2025-08-04 PROCEDURE — 99232 SBSQ HOSP IP/OBS MODERATE 35: CPT

## 2025-08-04 PROCEDURE — 99233 SBSQ HOSP IP/OBS HIGH 50: CPT | Mod: GC

## 2025-08-04 RX ADMIN — RAMELTEON 8 MILLIGRAM(S): 8 TABLET, FILM COATED ORAL at 20:32

## 2025-08-04 RX ADMIN — ENOXAPARIN SODIUM 30 MILLIGRAM(S): 100 INJECTION SUBCUTANEOUS at 18:26

## 2025-08-04 RX ADMIN — POLYETHYLENE GLYCOL 3350 17 GRAM(S): 17 POWDER, FOR SOLUTION ORAL at 11:20

## 2025-08-04 RX ADMIN — Medication 40 MILLIGRAM(S): at 05:52

## 2025-08-04 RX ADMIN — LOSARTAN POTASSIUM 100 MILLIGRAM(S): 100 TABLET, FILM COATED ORAL at 08:49

## 2025-08-04 RX ADMIN — Medication 2 TABLET(S): at 20:32

## 2025-08-04 RX ADMIN — CARVEDILOL 25 MILLIGRAM(S): 3.12 TABLET, FILM COATED ORAL at 08:49

## 2025-08-04 RX ADMIN — TAMSULOSIN HYDROCHLORIDE 0.4 MILLIGRAM(S): 0.4 CAPSULE ORAL at 20:32

## 2025-08-04 RX ADMIN — Medication 1 APPLICATION(S): at 05:52

## 2025-08-04 RX ADMIN — ATORVASTATIN CALCIUM 10 MILLIGRAM(S): 80 TABLET, FILM COATED ORAL at 20:32

## 2025-08-04 RX ADMIN — Medication 2000 UNIT(S): at 11:20

## 2025-08-04 RX ADMIN — Medication 88 MICROGRAM(S): at 05:52

## 2025-08-04 RX ADMIN — CARVEDILOL 25 MILLIGRAM(S): 3.12 TABLET, FILM COATED ORAL at 20:32

## 2025-08-05 ENCOUNTER — TRANSCRIPTION ENCOUNTER (OUTPATIENT)
Age: 85
End: 2025-08-05

## 2025-08-05 VITALS
RESPIRATION RATE: 16 BRPM | HEART RATE: 78 BPM | OXYGEN SATURATION: 96 % | TEMPERATURE: 98 F | DIASTOLIC BLOOD PRESSURE: 70 MMHG | SYSTOLIC BLOOD PRESSURE: 119 MMHG

## 2025-08-05 LAB
APPEARANCE UR: CLEAR — SIGNIFICANT CHANGE UP
BILIRUB UR-MCNC: NEGATIVE — SIGNIFICANT CHANGE UP
COLOR SPEC: YELLOW — SIGNIFICANT CHANGE UP
DIFF PNL FLD: ABNORMAL
GLUCOSE UR QL: NEGATIVE MG/DL — SIGNIFICANT CHANGE UP
KETONES UR QL: NEGATIVE MG/DL — SIGNIFICANT CHANGE UP
LEUKOCYTE ESTERASE UR-ACNC: ABNORMAL
NITRITE UR-MCNC: NEGATIVE — SIGNIFICANT CHANGE UP
PH UR: 5.5 — SIGNIFICANT CHANGE UP (ref 5–8)
PROT UR-MCNC: NEGATIVE MG/DL — SIGNIFICANT CHANGE UP
SP GR SPEC: 1.01 — SIGNIFICANT CHANGE UP (ref 1–1.03)
UROBILINOGEN FLD QL: 0.2 MG/DL — SIGNIFICANT CHANGE UP (ref 0.2–1)

## 2025-08-05 PROCEDURE — 97530 THERAPEUTIC ACTIVITIES: CPT | Mod: GO

## 2025-08-05 PROCEDURE — 97110 THERAPEUTIC EXERCISES: CPT | Mod: GP

## 2025-08-05 PROCEDURE — 81001 URINALYSIS AUTO W/SCOPE: CPT

## 2025-08-05 PROCEDURE — 85025 COMPLETE CBC W/AUTO DIFF WBC: CPT

## 2025-08-05 PROCEDURE — 93970 EXTREMITY STUDY: CPT

## 2025-08-05 PROCEDURE — 97112 NEUROMUSCULAR REEDUCATION: CPT | Mod: GP

## 2025-08-05 PROCEDURE — 99239 HOSP IP/OBS DSCHRG MGMT >30: CPT | Mod: GC

## 2025-08-05 PROCEDURE — 92523 SPEECH SOUND LANG COMPREHEN: CPT | Mod: GN

## 2025-08-05 PROCEDURE — 97112 NEUROMUSCULAR REEDUCATION: CPT | Mod: GO

## 2025-08-05 PROCEDURE — 97535 SELF CARE MNGMENT TRAINING: CPT | Mod: GO

## 2025-08-05 PROCEDURE — 97165 OT EVAL LOW COMPLEX 30 MIN: CPT | Mod: GO

## 2025-08-05 PROCEDURE — 36415 COLL VENOUS BLD VENIPUNCTURE: CPT

## 2025-08-05 PROCEDURE — 97116 GAIT TRAINING THERAPY: CPT | Mod: GP

## 2025-08-05 PROCEDURE — 92610 EVALUATE SWALLOWING FUNCTION: CPT | Mod: GN

## 2025-08-05 PROCEDURE — 80053 COMPREHEN METABOLIC PANEL: CPT

## 2025-08-05 PROCEDURE — 85027 COMPLETE CBC AUTOMATED: CPT

## 2025-08-05 PROCEDURE — 99232 SBSQ HOSP IP/OBS MODERATE 35: CPT

## 2025-08-05 PROCEDURE — 97161 PT EVAL LOW COMPLEX 20 MIN: CPT | Mod: GP

## 2025-08-05 PROCEDURE — 87635 SARS-COV-2 COVID-19 AMP PRB: CPT

## 2025-08-05 PROCEDURE — 92507 TX SP LANG VOICE COMM INDIV: CPT | Mod: GN

## 2025-08-05 RX ORDER — CEFUROXIME SODIUM 1.5 G
250 VIAL (EA) INJECTION EVERY 12 HOURS
Refills: 0 | Status: DISCONTINUED | OUTPATIENT
Start: 2025-08-05 | End: 2025-08-05

## 2025-08-05 RX ORDER — CEFUROXIME SODIUM 1.5 G
1 VIAL (EA) INJECTION
Qty: 5 | Refills: 0
Start: 2025-08-05 | End: 2025-08-07

## 2025-08-05 RX ADMIN — Medication 1 APPLICATION(S): at 05:49

## 2025-08-05 RX ADMIN — Medication 2000 UNIT(S): at 11:00

## 2025-08-05 RX ADMIN — POLYETHYLENE GLYCOL 3350 17 GRAM(S): 17 POWDER, FOR SOLUTION ORAL at 11:00

## 2025-08-05 RX ADMIN — Medication 40 MILLIGRAM(S): at 05:49

## 2025-08-05 RX ADMIN — CARVEDILOL 25 MILLIGRAM(S): 3.12 TABLET, FILM COATED ORAL at 07:47

## 2025-08-05 RX ADMIN — Medication 88 MICROGRAM(S): at 05:49

## 2025-08-07 PROBLEM — Z78.9 OTHER SPECIFIED HEALTH STATUS: Chronic | Status: ACTIVE | Noted: 2025-07-05

## 2025-08-07 PROBLEM — E78.5 HYPERLIPIDEMIA, UNSPECIFIED: Chronic | Status: ACTIVE | Noted: 2025-07-14

## 2025-08-07 PROBLEM — E03.9 HYPOTHYROIDISM, UNSPECIFIED: Chronic | Status: ACTIVE | Noted: 2025-07-14

## 2025-08-07 PROBLEM — I10 ESSENTIAL (PRIMARY) HYPERTENSION: Chronic | Status: ACTIVE | Noted: 2025-07-14

## 2025-08-12 ENCOUNTER — APPOINTMENT (OUTPATIENT)
Dept: INTERNAL MEDICINE | Facility: CLINIC | Age: 85
End: 2025-08-12
Payer: MEDICARE

## 2025-08-12 VITALS
HEART RATE: 91 BPM | DIASTOLIC BLOOD PRESSURE: 72 MMHG | WEIGHT: 148 LBS | SYSTOLIC BLOOD PRESSURE: 136 MMHG | BODY MASS INDEX: 25.27 KG/M2 | HEIGHT: 64 IN | TEMPERATURE: 98.8 F | OXYGEN SATURATION: 97 %

## 2025-08-12 VITALS — SYSTOLIC BLOOD PRESSURE: 115 MMHG | DIASTOLIC BLOOD PRESSURE: 75 MMHG

## 2025-08-12 DIAGNOSIS — I61.9 NONTRAUMATIC INTRACEREBRAL HEMORRHAGE, UNSPECIFIED: ICD-10-CM

## 2025-08-12 DIAGNOSIS — E78.5 HYPERLIPIDEMIA, UNSPECIFIED: ICD-10-CM

## 2025-08-12 DIAGNOSIS — I07.1 RHEUMATIC TRICUSPID INSUFFICIENCY: ICD-10-CM

## 2025-08-12 DIAGNOSIS — I77.810 THORACIC AORTIC ECTASIA: ICD-10-CM

## 2025-08-12 DIAGNOSIS — R33.9 RETENTION OF URINE, UNSPECIFIED: ICD-10-CM

## 2025-08-12 DIAGNOSIS — F41.9 ANXIETY DISORDER, UNSPECIFIED: ICD-10-CM

## 2025-08-12 DIAGNOSIS — R82.90 UNSPECIFIED ABNORMAL FINDINGS IN URINE: ICD-10-CM

## 2025-08-12 DIAGNOSIS — R94.31 ABNORMAL ELECTROCARDIOGRAM [ECG] [EKG]: ICD-10-CM

## 2025-08-12 DIAGNOSIS — E03.9 HYPOTHYROIDISM, UNSPECIFIED: ICD-10-CM

## 2025-08-12 DIAGNOSIS — R06.02 SHORTNESS OF BREATH: ICD-10-CM

## 2025-08-12 PROBLEM — I61.5 INTRAVENTRICULAR HEMORRHAGE: Status: ACTIVE | Noted: 2025-08-12

## 2025-08-12 PROBLEM — D64.9 ANEMIA, UNSPECIFIED TYPE: Status: ACTIVE | Noted: 2025-08-12

## 2025-08-12 PROBLEM — K64.4 BLEEDING EXTERNAL HEMORRHOIDS: Status: ACTIVE | Noted: 2025-08-12

## 2025-08-12 PROCEDURE — 93000 ELECTROCARDIOGRAM COMPLETE: CPT

## 2025-08-12 PROCEDURE — 99496 TRANSJ CARE MGMT HIGH F2F 7D: CPT

## 2025-08-12 RX ORDER — TAMSULOSIN HYDROCHLORIDE 0.4 MG/1
0.4 CAPSULE ORAL
Qty: 30 | Refills: 0 | Status: ACTIVE | COMMUNITY
Start: 2025-08-12

## 2025-08-12 RX ORDER — CARVEDILOL 25 MG/1
25 TABLET, FILM COATED ORAL
Qty: 180 | Refills: 1 | Status: ACTIVE | COMMUNITY
Start: 2025-08-12 | End: 1900-01-01

## 2025-08-13 LAB
ALBUMIN SERPL ELPH-MCNC: 3.7 G/DL
ALP BLD-CCNC: 77 U/L
ALT SERPL-CCNC: 18 U/L
ANION GAP SERPL CALC-SCNC: 13 MMOL/L
APPEARANCE: CLEAR
AST SERPL-CCNC: 15 U/L
BACTERIA: ABNORMAL /HPF
BASOPHILS # BLD AUTO: 0.04 K/UL
BASOPHILS NFR BLD AUTO: 0.6 %
BILIRUB SERPL-MCNC: 0.2 MG/DL
BILIRUBIN URINE: NEGATIVE
BLOOD URINE: ABNORMAL
BUN SERPL-MCNC: 21 MG/DL
CALCIUM SERPL-MCNC: 9 MG/DL
CAST: 2 /LPF
CHLORIDE SERPL-SCNC: 108 MMOL/L
CHOLEST SERPL-MCNC: 129 MG/DL
CK SERPL-CCNC: 31 U/L
CO2 SERPL-SCNC: 20 MMOL/L
COLOR: YELLOW
CREAT SERPL-MCNC: 0.74 MG/DL
EGFRCR SERPLBLD CKD-EPI 2021: 81 ML/MIN/1.73M2
EOSINOPHIL # BLD AUTO: 0.23 K/UL
EOSINOPHIL NFR BLD AUTO: 3.7 %
EPITHELIAL CELLS: 2 /HPF
ESTIMATED AVERAGE GLUCOSE: 111 MG/DL
FERRITIN SERPL-MCNC: 285 NG/ML
FOLATE SERPL-MCNC: 6.7 NG/ML
GLUCOSE QUALITATIVE U: NEGATIVE MG/DL
GLUCOSE SERPL-MCNC: 100 MG/DL
HBA1C MFR BLD HPLC: 5.5 %
HCT VFR BLD CALC: 31 %
HDLC SERPL-MCNC: 36 MG/DL
HGB BLD-MCNC: 9.9 G/DL
IMM GRANULOCYTES NFR BLD AUTO: 0.6 %
IRON SATN MFR SERPL: 25 %
IRON SERPL-MCNC: 55 UG/DL
KETONES URINE: NEGATIVE MG/DL
LDLC SERPL-MCNC: 67 MG/DL
LEUKOCYTE ESTERASE URINE: ABNORMAL
LYMPHOCYTES # BLD AUTO: 1.45 K/UL
LYMPHOCYTES NFR BLD AUTO: 23.4 %
MAN DIFF?: NORMAL
MCHC RBC-ENTMCNC: 29.8 PG
MCHC RBC-ENTMCNC: 31.9 G/DL
MCV RBC AUTO: 93.4 FL
MICROSCOPIC-UA: NORMAL
MONOCYTES # BLD AUTO: 0.73 K/UL
MONOCYTES NFR BLD AUTO: 11.8 %
NEUTROPHILS # BLD AUTO: 3.7 K/UL
NEUTROPHILS NFR BLD AUTO: 59.9 %
NITRITE URINE: POSITIVE
NONHDLC SERPL-MCNC: 94 MG/DL
PH URINE: 5.5
PLATELET # BLD AUTO: 242 K/UL
POTASSIUM SERPL-SCNC: 4 MMOL/L
PROT SERPL-MCNC: 6.6 G/DL
PROTEIN URINE: NORMAL MG/DL
RBC # BLD: 3.32 M/UL
RBC # FLD: 14.2 %
RED BLOOD CELLS URINE: 0 /HPF
SODIUM SERPL-SCNC: 141 MMOL/L
SPECIFIC GRAVITY URINE: 1.02
T4 FREE SERPL-MCNC: 1.5 NG/DL
TIBC SERPL-MCNC: 216 UG/DL
TRIGL SERPL-MCNC: 155 MG/DL
TSH SERPL-ACNC: 1.81 UIU/ML
UIBC SERPL-MCNC: 161 UG/DL
UROBILINOGEN URINE: 0.2 MG/DL
VIT B12 SERPL-MCNC: 300 PG/ML
WBC # FLD AUTO: 6.19 K/UL
WHITE BLOOD CELLS URINE: 12 /HPF

## 2025-08-15 ENCOUNTER — RX RENEWAL (OUTPATIENT)
Age: 85
End: 2025-08-15

## 2025-08-15 LAB — BACTERIA UR CULT: ABNORMAL

## 2025-08-19 ENCOUNTER — APPOINTMENT (OUTPATIENT)
Dept: MRI IMAGING | Facility: HOSPITAL | Age: 85
End: 2025-08-19

## 2025-08-19 ENCOUNTER — OUTPATIENT (OUTPATIENT)
Dept: OUTPATIENT SERVICES | Facility: HOSPITAL | Age: 85
LOS: 1 days | End: 2025-08-19
Payer: MEDICARE

## 2025-08-19 DIAGNOSIS — R42 DIZZINESS AND GIDDINESS: ICD-10-CM

## 2025-08-19 PROCEDURE — 70551 MRI BRAIN STEM W/O DYE: CPT | Mod: 26

## 2025-08-19 PROCEDURE — 70551 MRI BRAIN STEM W/O DYE: CPT

## 2025-08-20 LAB
BASOPHILS # BLD AUTO: 0.04 K/UL
BASOPHILS NFR BLD AUTO: 0.6 %
EOSINOPHIL # BLD AUTO: 0.23 K/UL
EOSINOPHIL NFR BLD AUTO: 3.5 %
HCT VFR BLD CALC: 32.3 %
HGB BLD-MCNC: 10.5 G/DL
IMM GRANULOCYTES NFR BLD AUTO: 0.2 %
LYMPHOCYTES # BLD AUTO: 1.3 K/UL
LYMPHOCYTES NFR BLD AUTO: 19.6 %
MAN DIFF?: NORMAL
MCHC RBC-ENTMCNC: 29.4 PG
MCHC RBC-ENTMCNC: 32.5 G/DL
MCV RBC AUTO: 90.5 FL
MONOCYTES # BLD AUTO: 0.72 K/UL
MONOCYTES NFR BLD AUTO: 10.9 %
NEUTROPHILS # BLD AUTO: 4.33 K/UL
NEUTROPHILS NFR BLD AUTO: 65.2 %
PLATELET # BLD AUTO: 180 K/UL
RBC # BLD: 3.57 M/UL
RBC # FLD: 14.5 %
WBC # FLD AUTO: 6.63 K/UL

## 2025-08-21 ENCOUNTER — TRANSCRIPTION ENCOUNTER (OUTPATIENT)
Age: 85
End: 2025-08-21

## 2025-08-21 ENCOUNTER — APPOINTMENT (OUTPATIENT)
Dept: INTERNAL MEDICINE | Facility: CLINIC | Age: 85
End: 2025-08-21

## 2025-08-21 DIAGNOSIS — K64.4 RESIDUAL HEMORRHOIDAL SKIN TAGS: ICD-10-CM

## 2025-08-21 DIAGNOSIS — I61.5 NONTRAUMATIC INTRACEREBRAL HEMORRHAGE, INTRAVENTRICULAR: ICD-10-CM

## 2025-08-21 LAB
ANION GAP SERPL CALC-SCNC: 15 MMOL/L
BUN SERPL-MCNC: 20 MG/DL
CALCIUM SERPL-MCNC: 8.8 MG/DL
CHLORIDE SERPL-SCNC: 103 MMOL/L
CO2 SERPL-SCNC: 22 MMOL/L
CREAT SERPL-MCNC: 0.79 MG/DL
EGFRCR SERPLBLD CKD-EPI 2021: 75 ML/MIN/1.73M2
GLUCOSE SERPL-MCNC: 94 MG/DL
NT-PROBNP SERPL-MCNC: 352 PG/ML
POTASSIUM SERPL-SCNC: 4.3 MMOL/L
SODIUM SERPL-SCNC: 141 MMOL/L

## 2025-08-21 PROCEDURE — G2211 COMPLEX E/M VISIT ADD ON: CPT | Mod: 2W

## 2025-08-21 PROCEDURE — 99213 OFFICE O/P EST LOW 20 MIN: CPT | Mod: 2W

## 2025-08-21 RX ORDER — AMOXICILLIN AND CLAVULANATE POTASSIUM 875; 125 MG/1; MG/1
875-125 TABLET, COATED ORAL TWICE DAILY
Qty: 14 | Refills: 0 | Status: DISCONTINUED | COMMUNITY
Start: 2025-08-15 | End: 2025-08-21

## 2025-08-21 RX ORDER — HYDROCHLOROTHIAZIDE 12.5 MG/1
12.5 CAPSULE ORAL
Qty: 30 | Refills: 0 | Status: ACTIVE | COMMUNITY
Start: 2025-08-12 | End: 1900-01-01

## 2025-08-21 RX ORDER — LOSARTAN POTASSIUM 50 MG/1
50 TABLET, FILM COATED ORAL DAILY
Qty: 90 | Refills: 1 | Status: ACTIVE | COMMUNITY
Start: 2025-08-12 | End: 1900-01-01

## 2025-08-26 ENCOUNTER — APPOINTMENT (OUTPATIENT)
Dept: NEUROLOGY | Facility: CLINIC | Age: 85
End: 2025-08-26

## 2025-08-26 ENCOUNTER — APPOINTMENT (OUTPATIENT)
Dept: UROLOGY | Facility: CLINIC | Age: 85
End: 2025-08-26

## 2025-08-26 VITALS
OXYGEN SATURATION: 98 % | DIASTOLIC BLOOD PRESSURE: 70 MMHG | WEIGHT: 148 LBS | SYSTOLIC BLOOD PRESSURE: 120 MMHG | HEIGHT: 64 IN | BODY MASS INDEX: 25.27 KG/M2 | HEART RATE: 88 BPM

## 2025-08-26 DIAGNOSIS — R33.9 RETENTION OF URINE, UNSPECIFIED: ICD-10-CM

## 2025-08-26 PROCEDURE — 99459 PELVIC EXAMINATION: CPT

## 2025-08-26 PROCEDURE — 99205 OFFICE O/P NEW HI 60 MIN: CPT | Mod: 25

## 2025-08-26 PROCEDURE — 51701 INSERT BLADDER CATHETER: CPT

## 2025-08-26 RX ORDER — NITROFURANTOIN (MONOHYDRATE/MACROCRYSTALS) 25; 75 MG/1; MG/1
100 CAPSULE ORAL
Qty: 10 | Refills: 0 | Status: DISCONTINUED | COMMUNITY
Start: 2025-08-18 | End: 2025-08-26

## 2025-08-27 LAB
APPEARANCE: CLEAR
BACTERIA: ABNORMAL /HPF
BILIRUBIN URINE: NEGATIVE
BLOOD URINE: NEGATIVE
CAST: 0 /LPF
COLOR: YELLOW
EPITHELIAL CELLS: 1 /HPF
GLUCOSE QUALITATIVE U: NEGATIVE MG/DL
KETONES URINE: NEGATIVE MG/DL
LEUKOCYTE ESTERASE URINE: ABNORMAL
MICROSCOPIC-UA: NORMAL
NITRITE URINE: POSITIVE
PH URINE: 5.5
PROTEIN URINE: NEGATIVE MG/DL
RED BLOOD CELLS URINE: 0 /HPF
SPECIFIC GRAVITY URINE: 1.01
UROBILINOGEN URINE: 0.2 MG/DL
WHITE BLOOD CELLS URINE: 6 /HPF

## 2025-08-29 ENCOUNTER — APPOINTMENT (OUTPATIENT)
Dept: HOME HEALTH SERVICES | Facility: HOME HEALTH | Age: 85
End: 2025-08-29

## 2025-08-29 VITALS
TEMPERATURE: 97.5 F | RESPIRATION RATE: 18 BRPM | SYSTOLIC BLOOD PRESSURE: 113 MMHG | DIASTOLIC BLOOD PRESSURE: 63 MMHG | HEART RATE: 78 BPM | WEIGHT: 148 LBS | HEIGHT: 64 IN | BODY MASS INDEX: 25.27 KG/M2 | OXYGEN SATURATION: 96 %

## 2025-08-29 DIAGNOSIS — R21 RASH AND OTHER NONSPECIFIC SKIN ERUPTION: ICD-10-CM

## 2025-08-29 DIAGNOSIS — E78.5 HYPERLIPIDEMIA, UNSPECIFIED: ICD-10-CM

## 2025-08-29 DIAGNOSIS — E03.9 HYPOTHYROIDISM, UNSPECIFIED: ICD-10-CM

## 2025-08-29 DIAGNOSIS — N39.0 URINARY TRACT INFECTION, SITE NOT SPECIFIED: ICD-10-CM

## 2025-08-29 DIAGNOSIS — Z74.09 OTHER REDUCED MOBILITY: ICD-10-CM

## 2025-08-29 DIAGNOSIS — I61.9 NONTRAUMATIC INTRACEREBRAL HEMORRHAGE, UNSPECIFIED: ICD-10-CM

## 2025-08-29 DIAGNOSIS — R39.81 FUNCTIONAL URINARY INCONTINENCE: ICD-10-CM

## 2025-08-29 PROCEDURE — 99344 HOME/RES VST NEW MOD MDM 60: CPT | Mod: 2W

## 2025-08-29 RX ORDER — NITROFURANTOIN (MONOHYDRATE/MACROCRYSTALS) 25; 75 MG/1; MG/1
100 CAPSULE ORAL
Qty: 10 | Refills: 0 | Status: ACTIVE | COMMUNITY
Start: 2025-08-29 | End: 1900-01-01

## 2025-08-29 RX ORDER — LORATADINE 5 MG
TABLET,CHEWABLE ORAL
Refills: 0 | Status: ACTIVE | COMMUNITY

## 2025-08-29 RX ORDER — ATORVASTATIN CALCIUM 10 MG/1
10 TABLET, FILM COATED ORAL
Qty: 90 | Refills: 0 | Status: ACTIVE | COMMUNITY

## 2025-08-29 RX ORDER — PANTOPRAZOLE 40 MG/1
40 TABLET, DELAYED RELEASE ORAL
Refills: 0 | Status: ACTIVE | COMMUNITY
Start: 2025-08-29

## 2025-08-30 LAB — BACTERIA UR CULT: ABNORMAL

## 2025-09-08 ENCOUNTER — APPOINTMENT (OUTPATIENT)
Dept: DERMATOLOGY | Facility: CLINIC | Age: 85
End: 2025-09-08
Payer: MEDICARE

## 2025-09-08 DIAGNOSIS — L30.4 ERYTHEMA INTERTRIGO: ICD-10-CM

## 2025-09-08 DIAGNOSIS — R21 RASH AND OTHER NONSPECIFIC SKIN ERUPTION: ICD-10-CM

## 2025-09-08 PROCEDURE — 99204 OFFICE O/P NEW MOD 45 MIN: CPT

## 2025-09-08 RX ORDER — KETOCONAZOLE 20 MG/G
2 CREAM TOPICAL
Qty: 1 | Refills: 2 | Status: ACTIVE | COMMUNITY
Start: 2025-09-08 | End: 1900-01-01

## 2025-09-08 RX ORDER — TRIAMCINOLONE ACETONIDE 1 MG/G
0.1 CREAM TOPICAL
Qty: 1 | Refills: 1 | Status: ACTIVE | COMMUNITY
Start: 2025-09-08 | End: 1900-01-01

## 2025-09-12 ENCOUNTER — APPOINTMENT (OUTPATIENT)
Dept: CARDIOLOGY | Facility: CLINIC | Age: 85
End: 2025-09-12
Payer: MEDICARE

## 2025-09-12 ENCOUNTER — NON-APPOINTMENT (OUTPATIENT)
Age: 85
End: 2025-09-12

## 2025-09-12 ENCOUNTER — LABORATORY RESULT (OUTPATIENT)
Age: 85
End: 2025-09-12

## 2025-09-12 VITALS
SYSTOLIC BLOOD PRESSURE: 140 MMHG | DIASTOLIC BLOOD PRESSURE: 60 MMHG | RESPIRATION RATE: 16 BRPM | OXYGEN SATURATION: 98 % | TEMPERATURE: 98 F | HEART RATE: 76 BPM | WEIGHT: 151 LBS | BODY MASS INDEX: 25.92 KG/M2

## 2025-09-12 DIAGNOSIS — Z13.820 ENCOUNTER FOR SCREENING FOR OSTEOPOROSIS: ICD-10-CM

## 2025-09-12 DIAGNOSIS — I61.5 NONTRAUMATIC INTRACEREBRAL HEMORRHAGE, INTRAVENTRICULAR: ICD-10-CM

## 2025-09-12 DIAGNOSIS — K21.9 GASTRO-ESOPHAGEAL REFLUX DISEASE W/OUT ESOPHAGITIS: ICD-10-CM

## 2025-09-12 DIAGNOSIS — Z00.00 ENCOUNTER FOR GENERAL ADULT MEDICAL EXAMINATION W/OUT ABNORMAL FINDINGS: ICD-10-CM

## 2025-09-12 DIAGNOSIS — K64.4 RESIDUAL HEMORRHOIDAL SKIN TAGS: ICD-10-CM

## 2025-09-12 DIAGNOSIS — D64.9 ANEMIA, UNSPECIFIED: ICD-10-CM

## 2025-09-12 DIAGNOSIS — Z13.228 ENCOUNTER FOR SCREENING FOR OTHER METABOLIC DISORDERS: ICD-10-CM

## 2025-09-12 DIAGNOSIS — M79.89 OTHER SPECIFIED SOFT TISSUE DISORDERS: ICD-10-CM

## 2025-09-12 DIAGNOSIS — N39.0 URINARY TRACT INFECTION, SITE NOT SPECIFIED: ICD-10-CM

## 2025-09-12 DIAGNOSIS — E78.5 HYPERLIPIDEMIA, UNSPECIFIED: ICD-10-CM

## 2025-09-12 DIAGNOSIS — E03.9 HYPOTHYROIDISM, UNSPECIFIED: ICD-10-CM

## 2025-09-12 DIAGNOSIS — Z12.11 ENCOUNTER FOR SCREENING FOR MALIGNANT NEOPLASM OF COLON: ICD-10-CM

## 2025-09-12 DIAGNOSIS — I07.1 RHEUMATIC TRICUSPID INSUFFICIENCY: ICD-10-CM

## 2025-09-12 DIAGNOSIS — Z12.39 ENCOUNTER FOR OTHER SCREENING FOR MALIGNANT NEOPLASM OF BREAST: ICD-10-CM

## 2025-09-12 DIAGNOSIS — I10 ESSENTIAL (PRIMARY) HYPERTENSION: ICD-10-CM

## 2025-09-12 DIAGNOSIS — R94.31 ABNORMAL ELECTROCARDIOGRAM [ECG] [EKG]: ICD-10-CM

## 2025-09-12 PROCEDURE — 99397 PER PM REEVAL EST PAT 65+ YR: CPT

## 2025-09-12 PROCEDURE — 93000 ELECTROCARDIOGRAM COMPLETE: CPT

## 2025-09-12 PROCEDURE — 93970 EXTREMITY STUDY: CPT

## 2025-09-15 LAB
ALBUMIN SERPL ELPH-MCNC: 4 G/DL
ALP BLD-CCNC: 73 U/L
ALT SERPL-CCNC: 15 U/L
ANION GAP SERPL CALC-SCNC: 14 MMOL/L
AST SERPL-CCNC: 18 U/L
BASOPHILS # BLD AUTO: 0.04 K/UL
BASOPHILS NFR BLD AUTO: 0.6 %
BILIRUB SERPL-MCNC: 0.3 MG/DL
BUN SERPL-MCNC: 20 MG/DL
CALCIUM SERPL-MCNC: 8.8 MG/DL
CHLORIDE SERPL-SCNC: 105 MMOL/L
CHOLEST SERPL-MCNC: 141 MG/DL
CK SERPL-CCNC: 36 U/L
CO2 SERPL-SCNC: 22 MMOL/L
CREAT SERPL-MCNC: 0.78 MG/DL
EGFRCR SERPLBLD CKD-EPI 2021: 76 ML/MIN/1.73M2
EOSINOPHIL # BLD AUTO: 0.26 K/UL
EOSINOPHIL NFR BLD AUTO: 4.1 %
FERRITIN SERPL-MCNC: 161 NG/ML
FOLATE SERPL-MCNC: 8.7 NG/ML
GLUCOSE SERPL-MCNC: 83 MG/DL
HAPTOGLOB SERPL-MCNC: 129 MG/DL
HCT VFR BLD CALC: 34.5 %
HDLC SERPL-MCNC: 42 MG/DL
HGB BLD-MCNC: 10.9 G/DL
IMM GRANULOCYTES NFR BLD AUTO: 0.3 %
IRON SATN MFR SERPL: 23 %
IRON SERPL-MCNC: 58 UG/DL
LDH SERPL-CCNC: 197 U/L
LDLC SERPL-MCNC: 70 MG/DL
LYMPHOCYTES # BLD AUTO: 1.39 K/UL
LYMPHOCYTES NFR BLD AUTO: 21.9 %
MAGNESIUM SERPL-MCNC: 2.1 MG/DL
MAN DIFF?: NORMAL
MCHC RBC-ENTMCNC: 29.5 PG
MCHC RBC-ENTMCNC: 31.6 G/DL
MCV RBC AUTO: 93.5 FL
MONOCYTES # BLD AUTO: 0.72 K/UL
MONOCYTES NFR BLD AUTO: 11.4 %
NEUTROPHILS # BLD AUTO: 3.91 K/UL
NEUTROPHILS NFR BLD AUTO: 61.7 %
NONHDLC SERPL-MCNC: 99 MG/DL
NT-PROBNP SERPL-MCNC: 625 PG/ML
PLATELET # BLD AUTO: 167 K/UL
POTASSIUM SERPL-SCNC: 4 MMOL/L
PROT SERPL-MCNC: 6.7 G/DL
RBC # BLD: 3.69 M/UL
RBC # FLD: 15.9 %
SODIUM SERPL-SCNC: 141 MMOL/L
T4 FREE SERPL-MCNC: 1.4 NG/DL
TIBC SERPL-MCNC: 250 UG/DL
TRANSFERRIN SERPL-MCNC: 192 MG/DL
TRIGL SERPL-MCNC: 165 MG/DL
TSH SERPL-ACNC: 2.86 UIU/ML
UIBC SERPL-MCNC: 192 UG/DL
VIT B12 SERPL-MCNC: 662 PG/ML
WBC # FLD AUTO: 6.34 K/UL

## 2025-09-18 ENCOUNTER — APPOINTMENT (OUTPATIENT)
Dept: PHYSICAL MEDICINE AND REHAB | Facility: CLINIC | Age: 85
End: 2025-09-18

## 2025-09-23 PROBLEM — M75.00 ADHESIVE CAPSULITIS: Status: ACTIVE | Noted: 2025-09-23

## 2025-09-23 PROBLEM — M25.519 SHOULDER PAIN: Status: ACTIVE | Noted: 2025-09-23
